# Patient Record
Sex: FEMALE | Race: WHITE | NOT HISPANIC OR LATINO | Employment: UNEMPLOYED | URBAN - METROPOLITAN AREA
[De-identification: names, ages, dates, MRNs, and addresses within clinical notes are randomized per-mention and may not be internally consistent; named-entity substitution may affect disease eponyms.]

---

## 2020-08-20 NOTE — NUR
TASK RN: DC EDUCATION PROVIDED, PT DEMONSTRATES UNDERSTANDING. PT AMBULATED 
STEADILY TO DC WITH RN AND SO

## 2020-08-20 NOTE — NUR
PATIENT C/O 8/10 RIGHT FACIAL PAIN, MEDICATED PER eMAR, VITAL SIGNS WITHIN 
NORMAL LIMITS, CALL LIGHT WITHIN REACH, SIDE RAILS UP X2, NO FURTHER NEEDS AT 
THIS TIME.

## 2020-08-20 NOTE — NUR
PATIENT WALKED BACK FROM TRIAGE WITH CHIEF COMPLAINT OF RIGHT EAR PAIN. PATIENT 
STARTED HAVING PAIN, AND REDNESS ON THE RIGHT UPPER CHEEK AND EAR SINCE 
YESTERDAY.  PER PATIENT SHE HAS HAD MASTOIDITIS IN THE PAST.  UPPER CHEEK BELOW 
EAR ON THE RIGHT SIDE IS RED AND TENDER TO TOUCH. PATIENT ACCOMPANIED BY 
SIGNIFICANT OTHER.

## 2020-08-28 ENCOUNTER — APPOINTMENT (OUTPATIENT)
Dept: RADIOLOGY | Facility: MEDICAL CENTER | Age: 45
End: 2020-08-28
Attending: EMERGENCY MEDICINE

## 2020-08-28 ENCOUNTER — HOSPITAL ENCOUNTER (OUTPATIENT)
Facility: MEDICAL CENTER | Age: 45
End: 2020-08-29
Attending: EMERGENCY MEDICINE | Admitting: HOSPITALIST

## 2020-08-28 ENCOUNTER — NURSE TRIAGE (OUTPATIENT)
Dept: HEALTH INFORMATION MANAGEMENT | Facility: OTHER | Age: 45
End: 2020-08-28

## 2020-08-28 DIAGNOSIS — L03.211 CELLULITIS, FACE: ICD-10-CM

## 2020-08-28 DIAGNOSIS — Z78.9 FAILURE OF OUTPATIENT TREATMENT: ICD-10-CM

## 2020-08-28 PROBLEM — H60.11 CELLULITIS OF AURICLE OF RIGHT EAR: Status: ACTIVE | Noted: 2020-08-28

## 2020-08-28 PROBLEM — M06.9 RHEUMATOID ARTHRITIS (HCC): Status: ACTIVE | Noted: 2020-08-28

## 2020-08-28 PROBLEM — K50.90 CROHN DISEASE (HCC): Status: ACTIVE | Noted: 2020-08-28

## 2020-08-28 LAB
ALBUMIN SERPL BCP-MCNC: 4.3 G/DL (ref 3.2–4.9)
ALBUMIN/GLOB SERPL: 1.3 G/DL
ALP SERPL-CCNC: 129 U/L (ref 30–99)
ALT SERPL-CCNC: 14 U/L (ref 2–50)
ANION GAP SERPL CALC-SCNC: 15 MMOL/L (ref 7–16)
AST SERPL-CCNC: 18 U/L (ref 12–45)
BASOPHILS # BLD AUTO: 0.4 % (ref 0–1.8)
BASOPHILS # BLD: 0.04 K/UL (ref 0–0.12)
BILIRUB SERPL-MCNC: 0.7 MG/DL (ref 0.1–1.5)
BUN SERPL-MCNC: 9 MG/DL (ref 8–22)
CALCIUM SERPL-MCNC: 9.5 MG/DL (ref 8.5–10.5)
CHLORIDE SERPL-SCNC: 102 MMOL/L (ref 96–112)
CO2 SERPL-SCNC: 22 MMOL/L (ref 20–33)
CREAT SERPL-MCNC: 0.66 MG/DL (ref 0.5–1.4)
EOSINOPHIL # BLD AUTO: 0.25 K/UL (ref 0–0.51)
EOSINOPHIL NFR BLD: 2.7 % (ref 0–6.9)
ERYTHROCYTE [DISTWIDTH] IN BLOOD BY AUTOMATED COUNT: 44.5 FL (ref 35.9–50)
GLOBULIN SER CALC-MCNC: 3.4 G/DL (ref 1.9–3.5)
GLUCOSE SERPL-MCNC: 95 MG/DL (ref 65–99)
HCG SERPL QL: NEGATIVE
HCT VFR BLD AUTO: 40 % (ref 37–47)
HGB BLD-MCNC: 13.1 G/DL (ref 12–16)
IMM GRANULOCYTES # BLD AUTO: 0.03 K/UL (ref 0–0.11)
IMM GRANULOCYTES NFR BLD AUTO: 0.3 % (ref 0–0.9)
LYMPHOCYTES # BLD AUTO: 2.92 K/UL (ref 1–4.8)
LYMPHOCYTES NFR BLD: 31.9 % (ref 22–41)
MCH RBC QN AUTO: 27.8 PG (ref 27–33)
MCHC RBC AUTO-ENTMCNC: 32.8 G/DL (ref 33.6–35)
MCV RBC AUTO: 84.7 FL (ref 81.4–97.8)
MONOCYTES # BLD AUTO: 0.41 K/UL (ref 0–0.85)
MONOCYTES NFR BLD AUTO: 4.5 % (ref 0–13.4)
NEUTROPHILS # BLD AUTO: 5.51 K/UL (ref 2–7.15)
NEUTROPHILS NFR BLD: 60.2 % (ref 44–72)
NRBC # BLD AUTO: 0 K/UL
NRBC BLD-RTO: 0 /100 WBC
PLATELET # BLD AUTO: 296 K/UL (ref 164–446)
PMV BLD AUTO: 8.9 FL (ref 9–12.9)
POTASSIUM SERPL-SCNC: 3.7 MMOL/L (ref 3.6–5.5)
PROT SERPL-MCNC: 7.7 G/DL (ref 6–8.2)
RBC # BLD AUTO: 4.72 M/UL (ref 4.2–5.4)
SODIUM SERPL-SCNC: 139 MMOL/L (ref 135–145)
WBC # BLD AUTO: 9.2 K/UL (ref 4.8–10.8)

## 2020-08-28 PROCEDURE — 700111 HCHG RX REV CODE 636 W/ 250 OVERRIDE (IP): Performed by: HOSPITALIST

## 2020-08-28 PROCEDURE — 80053 COMPREHEN METABOLIC PANEL: CPT

## 2020-08-28 PROCEDURE — C9803 HOPD COVID-19 SPEC COLLECT: HCPCS | Performed by: HOSPITALIST

## 2020-08-28 PROCEDURE — 96376 TX/PRO/DX INJ SAME DRUG ADON: CPT

## 2020-08-28 PROCEDURE — 87040 BLOOD CULTURE FOR BACTERIA: CPT

## 2020-08-28 PROCEDURE — 85025 COMPLETE CBC W/AUTO DIFF WBC: CPT

## 2020-08-28 PROCEDURE — 96365 THER/PROPH/DIAG IV INF INIT: CPT

## 2020-08-28 PROCEDURE — 70481 CT ORBIT/EAR/FOSSA W/DYE: CPT

## 2020-08-28 PROCEDURE — 700111 HCHG RX REV CODE 636 W/ 250 OVERRIDE (IP): Performed by: EMERGENCY MEDICINE

## 2020-08-28 PROCEDURE — 84703 CHORIONIC GONADOTROPIN ASSAY: CPT

## 2020-08-28 PROCEDURE — 700117 HCHG RX CONTRAST REV CODE 255: Performed by: EMERGENCY MEDICINE

## 2020-08-28 PROCEDURE — 99285 EMERGENCY DEPT VISIT HI MDM: CPT

## 2020-08-28 PROCEDURE — 700101 HCHG RX REV CODE 250: Performed by: HOSPITALIST

## 2020-08-28 PROCEDURE — G0378 HOSPITAL OBSERVATION PER HR: HCPCS

## 2020-08-28 PROCEDURE — 96375 TX/PRO/DX INJ NEW DRUG ADDON: CPT

## 2020-08-28 PROCEDURE — 700101 HCHG RX REV CODE 250: Performed by: EMERGENCY MEDICINE

## 2020-08-28 PROCEDURE — 99218 PR INITIAL OBSERVATION CARE,LEVL I: CPT | Performed by: HOSPITALIST

## 2020-08-28 RX ORDER — ONDANSETRON 2 MG/ML
4 INJECTION INTRAMUSCULAR; INTRAVENOUS ONCE
Status: COMPLETED | OUTPATIENT
Start: 2020-08-28 | End: 2020-08-28

## 2020-08-28 RX ORDER — MORPHINE SULFATE 4 MG/ML
4 INJECTION, SOLUTION INTRAMUSCULAR; INTRAVENOUS
Status: DISCONTINUED | OUTPATIENT
Start: 2020-08-28 | End: 2020-08-29 | Stop reason: HOSPADM

## 2020-08-28 RX ORDER — PROCHLORPERAZINE EDISYLATE 5 MG/ML
5-10 INJECTION INTRAMUSCULAR; INTRAVENOUS EVERY 4 HOURS PRN
Status: DISCONTINUED | OUTPATIENT
Start: 2020-08-28 | End: 2020-08-29 | Stop reason: HOSPADM

## 2020-08-28 RX ORDER — OXYCODONE HYDROCHLORIDE 10 MG/1
10 TABLET ORAL
Status: DISCONTINUED | OUTPATIENT
Start: 2020-08-28 | End: 2020-08-29 | Stop reason: HOSPADM

## 2020-08-28 RX ORDER — CLINDAMYCIN PHOSPHATE 300 MG/50ML
300 INJECTION, SOLUTION INTRAVENOUS ONCE
Status: COMPLETED | OUTPATIENT
Start: 2020-08-28 | End: 2020-08-28

## 2020-08-28 RX ORDER — OXYCODONE HYDROCHLORIDE 5 MG/1
5 TABLET ORAL
Status: DISCONTINUED | OUTPATIENT
Start: 2020-08-28 | End: 2020-08-29 | Stop reason: HOSPADM

## 2020-08-28 RX ORDER — ONDANSETRON 4 MG/1
4 TABLET, ORALLY DISINTEGRATING ORAL EVERY 4 HOURS PRN
Status: DISCONTINUED | OUTPATIENT
Start: 2020-08-28 | End: 2020-08-29 | Stop reason: HOSPADM

## 2020-08-28 RX ORDER — QUETIAPINE FUMARATE 50 MG/1
50 TABLET, FILM COATED ORAL
Status: ON HOLD | COMMUNITY
End: 2020-09-04 | Stop reason: SDUPTHER

## 2020-08-28 RX ORDER — CLINDAMYCIN HYDROCHLORIDE 150 MG/1
300 CAPSULE ORAL ONCE
Status: DISCONTINUED | OUTPATIENT
Start: 2020-08-28 | End: 2020-08-28

## 2020-08-28 RX ORDER — POLYETHYLENE GLYCOL 3350 17 G/17G
1 POWDER, FOR SOLUTION ORAL
Status: DISCONTINUED | OUTPATIENT
Start: 2020-08-28 | End: 2020-08-29 | Stop reason: HOSPADM

## 2020-08-28 RX ORDER — PROMETHAZINE HYDROCHLORIDE 25 MG/1
12.5-25 TABLET ORAL EVERY 4 HOURS PRN
Status: DISCONTINUED | OUTPATIENT
Start: 2020-08-28 | End: 2020-08-29 | Stop reason: HOSPADM

## 2020-08-28 RX ORDER — DOXYCYCLINE HYCLATE 100 MG
100 TABLET ORAL 2 TIMES DAILY
COMMUNITY
Start: 2020-08-23 | End: 2020-09-02

## 2020-08-28 RX ORDER — MORPHINE SULFATE 4 MG/ML
4 INJECTION, SOLUTION INTRAMUSCULAR; INTRAVENOUS ONCE
Status: COMPLETED | OUTPATIENT
Start: 2020-08-28 | End: 2020-08-28

## 2020-08-28 RX ORDER — ONDANSETRON 2 MG/ML
4 INJECTION INTRAMUSCULAR; INTRAVENOUS EVERY 4 HOURS PRN
Status: DISCONTINUED | OUTPATIENT
Start: 2020-08-28 | End: 2020-08-29 | Stop reason: HOSPADM

## 2020-08-28 RX ORDER — AMOXICILLIN 250 MG
2 CAPSULE ORAL 2 TIMES DAILY
Status: DISCONTINUED | OUTPATIENT
Start: 2020-08-28 | End: 2020-08-29 | Stop reason: HOSPADM

## 2020-08-28 RX ORDER — BISACODYL 10 MG
10 SUPPOSITORY, RECTAL RECTAL
Status: DISCONTINUED | OUTPATIENT
Start: 2020-08-28 | End: 2020-08-29 | Stop reason: HOSPADM

## 2020-08-28 RX ORDER — CLINDAMYCIN PHOSPHATE 600 MG/50ML
600 INJECTION, SOLUTION INTRAVENOUS EVERY 8 HOURS
Status: DISCONTINUED | OUTPATIENT
Start: 2020-08-28 | End: 2020-08-29 | Stop reason: HOSPADM

## 2020-08-28 RX ORDER — ACETAMINOPHEN 325 MG/1
650 TABLET ORAL EVERY 6 HOURS PRN
Status: DISCONTINUED | OUTPATIENT
Start: 2020-08-28 | End: 2020-08-29 | Stop reason: HOSPADM

## 2020-08-28 RX ORDER — PROMETHAZINE HYDROCHLORIDE 12.5 MG/1
12.5-25 SUPPOSITORY RECTAL EVERY 4 HOURS PRN
Status: DISCONTINUED | OUTPATIENT
Start: 2020-08-28 | End: 2020-08-29 | Stop reason: HOSPADM

## 2020-08-28 RX ORDER — HYDROMORPHONE HYDROCHLORIDE 1 MG/ML
1 INJECTION, SOLUTION INTRAMUSCULAR; INTRAVENOUS; SUBCUTANEOUS ONCE
Status: COMPLETED | OUTPATIENT
Start: 2020-08-28 | End: 2020-08-28

## 2020-08-28 RX ORDER — DIPHENHYDRAMINE HCL 25 MG
25 TABLET ORAL EVERY 6 HOURS PRN
COMMUNITY
End: 2020-09-02

## 2020-08-28 RX ADMIN — HYDROMORPHONE HYDROCHLORIDE 1 MG: 1 INJECTION, SOLUTION INTRAMUSCULAR; INTRAVENOUS; SUBCUTANEOUS at 19:38

## 2020-08-28 RX ADMIN — ONDANSETRON 4 MG: 2 INJECTION INTRAMUSCULAR; INTRAVENOUS at 19:02

## 2020-08-28 RX ADMIN — MORPHINE SULFATE 4 MG: 4 INJECTION INTRAVENOUS at 22:50

## 2020-08-28 RX ADMIN — MORPHINE SULFATE 4 MG: 4 INJECTION INTRAVENOUS at 19:02

## 2020-08-28 RX ADMIN — IOHEXOL 80 ML: 350 INJECTION, SOLUTION INTRAVENOUS at 19:00

## 2020-08-28 RX ADMIN — CLINDAMYCIN IN 5 PERCENT DEXTROSE 600 MG: 12 INJECTION, SOLUTION INTRAVENOUS at 22:50

## 2020-08-28 RX ADMIN — CLINDAMYCIN IN 5 PERCENT DEXTROSE 300 MG: 6 INJECTION, SOLUTION INTRAVENOUS at 20:45

## 2020-08-28 ASSESSMENT — ENCOUNTER SYMPTOMS
NAUSEA: 0
FEVER: 0
BLURRED VISION: 0
DIARRHEA: 0
DOUBLE VISION: 0
VOMITING: 0
SHORTNESS OF BREATH: 0
BACK PAIN: 0
DIZZINESS: 0
ABDOMINAL PAIN: 0
CHILLS: 0
COUGH: 0
HEADACHES: 0
LOSS OF CONSCIOUSNESS: 0
PALPITATIONS: 0
SORE THROAT: 0

## 2020-08-28 ASSESSMENT — LIFESTYLE VARIABLES: DO YOU DRINK ALCOHOL: NO

## 2020-08-28 NOTE — TELEPHONE ENCOUNTER
"Pt just moved here from [place] earlier this week. Has had pain behind right ear extending to along upper jaw. Today has redness and swelling.  called to see if he could accompany her into the ER. Checked with charge and answer was yes.    Reason for Disposition  • Fever and area of face is swollen    Additional Information  • Negative: Shock suspected (e.g., cold/pale/clammy skin, too weak to stand, low BP, rapid pulse)  • Negative: Similar pain previously and it was from 'heart attack'  • Negative: Similar pain previously and it was from 'angina' and not relieved by nitroglycerin  • Negative: Sounds like a life-threatening emergency to the triager  • Negative: Chest pain  • Negative: Sinus pain and congestion  • Negative: Headache or pain in upper forehead  • Negative: Toothache is the main symptom  • Negative: Followed a face injury  • Negative: Difficulty breathing or unusual sweating (e.g., sweating without exertion)  • Negative: Can't close the mouth fully (i.e., \"mouth is locked open\", patient will have difficulty talking)  • Negative: Fever and localized red rash    Answer Assessment - Initial Assessment Questions  1. ONSET: \"When did the pain start?\" (e.g., minutes, hours, days)      2 weeks ago, redness alongside right jaw and behind ear  2. ONSET: \"Does the pain come and go, or has it been constant since it started?\" (e.g., constant, intermittent, fleeting)      constant  3. SEVERITY: \"How bad is the pain?\"   (Scale 1-10; mild, moderate or severe)    - MILD (1-3): doesn't interfere with normal activities     - MODERATE (4-7): interferes with normal activities or awakens from sleep     - SEVERE (8-10): excruciating pain, unable to do any normal activities       Moderate to severe  4. LOCATION: \"Where does it hurt?\"       Right upper jaw to behind right ear  5. RASH: \"Is there any redness, rash, or swelling of the face?\"      Redness and swelling  6. FEVER: \"Do you have a fever?\" If so, ask: \"What is " "it, how was it measured, and when did it start?\"       unknown  7. OTHER SYMPTOMS: \"Do you have any other symptoms?\" (e.g., fever, toothache, nasal discharge, nasal congestion, clicking sensation in jaw joint)      none  8. PREGNANCY: \"Is there any chance you are pregnant?\" \"When was your last menstrual period?\"      No no periods in 3 years    Protocols used: FACE PAIN-A-OH      "

## 2020-08-28 NOTE — ED TRIAGE NOTES
Amb to triage w/ c/o R jaw pain, R facial redness & swelling x 3 days.  Pt reports a hx of MRSA to R face, requiring IV antibiotics. Pt just recently moved here.

## 2020-08-29 VITALS
BODY MASS INDEX: 22.49 KG/M2 | SYSTOLIC BLOOD PRESSURE: 95 MMHG | HEART RATE: 76 BPM | DIASTOLIC BLOOD PRESSURE: 59 MMHG | RESPIRATION RATE: 16 BRPM | OXYGEN SATURATION: 96 % | TEMPERATURE: 97.2 F | HEIGHT: 68 IN | WEIGHT: 148.37 LBS

## 2020-08-29 LAB
COVID ORDER STATUS COVID19: NORMAL
SARS-COV-2 RNA RESP QL NAA+PROBE: NOTDETECTED
SPECIMEN SOURCE: NORMAL

## 2020-08-29 PROCEDURE — G0378 HOSPITAL OBSERVATION PER HR: HCPCS

## 2020-08-29 PROCEDURE — U0003 INFECTIOUS AGENT DETECTION BY NUCLEIC ACID (DNA OR RNA); SEVERE ACUTE RESPIRATORY SYNDROME CORONAVIRUS 2 (SARS-COV-2) (CORONAVIRUS DISEASE [COVID-19]), AMPLIFIED PROBE TECHNIQUE, MAKING USE OF HIGH THROUGHPUT TECHNOLOGIES AS DESCRIBED BY CMS-2020-01-R: HCPCS

## 2020-08-29 NOTE — ED PROVIDER NOTES
"ED Provider Note    Scribed for Sarah Blake M.D. by Taylor Denson. 8/28/2020, 5:15 PM.    Primary care provider: No primary care provider on file.  Means of arrival: Walk-in  History obtained from: Patient  History limited by: None    CHIEF COMPLAINT  Chief Complaint   Patient presents with   • Jaw Pain   • Facial Swelling       HPI  Charley Dunn is a 45 y.o. female who presents to the Emergency Department for moderate right ear and jaw pain onset 10 days prior to arrival. The patient states that she her recurrent ear problems first began 12 years ago when she was diagnosed with an ear infection that never seemed to heal. 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 The patient reports that her swelling and pain began at first a week ago and she went was seen at [facility], she was prescribed doxycycline after her visit.  Despite completing her doxycycline yesterday she reports that her symptoms of erythema and ear pain have not resolved.  She reports that the erythema around her right ear has actually worsened.  The patient just recently moved to the area 2 weeks ago from [place] and does not yet have a PCP.  The patient denies any fever, cough, or known COVID exposure. The patient adds that she is allergic to all \"-cillins\" and will stop breathing if given any, she is also allergic to tordol, tramadol, and bactrim. She does not currently take any daily medications. The patient adds that she does have a history of Rheumatoid Arthritis.     REVIEW OF SYSTEMS  Review of Systems   Constitutional: Negative for fever.   HENT: Positive for ear pain (Right ear).         Positive for: right jaw pain   Respiratory: Negative for cough.    Gastrointestinal: Negative for abdominal pain, nausea and vomiting.   All other systems reviewed and are negative.        PAST MEDICAL HISTORY   has a past medical history of Bradycardia, Crohn's disease (HCC), MRSA (methicillin " "resistant Staphylococcus aureus), and RA (rheumatoid arthritis) (HCC).    SURGICAL HISTORY   has a past surgical history that includes appendectomy and cholecystectomy.    SOCIAL HISTORY  Social History     Tobacco Use   • Smoking status: Current Every Day Smoker     Packs/day: 0.50     Years: 32.00     Pack years: 16.00     Types: Cigarettes   Substance Use Topics   • Alcohol use: Yes     Comment: rare   • Drug use: Never      Social History     Substance and Sexual Activity   Drug Use Never       FAMILY HISTORY  History reviewed. No pertinent family history.    CURRENT MEDICATIONS  Home Medications     Reviewed by Buddy Torres R.N. (Registered Nurse) on 08/28/20 at 1659  Med List Status: Partial   Medication Last Dose Status   doxycycline (VIBRAMYCIN) 100 MG Tab 8/25 Active                ALLERGIES  Allergies   Allergen Reactions   • Bactrim [Sulfamethoxazole W-Trimethoprim]    • Pcn [Penicillins]    • Stadol [Butorphanol]    • Toradol    • Tramadol        PHYSICAL EXAM  VITAL SIGNS: /84   Pulse (!) 101   Temp 36.2 °C (97.2 °F) (Temporal)   Resp 16   Ht 1.727 m (5' 8\")   Wt 67.3 kg (148 lb 5.9 oz)   SpO2 97%   BMI 22.56 kg/m²   Vitals reviewed by myself.  Physical Exam  Nursing note and vitals reviewed.  Constitutional: Well-developed and well-nourished. Appears uncomfortable  HENT: Right ear notable for diffuse erythema anteriorly and posteriorly and to the right ear itself.  Difficult to assess ear canal and tympanic membrane given patient significant pain upon mild palpation of the right ear or surrounding tissue.  Left tympanic membrane is nonerythematous  Eyes: extra-ocular movements intact  Cardiovascular: Tachycardic rate and regular rhythm. No murmur heard.  Pulmonary/Chest: Breath sounds normal. No wheezes or rales.   Abdominal: Soft and non-tender. No distention.    Musculoskeletal: Extremities exhibit normal range of motion without edema or tenderness.   Neurological: Awake and " "alert  Skin: Skin is warm and dry. No rash.         DIAGNOSTIC STUDIES /  LABS  Labs Reviewed   CBC WITH DIFFERENTIAL - Abnormal; Notable for the following components:       Result Value    MCHC 32.8 (*)     MPV 8.9 (*)     All other components within normal limits   COMP METABOLIC PANEL - Abnormal; Notable for the following components:    Alkaline Phosphatase 129 (*)     All other components within normal limits   BLOOD CULTURE    Narrative:     Per Hospital Policy: Only change Specimen Src: to \"Line\" if  specified by physician order.   BLOOD CULTURE    Narrative:     Per Hospital Policy: Only change Specimen Src: to \"Line\" if  specified by physician order.   HCG QUAL SERUM   ESTIMATED GFR       All labs reviewed by me.        RADIOLOGY  CT-POST-FOSSA-EAR WITH   Final Result      1.  Soft tissue thickening or more likely cerumen debris in the right and left external auditory canals which appears fairly symmetric.   2.  Unremarkable right mastoid and middle ear cavity with no evidence of active or chronic inflammatory middle ear mastoid disease.   3.  Scattered moderate-sized left mastoid air cells with intervening sclerotic mastoid bone consistent with chronic/active inflammatory mastoid disease. The left middle ear cavity and mastoid antrum are clear.        The radiologist's interpretation of all radiological studies have been reviewed by me.      REASSESSMENT  5:15 PM - Patient seen and examined at bedside. Discussed plan of care, including labs and radiology. Patient agrees to the plan of care.       COURSE & MEDICAL DECISION MAKING  Nursing notes, VS, PMSFHx reviewed in chart.    Patient is a 45-year-old female who comes in for evaluation of right ear pain.  Differential diagnosis includes cellulitis, mastoiditis, otitis media.  Diagnostic work-up includes labs and CT of the posterior fossa to assess for mastoiditis.    Patient's initial vitals notable for tachycardia, she appears uncomfortable on exam.  " Patient is treated with morphine and Zofran with minimal improvement in her pain and is further treated with Dilaudid.  She has some improvement in her symptoms after Dilaudid.  Labs returned and are unremarkable, white count is within normal limits.  CT returns and demonstrates no evidence of mastoiditis on the right side, she does have sclerotic changes of the left ear, as she is not having acute pain there I feel this is a chronic process from her prior infections.  Given her ongoing pain and worsening erythema surrounding the ear despite doxycycline treatment I will hospitalize her for IV antibiotics.  I have started her on clindamycin.  Patient is amenable to this plan.  Discussed the case with Dr. Lesley mckinney has accepted patient for hospitalization.  Patient is in guarded condition.      FINAL IMPRESSION  1. Cellulitis, face    2. Failure of outpatient treatment          Taylor JENNINGS (Deborah), am scribing for, and in the presence of, Sarah Blake M.D..    Electronically signed by: Taylor Denson (Deborah), 8/28/2020    ISarah M.D. personally performed the services described in this documentation, as scribed by Taylor Denson in my presence, and it is both accurate and complete. C.     The note accurately reflects work and decisions made by me.  Sarah Blake M.D.  8/28/2020  7:43 PM

## 2020-08-29 NOTE — H&P
"Hospital Medicine History & Physical Note    Date of Service  8/28/2020    Primary Care Physician  No primary care provider on file.    Consultants      Code Status  Full Code    Chief Complaint  Chief Complaint   Patient presents with   • Jaw Pain   • Facial Swelling       History of Presenting Illness  45 y.o. female who presented 8/28/2020 with right ear pain and redness.  Patient is recently moved [place] the [facility] [place]  She reports that she woke with redness and pain about the right ear and mastoid area about 1 week ago.  She was seen at [facility] where she was started on doxycycline.  She is taking this for 7 days however has continued to get worse during this time..  It feels hot to her, and the pain extends down into her jaw into the submandibular region as well.  She is not having difficulty eating or drinking, she does not report any shortness of breath.  There is been no fever or chills.  She does have a previous history of infections on the right side, as a result of this she is deaf and is here.  She had a CT done in the ED, this was negative for evidence of mastoiditis.  Clinically she has a cellulitis and she is being admitted for treatment of this.  Patient carries a previous history of Crohn's disease and rheumatoid arthritis.  She states her Crohn's disease is \"in remission\", and she is not on any active medications for her rheumatoid arthritis.    Review of Systems  Review of Systems   Constitutional: Negative for chills and fever.   HENT: Positive for ear pain and hearing loss. Negative for nosebleeds and sore throat.    Eyes: Negative for blurred vision and double vision.   Respiratory: Negative for cough and shortness of breath.    Cardiovascular: Negative for chest pain, palpitations and leg swelling.   Gastrointestinal: Negative for abdominal pain, diarrhea, nausea and vomiting.   Genitourinary: Negative for dysuria and urgency.   Musculoskeletal: Negative for back " pain.   Skin: Positive for rash.   Neurological: Negative for dizziness, loss of consciousness and headaches.       Past Medical History   has a past medical history of Bradycardia, Crohn's disease ([facility]), MRSA (methicillin resistant Staphylococcus aureus), and RA (rheumatoid arthritis) ([facility]).    Surgical History   has a past surgical history that includes appendectomy and cholecystectomy.     Family History  family history is not on file.     Social History   reports that she has been smoking cigarettes. She has a 16.00 pack-year smoking history. She does not have any smokeless tobacco history on file. She reports current alcohol use. She reports that she does not use drugs.    Allergies  Allergies   Allergen Reactions   • Bactrim [Sulfamethoxazole W-Trimethoprim]    • Pcn [Penicillins]    • Stadol [Butorphanol]    • Toradol    • Tramadol        Medications  Prior to Admission Medications   Prescriptions Last Dose Informant Patient Reported? Taking?   doxycycline (VIBRAMYCIN) 100 MG Tab 8/25 at completed  Yes Yes   Sig: Take 100 mg by mouth 2 times a day.      Facility-Administered Medications: None       Physical Exam  Temp:  [36.2 °C (97.2 °F)] 36.2 °C (97.2 °F)  Pulse:  [101] 101  Resp:  [16] 16  BP: (124)/(84) 124/84  SpO2:  [97 %] 97 %    Physical Exam  Vitals signs reviewed.   Constitutional:       General: She is not in acute distress.     Appearance: She is well-developed. She is not diaphoretic.   HENT:      Head: Normocephalic and atraumatic.      Comments: There is erythema surrounding the right ear, extending down into the angle of the jaw and forward over the TMJ.  There is tenderness behind the ear, as well as in the pinna.  Ear canal is edematous however clear.  The tympanic membrane is cloudy however not bulging.     Left Ear: Tympanic membrane, ear canal and external ear normal.   Neck:      Vascular: No JVD.   Cardiovascular:      Rate and Rhythm: Normal rate and regular rhythm.   Pulmonary:       Effort: Pulmonary effort is normal. No respiratory distress.      Breath sounds: No stridor. No wheezing or rales.   Abdominal:      Palpations: Abdomen is soft.      Tenderness: There is no abdominal tenderness. There is no guarding or rebound.   Skin:     General: Skin is warm and dry.      Findings: No rash.   Neurological:      Mental Status: She is oriented to person, place, and time.   Psychiatric:         Thought Content: Thought content normal.         Laboratory:  Recent Labs     08/28/20  1728   WBC 9.2   RBC 4.72   HEMOGLOBIN 13.1   HEMATOCRIT 40.0   MCV 84.7   MCH 27.8   MCHC 32.8*   RDW 44.5   PLATELETCT 296   MPV 8.9*     Recent Labs     08/28/20  1728   SODIUM 139   POTASSIUM 3.7   CHLORIDE 102   CO2 22   GLUCOSE 95   BUN 9   CREATININE 0.66   CALCIUM 9.5     Recent Labs     08/28/20  1728   ALTSGPT 14   ASTSGOT 18   ALKPHOSPHAT 129*   TBILIRUBIN 0.7   GLUCOSE 95         No results for input(s): NTPROBNP in the last 72 hours.      No results for input(s): TROPONINT in the last 72 hours.    Imaging:  CT-POST-FOSSA-EAR WITH   Final Result      1.  Soft tissue thickening or more likely cerumen debris in the right and left external auditory canals which appears fairly symmetric.   2.  Unremarkable right mastoid and middle ear cavity with no evidence of active or chronic inflammatory middle ear mastoid disease.   3.  Scattered moderate-sized left mastoid air cells with intervening sclerotic mastoid bone consistent with chronic/active inflammatory mastoid disease. The left middle ear cavity and mastoid antrum are clear.            Assessment/Plan:  I anticipate this patient is appropriate for observation status at this time.    Crohn disease ([facility])  Assessment & Plan  Patient states that her Crohn's disease is in remission and she has not been on any medications for some time    Rheumatoid arthritis ([facility])  Assessment & Plan  The patient is not currently on any active therapy    Cellulitis of auricle of right  ear  Assessment & Plan  Patient has failed outpatient doxycycline  Has a penicillin allergy  We will admit and start on IV clindamycin  CT does not show any deep tissue involvement  PRN support

## 2020-08-29 NOTE — PROGRESS NOTES
Charley Dunn patient has chosen to leave the hospital against medical advice. The attending physician has not discharged the patient. Patient is not a risk to himself or others. I have discussed with the patient the following:  Physician has not determined patient is ready for discharge, Risks and consequences of leaving the hospital too soon and Benefit of continued hospitalization.       Attending physician Jeremiah PALACIOS, has been notified. IV removed.

## 2020-08-29 NOTE — ASSESSMENT & PLAN NOTE
Patient states that her Crohn's disease is in remission and she has not been on any medications for some time

## 2020-08-29 NOTE — ED NOTES
Pharmacy Medication Reconciliation    Med rec updated and complete per pt at bedside  Allergies have been verified and updated  Pt home pharmacy:[facility]      Pt reports that she finished a 3 day course of Vibramycin that was started on 08/23/2020

## 2020-08-29 NOTE — ASSESSMENT & PLAN NOTE
Patient has failed outpatient doxycycline  Has a penicillin allergy  We will admit and start on IV clindamycin  CT does not show any deep tissue involvement  PRN support

## 2020-08-29 NOTE — ED TRIAGE NOTES
"Ambulatory to room. Reports recurrent history of right \"ear problems\" and facial swelling. States she was scheduled for surgery but insurance was denied. This episode she reports three days of swelling and pain. Denies intraoral or airway involvement. Chart up for ERP.   "

## 2020-09-02 ENCOUNTER — HOSPITAL ENCOUNTER (OUTPATIENT)
Facility: MEDICAL CENTER | Age: 45
End: 2020-09-04
Attending: EMERGENCY MEDICINE | Admitting: INTERNAL MEDICINE
Payer: OTHER GOVERNMENT

## 2020-09-02 DIAGNOSIS — L03.211 FACIAL CELLULITIS: ICD-10-CM

## 2020-09-02 DIAGNOSIS — H60.11 CELLULITIS OF AURICLE OF RIGHT EAR: ICD-10-CM

## 2020-09-02 PROBLEM — E87.6 HYPOKALEMIA: Status: ACTIVE | Noted: 2020-09-02

## 2020-09-02 LAB
ANION GAP SERPL CALC-SCNC: 12 MMOL/L (ref 7–16)
BACTERIA BLD CULT: NORMAL
BACTERIA BLD CULT: NORMAL
BASOPHILS # BLD AUTO: 0.4 % (ref 0–1.8)
BASOPHILS # BLD: 0.03 K/UL (ref 0–0.12)
BUN SERPL-MCNC: 8 MG/DL (ref 8–22)
CALCIUM SERPL-MCNC: 9.4 MG/DL (ref 8.5–10.5)
CHLORIDE SERPL-SCNC: 105 MMOL/L (ref 96–112)
CO2 SERPL-SCNC: 24 MMOL/L (ref 20–33)
CREAT SERPL-MCNC: 0.69 MG/DL (ref 0.5–1.4)
EOSINOPHIL # BLD AUTO: 0.21 K/UL (ref 0–0.51)
EOSINOPHIL NFR BLD: 2.9 % (ref 0–6.9)
ERYTHROCYTE [DISTWIDTH] IN BLOOD BY AUTOMATED COUNT: 43.7 FL (ref 35.9–50)
GLUCOSE SERPL-MCNC: 89 MG/DL (ref 65–99)
HCT VFR BLD AUTO: 38.8 % (ref 37–47)
HGB BLD-MCNC: 13 G/DL (ref 12–16)
IMM GRANULOCYTES # BLD AUTO: 0.01 K/UL (ref 0–0.11)
IMM GRANULOCYTES NFR BLD AUTO: 0.1 % (ref 0–0.9)
LYMPHOCYTES # BLD AUTO: 2.55 K/UL (ref 1–4.8)
LYMPHOCYTES NFR BLD: 34.7 % (ref 22–41)
MAGNESIUM SERPL-MCNC: 2.3 MG/DL (ref 1.5–2.5)
MCH RBC QN AUTO: 28 PG (ref 27–33)
MCHC RBC AUTO-ENTMCNC: 33.5 G/DL (ref 33.6–35)
MCV RBC AUTO: 83.4 FL (ref 81.4–97.8)
MONOCYTES # BLD AUTO: 0.37 K/UL (ref 0–0.85)
MONOCYTES NFR BLD AUTO: 5 % (ref 0–13.4)
NEUTROPHILS # BLD AUTO: 4.17 K/UL (ref 2–7.15)
NEUTROPHILS NFR BLD: 56.9 % (ref 44–72)
NRBC # BLD AUTO: 0 K/UL
NRBC BLD-RTO: 0 /100 WBC
PLATELET # BLD AUTO: 288 K/UL (ref 164–446)
PMV BLD AUTO: 9 FL (ref 9–12.9)
POTASSIUM SERPL-SCNC: 3.5 MMOL/L (ref 3.6–5.5)
RBC # BLD AUTO: 4.65 M/UL (ref 4.2–5.4)
SIGNIFICANT IND 70042: NORMAL
SIGNIFICANT IND 70042: NORMAL
SITE SITE: NORMAL
SITE SITE: NORMAL
SODIUM SERPL-SCNC: 141 MMOL/L (ref 135–145)
SOURCE SOURCE: NORMAL
SOURCE SOURCE: NORMAL
WBC # BLD AUTO: 7.3 K/UL (ref 4.8–10.8)

## 2020-09-02 PROCEDURE — 700111 HCHG RX REV CODE 636 W/ 250 OVERRIDE (IP): Performed by: EMERGENCY MEDICINE

## 2020-09-02 PROCEDURE — 700101 HCHG RX REV CODE 250: Performed by: EMERGENCY MEDICINE

## 2020-09-02 PROCEDURE — 700105 HCHG RX REV CODE 258: Performed by: INTERNAL MEDICINE

## 2020-09-02 PROCEDURE — 80048 BASIC METABOLIC PNL TOTAL CA: CPT

## 2020-09-02 PROCEDURE — 700102 HCHG RX REV CODE 250 W/ 637 OVERRIDE(OP): Performed by: NURSE PRACTITIONER

## 2020-09-02 PROCEDURE — 700111 HCHG RX REV CODE 636 W/ 250 OVERRIDE (IP): Performed by: INTERNAL MEDICINE

## 2020-09-02 PROCEDURE — G0378 HOSPITAL OBSERVATION PER HR: HCPCS

## 2020-09-02 PROCEDURE — 83735 ASSAY OF MAGNESIUM: CPT

## 2020-09-02 PROCEDURE — 99285 EMERGENCY DEPT VISIT HI MDM: CPT

## 2020-09-02 PROCEDURE — 85025 COMPLETE CBC W/AUTO DIFF WBC: CPT

## 2020-09-02 PROCEDURE — 96375 TX/PRO/DX INJ NEW DRUG ADDON: CPT

## 2020-09-02 PROCEDURE — 96365 THER/PROPH/DIAG IV INF INIT: CPT

## 2020-09-02 PROCEDURE — 700111 HCHG RX REV CODE 636 W/ 250 OVERRIDE (IP): Performed by: NURSE PRACTITIONER

## 2020-09-02 PROCEDURE — A9270 NON-COVERED ITEM OR SERVICE: HCPCS | Performed by: NURSE PRACTITIONER

## 2020-09-02 PROCEDURE — 700102 HCHG RX REV CODE 250 W/ 637 OVERRIDE(OP): Performed by: INTERNAL MEDICINE

## 2020-09-02 PROCEDURE — A9270 NON-COVERED ITEM OR SERVICE: HCPCS | Performed by: INTERNAL MEDICINE

## 2020-09-02 PROCEDURE — 99220 PR INITIAL OBSERVATION CARE,LEVL III: CPT | Performed by: INTERNAL MEDICINE

## 2020-09-02 PROCEDURE — 96376 TX/PRO/DX INJ SAME DRUG ADON: CPT

## 2020-09-02 RX ORDER — NICOTINE 21 MG/24HR
21 PATCH, TRANSDERMAL 24 HOURS TRANSDERMAL
Status: DISCONTINUED | OUTPATIENT
Start: 2020-09-03 | End: 2020-09-04 | Stop reason: HOSPADM

## 2020-09-02 RX ORDER — POTASSIUM CHLORIDE 20 MEQ/1
40 TABLET, EXTENDED RELEASE ORAL ONCE
Status: COMPLETED | OUTPATIENT
Start: 2020-09-02 | End: 2020-09-02

## 2020-09-02 RX ORDER — BISACODYL 10 MG
10 SUPPOSITORY, RECTAL RECTAL
Status: DISCONTINUED | OUTPATIENT
Start: 2020-09-02 | End: 2020-09-04 | Stop reason: HOSPADM

## 2020-09-02 RX ORDER — ONDANSETRON 2 MG/ML
4 INJECTION INTRAMUSCULAR; INTRAVENOUS EVERY 6 HOURS PRN
Status: DISCONTINUED | OUTPATIENT
Start: 2020-09-02 | End: 2020-09-04 | Stop reason: HOSPADM

## 2020-09-02 RX ORDER — HYDROCODONE BITARTRATE AND ACETAMINOPHEN 5; 325 MG/1; MG/1
1-2 TABLET ORAL EVERY 6 HOURS PRN
Status: DISCONTINUED | OUTPATIENT
Start: 2020-09-02 | End: 2020-09-04 | Stop reason: HOSPADM

## 2020-09-02 RX ORDER — CLINDAMYCIN PHOSPHATE 600 MG/50ML
600 INJECTION, SOLUTION INTRAVENOUS ONCE
Status: COMPLETED | OUTPATIENT
Start: 2020-09-02 | End: 2020-09-02

## 2020-09-02 RX ORDER — SODIUM CHLORIDE 9 MG/ML
INJECTION, SOLUTION INTRAVENOUS CONTINUOUS
Status: DISCONTINUED | OUTPATIENT
Start: 2020-09-02 | End: 2020-09-03

## 2020-09-02 RX ORDER — POLYETHYLENE GLYCOL 3350 17 G/17G
1 POWDER, FOR SOLUTION ORAL
Status: DISCONTINUED | OUTPATIENT
Start: 2020-09-02 | End: 2020-09-04 | Stop reason: HOSPADM

## 2020-09-02 RX ORDER — HYDROMORPHONE HYDROCHLORIDE 1 MG/ML
0.5 INJECTION, SOLUTION INTRAMUSCULAR; INTRAVENOUS; SUBCUTANEOUS ONCE
Status: COMPLETED | OUTPATIENT
Start: 2020-09-02 | End: 2020-09-02

## 2020-09-02 RX ORDER — QUETIAPINE FUMARATE 25 MG/1
50 TABLET, FILM COATED ORAL
Status: DISCONTINUED | OUTPATIENT
Start: 2020-09-02 | End: 2020-09-04 | Stop reason: HOSPADM

## 2020-09-02 RX ORDER — ACETAMINOPHEN 325 MG/1
650 TABLET ORAL EVERY 6 HOURS PRN
Status: DISCONTINUED | OUTPATIENT
Start: 2020-09-02 | End: 2020-09-04 | Stop reason: HOSPADM

## 2020-09-02 RX ORDER — AMOXICILLIN 250 MG
2 CAPSULE ORAL 2 TIMES DAILY
Status: DISCONTINUED | OUTPATIENT
Start: 2020-09-02 | End: 2020-09-04 | Stop reason: HOSPADM

## 2020-09-02 RX ORDER — CLINDAMYCIN PHOSPHATE 600 MG/50ML
600 INJECTION, SOLUTION INTRAVENOUS EVERY 8 HOURS
Status: DISCONTINUED | OUTPATIENT
Start: 2020-09-03 | End: 2020-09-03

## 2020-09-02 RX ORDER — MORPHINE SULFATE 4 MG/ML
2 INJECTION, SOLUTION INTRAMUSCULAR; INTRAVENOUS EVERY 4 HOURS PRN
Status: DISCONTINUED | OUTPATIENT
Start: 2020-09-02 | End: 2020-09-04

## 2020-09-02 RX ADMIN — SODIUM CHLORIDE: 9 INJECTION, SOLUTION INTRAVENOUS at 22:48

## 2020-09-02 RX ADMIN — NICOTINE POLACRILEX 2 MG: 2 GUM, CHEWING BUCCAL at 23:29

## 2020-09-02 RX ADMIN — QUETIAPINE FUMARATE 50 MG: 25 TABLET ORAL at 22:46

## 2020-09-02 RX ADMIN — POTASSIUM CHLORIDE 40 MEQ: 1500 TABLET, EXTENDED RELEASE ORAL at 23:29

## 2020-09-02 RX ADMIN — ONDANSETRON 4 MG: 2 INJECTION INTRAMUSCULAR; INTRAVENOUS at 23:29

## 2020-09-02 RX ADMIN — MORPHINE SULFATE 2 MG: 4 INJECTION INTRAVENOUS at 22:56

## 2020-09-02 RX ADMIN — HYDROMORPHONE HYDROCHLORIDE 0.5 MG: 1 INJECTION, SOLUTION INTRAMUSCULAR; INTRAVENOUS; SUBCUTANEOUS at 17:30

## 2020-09-02 RX ADMIN — CLINDAMYCIN IN 5 PERCENT DEXTROSE 600 MG: 12 INJECTION, SOLUTION INTRAVENOUS at 17:31

## 2020-09-02 RX ADMIN — HYDROMORPHONE HYDROCHLORIDE 0.5 MG: 1 INJECTION, SOLUTION INTRAMUSCULAR; INTRAVENOUS; SUBCUTANEOUS at 20:13

## 2020-09-02 SDOH — ECONOMIC STABILITY: TRANSPORTATION INSECURITY
IN THE PAST 12 MONTHS, HAS LACK OF TRANSPORTATION KEPT YOU FROM MEETINGS, WORK, OR FROM GETTING THINGS NEEDED FOR DAILY LIVING?: NO

## 2020-09-02 SDOH — ECONOMIC STABILITY: FOOD INSECURITY: WITHIN THE PAST 12 MONTHS, YOU WORRIED THAT YOUR FOOD WOULD RUN OUT BEFORE YOU GOT MONEY TO BUY MORE.: PATIENT DECLINED

## 2020-09-02 SDOH — ECONOMIC STABILITY: TRANSPORTATION INSECURITY
IN THE PAST 12 MONTHS, HAS THE LACK OF TRANSPORTATION KEPT YOU FROM MEDICAL APPOINTMENTS OR FROM GETTING MEDICATIONS?: NO

## 2020-09-02 SDOH — ECONOMIC STABILITY: FOOD INSECURITY: WITHIN THE PAST 12 MONTHS, THE FOOD YOU BOUGHT JUST DIDN'T LAST AND YOU DIDN'T HAVE MONEY TO GET MORE.: PATIENT DECLINED

## 2020-09-02 ASSESSMENT — ENCOUNTER SYMPTOMS
EYE PAIN: 0
BLURRED VISION: 0
HALLUCINATIONS: 0
VOMITING: 0
PALPITATIONS: 0
FOCAL WEAKNESS: 0
NECK PAIN: 0
DOUBLE VISION: 0
SHORTNESS OF BREATH: 0
PHOTOPHOBIA: 0
ORTHOPNEA: 0
DIZZINESS: 0
CHILLS: 0
NAUSEA: 0
HEADACHES: 0
COUGH: 0
ABDOMINAL PAIN: 0
CONSTIPATION: 0
WEIGHT LOSS: 0
TREMORS: 0
TINGLING: 0
FEVER: 0
SENSORY CHANGE: 0
MYALGIAS: 1
DIARRHEA: 0
BACK PAIN: 0
SPEECH CHANGE: 0
SPUTUM PRODUCTION: 0

## 2020-09-02 ASSESSMENT — PAIN DESCRIPTION - PAIN TYPE: TYPE: ACUTE PAIN

## 2020-09-02 ASSESSMENT — COGNITIVE AND FUNCTIONAL STATUS - GENERAL
SUGGESTED CMS G CODE MODIFIER MOBILITY: CH
DAILY ACTIVITIY SCORE: 24
SUGGESTED CMS G CODE MODIFIER DAILY ACTIVITY: CH
MOBILITY SCORE: 24

## 2020-09-02 ASSESSMENT — LIFESTYLE VARIABLES
EVER FELT BAD OR GUILTY ABOUT YOUR DRINKING: NO
CONSUMPTION TOTAL: INCOMPLETE
TOTAL SCORE: 0
HAVE YOU EVER FELT YOU SHOULD CUT DOWN ON YOUR DRINKING: NO
DO YOU DRINK ALCOHOL: NO
EVER HAD A DRINK FIRST THING IN THE MORNING TO STEADY YOUR NERVES TO GET RID OF A HANGOVER: NO
HAVE PEOPLE ANNOYED YOU BY CRITICIZING YOUR DRINKING: NO
SUBSTANCE_ABUSE: 0
TOTAL SCORE: 0
TOTAL SCORE: 0
DOES PATIENT WANT TO STOP DRINKING: NO
ALCOHOL_USE: NO

## 2020-09-02 ASSESSMENT — FIBROSIS 4 INDEX
FIB4 SCORE: 0.76
FIB4 SCORE: 0.79

## 2020-09-02 ASSESSMENT — PATIENT HEALTH QUESTIONNAIRE - PHQ9
SUM OF ALL RESPONSES TO PHQ9 QUESTIONS 1 AND 2: 0
2. FEELING DOWN, DEPRESSED, IRRITABLE, OR HOPELESS: NOT AT ALL
1. LITTLE INTEREST OR PLEASURE IN DOING THINGS: NOT AT ALL

## 2020-09-02 NOTE — ED TRIAGE NOTES
Patient to ED with complaints of right ear pain, facial pain, eye lid swelling. She reports she developed this pain 2 weeks ago. She took a course of doxycycline, then presented to this ED 8/28. She was admitted to hospital, but because she was suffering severe anxiety she felt she needed to leave. She did not complete her treatment and is still feeling pain and HA. Denies fevers.     Pt educated on ED process and asked to wait in lobby. Patient educated on importance of alerting staff to new or worsening symptoms or concerns.

## 2020-09-02 NOTE — ED PROVIDER NOTES
ED Provider Note    Scribed for Ami Thrasher M.D. by Kortney Ruano. 9/2/2020  4:41 PM    Primary care provider: No primary care provider on file.  Means of arrival: Walk-In  History obtained from: Patient  History limited by: None    CHIEF COMPLAINT  Chief Complaint   Patient presents with   • Ear Pain   • Facial Pain   • Eye Pain       HPI  Charley Dunn is a 45 y.o. female with a history of arthritis who presents to the Emergency Department for right sided facial pain onset 2 weeks ago. The patient states she was at [facility] on the 28th after receiving a full course of doxycycline prescribed by [place], but reports that the treatment didn't alleviate her symptoms. She adds that she had severe anxiety at the time and felt the need to leave the hospital AMA without prescriptions.  She describes pain to the right side of her head and now notices a lump on her right upper eyelid.  No alleviating factors were noted, and pain is exacerbated with palpation. Patient has associated headache, right ear pain, right eye pain, and right eyelid edema, but denies fever or diarrhea. She does not take any daily medications.     The patient's previous records were reviewed which showed that the patient had radiology done which showed chronic mastoiditis on the left side of her face which is the opposite side of her symptoms.. The patient also had erythema to the right middle ear cavity and a cloudy tympanic membrane on the right ear.     REVIEW OF SYSTEMS  HEENT:  Positive for right ear pain and right sided facial pain.  No neck stiffness voice changes drooling or trismus no vision changes  EYES: Positive for right eye pain and right eyelid edema.   GI: no diarrhea  Endocrine: no fevers    See history of present illness. All other systems are negative. C.    PAST MEDICAL HISTORY   has a past medical history of Anxiety, Bradycardia, Crohn's disease (HCC), MRSA (methicillin resistant Staphylococcus aureus), and RA  "(rheumatoid arthritis) ([facility]).    SURGICAL HISTORY   has a past surgical history that includes other abdominal surgery and other.    SOCIAL HISTORY  Social History     Tobacco Use   • Smoking status: Current Every Day Smoker     Packs/day: 0.50     Years: 32.00     Pack years: 16.00     Types: Cigarettes   Substance Use Topics   • Alcohol use: Not Currently     Comment: rare   • Drug use: Never      Social History     Substance and Sexual Activity   Drug Use Never       FAMILY HISTORY  No family history on file.    CURRENT MEDICATIONS  Home Medications     Reviewed by Nancy Hidalgo on 09/02/20 at 1908  Med List Status: Complete   Medication Last Dose Status   quetiapine (SEROQUEL) 50 MG tablet 9/1/2020 Active                ALLERGIES  Allergies   Allergen Reactions   • Pcn [Penicillins] Anaphylaxis   • Toradol Hives     Migraine     • Tramadol Hives     migraine     • Bactrim [Sulfamethoxazole W-Trimethoprim] Itching     Face itching     • Stadol [Butorphanol] Unspecified     Pt reports it makes her feel like she can't wake up       PHYSICAL EXAM  VITAL SIGNS: /92   Pulse (!) 109   Temp 37 °C (98.6 °F) (Temporal)   Resp 20   Ht 1.727 m (5' 8\")   Wt 67.1 kg (147 lb 14.9 oz)   SpO2 98%   BMI 22.49 kg/m²     Constitutional: Well developed, Well nourished, No acute distress, Non-toxic appearance.   HEENT: No elevation or pressure pushing ear forward where mastoid is, Right TM is cloudy with no bulgding or erythema, Normocephalic, Atraumatic,  external ears normal, pharynx pink,  Mucous  Membranes moist, No rhinorrhea or mucosal edema  Eyes: Stye on right upper eyelid, PERRL, EOMI, Conjunctiva normal, No discharge.   Neck: Normal range of motion, No tenderness, Supple, No stridor.   Lymphatic: No lymphadenopathy    Cardiovascular: Regular Rate and Rhythm, No murmurs,  rubs, or gallops.   Thorax & Lungs: Lungs clear to auscultation bilaterally, No respiratory distress, No wheezes, rhales or rhonchi, No " chest wall tenderness.   Abdomen: Bowel sounds normal, Soft, non tender, non distended,  No pulsatile masses., no rebound guarding or peritoneal signs.   Skin: Right side of face swollen, tender, and mildly erythematous, Warm, Dry, No erythema, No rash,   Back:  No CVA tenderness,  No spinal tenderness, bony crepitance, step offs, or instability.   Neurologic: Normal speech, Alert & oriented x 3, Normal motor function, Normal sensory function, No focal deficits noted. Normal reflexes. Normal Cranial Nerves.  Extremities: Equal, intact distal pulses, No cyanosis, clubbing or edema,  No tenderness.   Musculoskeletal: Good range of motion in all major joints. No tenderness to palpation or major deformities noted.     DIAGNOSTIC STUDIES / PROCEDURES    LABS  Results for orders placed or performed during the hospital encounter of 09/02/20   CBC WITH DIFFERENTIAL   Result Value Ref Range    WBC 7.3 4.8 - 10.8 K/uL    RBC 4.65 4.20 - 5.40 M/uL    Hemoglobin 13.0 12.0 - 16.0 g/dL    Hematocrit 38.8 37.0 - 47.0 %    MCV 83.4 81.4 - 97.8 fL    MCH 28.0 27.0 - 33.0 pg    MCHC 33.5 (L) 33.6 - 35.0 g/dL    RDW 43.7 35.9 - 50.0 fL    Platelet Count 288 164 - 446 K/uL    MPV 9.0 9.0 - 12.9 fL    Neutrophils-Polys 56.90 44.00 - 72.00 %    Lymphocytes 34.70 22.00 - 41.00 %    Monocytes 5.00 0.00 - 13.40 %    Eosinophils 2.90 0.00 - 6.90 %    Basophils 0.40 0.00 - 1.80 %    Immature Granulocytes 0.10 0.00 - 0.90 %    Nucleated RBC 0.00 /100 WBC    Neutrophils (Absolute) 4.17 2.00 - 7.15 K/uL    Lymphs (Absolute) 2.55 1.00 - 4.80 K/uL    Monos (Absolute) 0.37 0.00 - 0.85 K/uL    Eos (Absolute) 0.21 0.00 - 0.51 K/uL    Baso (Absolute) 0.03 0.00 - 0.12 K/uL    Immature Granulocytes (abs) 0.01 0.00 - 0.11 K/uL    NRBC (Absolute) 0.00 K/uL   Basic Metabolic Panel   Result Value Ref Range    Sodium 141 135 - 145 mmol/L    Potassium 3.5 (L) 3.6 - 5.5 mmol/L    Chloride 105 96 - 112 mmol/L    Co2 24 20 - 33 mmol/L    Glucose 89 65 - 99 mg/dL     Bun 8 8 - 22 mg/dL    Creatinine 0.69 0.50 - 1.40 mg/dL    Calcium 9.4 8.5 - 10.5 mg/dL    Anion Gap 12.0 7.0 - 16.0   ESTIMATED GFR   Result Value Ref Range    GFR If African American >60 >60 mL/min/1.73 m 2    GFR If Non African American >60 >60 mL/min/1.73 m 2   Magnesium   Result Value Ref Range    Magnesium 2.3 1.5 - 2.5 mg/dL       All labs reviewed by me.    COURSE & MEDICAL DECISION MAKING  Nursing notes, VS, PMSFHx reviewed in chart.    4:41 PM Patient seen and examined at bedside. I verified that the patient was wearing a mask and I was wearing appropriate PPE every time I entered the room. The patient's mask was on the patient at all times during my encounter except for a brief view of the oropharynx. Discussed plan of care with patient. I informed them that labs will be ordered to evaluate symptoms. The patient is understanding and agreeable with plan of care. Patient will be treated with 600 mg Cleocin and 0.5 mg Dilaudid. Ordered CBC w/ Differential and BMP to evaluate her symptoms. The differential diagnoses include but are not limited to: Facial cellulitis unresolved, stye     6:05 PM Ordered Estimated GFR to evaluate the patient's symptoms    6:45 PM Patient was reevaluated at bedside. Discussed lab and radiology results with the patient and informed them that there were abnormal findings. The plan of care was discussed with the patient. She is understanding and agreeable to the plan of care. The plan for hospitalization was discussed with the patient due to her current condition. She is understanding and agreeable to the plan of care.     7:19 PM I discussed the patient's case and the above findings with Dr. Bustamante (Hospitalist) who agrees with the plan of care and agrees to hospitalize the patient due to her current condition.     8:11 PM Patient will be treated with 0.5 mg Dilaudid to alleviate her symptoms       DISPOSITION:  Patient will be hospitalized by Dr. Bustamante in [facility]  condition.      FINAL IMPRESSION  1. Facial cellulitis          Kortney JENNINGS (Scribe), am scribing for, and in the presence of, Ami Thrasher M.D..    Electronically signed by: Kortney Ruano (Scribe), 9/2/2020    Ami JENNINGS M.D. personally performed the services described in this documentation, as scribed by Kortney Ruano in my presence, and it is both accurate and complete. C.    The note accurately reflects work and decisions made by me.  Ami Thrasher M.D.  9/2/2020  10:51 PM

## 2020-09-03 LAB
ALBUMIN SERPL BCP-MCNC: 3.8 G/DL (ref 3.2–4.9)
ALBUMIN/GLOB SERPL: 1.3 G/DL
ALP SERPL-CCNC: 104 U/L (ref 30–99)
ALT SERPL-CCNC: 13 U/L (ref 2–50)
ANION GAP SERPL CALC-SCNC: 12 MMOL/L (ref 7–16)
AST SERPL-CCNC: 11 U/L (ref 12–45)
BASOPHILS # BLD AUTO: 0.5 % (ref 0–1.8)
BASOPHILS # BLD: 0.04 K/UL (ref 0–0.12)
BILIRUB SERPL-MCNC: 0.5 MG/DL (ref 0.1–1.5)
BUN SERPL-MCNC: 8 MG/DL (ref 8–22)
CALCIUM SERPL-MCNC: 9.2 MG/DL (ref 8.5–10.5)
CHLORIDE SERPL-SCNC: 106 MMOL/L (ref 96–112)
CO2 SERPL-SCNC: 22 MMOL/L (ref 20–33)
COVID ORDER STATUS COVID19: NORMAL
CREAT SERPL-MCNC: 0.63 MG/DL (ref 0.5–1.4)
EOSINOPHIL # BLD AUTO: 0.23 K/UL (ref 0–0.51)
EOSINOPHIL NFR BLD: 3 % (ref 0–6.9)
ERYTHROCYTE [DISTWIDTH] IN BLOOD BY AUTOMATED COUNT: 43.5 FL (ref 35.9–50)
GLOBULIN SER CALC-MCNC: 3 G/DL (ref 1.9–3.5)
GLUCOSE SERPL-MCNC: 96 MG/DL (ref 65–99)
HCT VFR BLD AUTO: 36.9 % (ref 37–47)
HGB BLD-MCNC: 12 G/DL (ref 12–16)
IMM GRANULOCYTES # BLD AUTO: 0.01 K/UL (ref 0–0.11)
IMM GRANULOCYTES NFR BLD AUTO: 0.1 % (ref 0–0.9)
LYMPHOCYTES # BLD AUTO: 2.24 K/UL (ref 1–4.8)
LYMPHOCYTES NFR BLD: 29.2 % (ref 22–41)
MCH RBC QN AUTO: 27.5 PG (ref 27–33)
MCHC RBC AUTO-ENTMCNC: 32.5 G/DL (ref 33.6–35)
MCV RBC AUTO: 84.4 FL (ref 81.4–97.8)
MONOCYTES # BLD AUTO: 0.41 K/UL (ref 0–0.85)
MONOCYTES NFR BLD AUTO: 5.3 % (ref 0–13.4)
NEUTROPHILS # BLD AUTO: 4.75 K/UL (ref 2–7.15)
NEUTROPHILS NFR BLD: 61.9 % (ref 44–72)
NRBC # BLD AUTO: 0 K/UL
NRBC BLD-RTO: 0 /100 WBC
PLATELET # BLD AUTO: 275 K/UL (ref 164–446)
PMV BLD AUTO: 9.4 FL (ref 9–12.9)
POTASSIUM SERPL-SCNC: 4.1 MMOL/L (ref 3.6–5.5)
PROT SERPL-MCNC: 6.8 G/DL (ref 6–8.2)
RBC # BLD AUTO: 4.37 M/UL (ref 4.2–5.4)
SARS-COV-2 RNA RESP QL NAA+PROBE: NOTDETECTED
SODIUM SERPL-SCNC: 140 MMOL/L (ref 135–145)
SPECIMEN SOURCE: NORMAL
WBC # BLD AUTO: 7.7 K/UL (ref 4.8–10.8)

## 2020-09-03 PROCEDURE — G0378 HOSPITAL OBSERVATION PER HR: HCPCS

## 2020-09-03 PROCEDURE — 700105 HCHG RX REV CODE 258: Performed by: INTERNAL MEDICINE

## 2020-09-03 PROCEDURE — 99225 PR SUBSEQUENT OBSERVATION CARE,LEVEL II: CPT | Performed by: HOSPITALIST

## 2020-09-03 PROCEDURE — 85025 COMPLETE CBC W/AUTO DIFF WBC: CPT

## 2020-09-03 PROCEDURE — 700102 HCHG RX REV CODE 250 W/ 637 OVERRIDE(OP): Performed by: HOSPITALIST

## 2020-09-03 PROCEDURE — C9803 HOPD COVID-19 SPEC COLLECT: HCPCS | Performed by: HOSPITALIST

## 2020-09-03 PROCEDURE — 700111 HCHG RX REV CODE 636 W/ 250 OVERRIDE (IP): Performed by: NURSE PRACTITIONER

## 2020-09-03 PROCEDURE — U0003 INFECTIOUS AGENT DETECTION BY NUCLEIC ACID (DNA OR RNA); SEVERE ACUTE RESPIRATORY SYNDROME CORONAVIRUS 2 (SARS-COV-2) (CORONAVIRUS DISEASE [COVID-19]), AMPLIFIED PROBE TECHNIQUE, MAKING USE OF HIGH THROUGHPUT TECHNOLOGIES AS DESCRIBED BY CMS-2020-01-R: HCPCS

## 2020-09-03 PROCEDURE — 700102 HCHG RX REV CODE 250 W/ 637 OVERRIDE(OP): Performed by: NURSE PRACTITIONER

## 2020-09-03 PROCEDURE — 80053 COMPREHEN METABOLIC PANEL: CPT

## 2020-09-03 PROCEDURE — 700111 HCHG RX REV CODE 636 W/ 250 OVERRIDE (IP): Performed by: INTERNAL MEDICINE

## 2020-09-03 PROCEDURE — A9270 NON-COVERED ITEM OR SERVICE: HCPCS | Performed by: HOSPITALIST

## 2020-09-03 PROCEDURE — A9270 NON-COVERED ITEM OR SERVICE: HCPCS | Performed by: INTERNAL MEDICINE

## 2020-09-03 PROCEDURE — 700102 HCHG RX REV CODE 250 W/ 637 OVERRIDE(OP): Performed by: INTERNAL MEDICINE

## 2020-09-03 PROCEDURE — A9270 NON-COVERED ITEM OR SERVICE: HCPCS | Performed by: NURSE PRACTITIONER

## 2020-09-03 PROCEDURE — 36415 COLL VENOUS BLD VENIPUNCTURE: CPT

## 2020-09-03 PROCEDURE — 700101 HCHG RX REV CODE 250: Performed by: INTERNAL MEDICINE

## 2020-09-03 RX ORDER — CLINDAMYCIN HYDROCHLORIDE 150 MG/1
450 CAPSULE ORAL EVERY 8 HOURS
Status: DISCONTINUED | OUTPATIENT
Start: 2020-09-03 | End: 2020-09-04 | Stop reason: HOSPADM

## 2020-09-03 RX ADMIN — CLINDAMYCIN HYDROCHLORIDE 450 MG: 150 CAPSULE ORAL at 20:14

## 2020-09-03 RX ADMIN — SODIUM CHLORIDE: 9 INJECTION, SOLUTION INTRAVENOUS at 11:59

## 2020-09-03 RX ADMIN — MORPHINE SULFATE 2 MG: 4 INJECTION INTRAVENOUS at 18:44

## 2020-09-03 RX ADMIN — MORPHINE SULFATE 2 MG: 4 INJECTION INTRAVENOUS at 09:28

## 2020-09-03 RX ADMIN — MORPHINE SULFATE 2 MG: 4 INJECTION INTRAVENOUS at 13:46

## 2020-09-03 RX ADMIN — QUETIAPINE FUMARATE 50 MG: 25 TABLET ORAL at 20:14

## 2020-09-03 RX ADMIN — NICOTINE TRANSDERMAL SYSTEM 21 MG: 21 PATCH, EXTENDED RELEASE TRANSDERMAL at 04:49

## 2020-09-03 RX ADMIN — MORPHINE SULFATE 2 MG: 4 INJECTION INTRAVENOUS at 22:57

## 2020-09-03 RX ADMIN — MORPHINE SULFATE 2 MG: 4 INJECTION INTRAVENOUS at 04:59

## 2020-09-03 RX ADMIN — CLINDAMYCIN IN 5 PERCENT DEXTROSE 600 MG: 12 INJECTION, SOLUTION INTRAVENOUS at 09:28

## 2020-09-03 RX ADMIN — CLINDAMYCIN HYDROCHLORIDE 450 MG: 150 CAPSULE ORAL at 13:46

## 2020-09-03 RX ADMIN — ONDANSETRON 4 MG: 2 INJECTION INTRAMUSCULAR; INTRAVENOUS at 20:13

## 2020-09-03 RX ADMIN — ENOXAPARIN SODIUM 40 MG: 40 INJECTION SUBCUTANEOUS at 04:49

## 2020-09-03 RX ADMIN — CLINDAMYCIN IN 5 PERCENT DEXTROSE 600 MG: 12 INJECTION, SOLUTION INTRAVENOUS at 02:59

## 2020-09-03 ASSESSMENT — PAIN DESCRIPTION - PAIN TYPE
TYPE: ACUTE PAIN

## 2020-09-03 ASSESSMENT — ENCOUNTER SYMPTOMS
VOMITING: 0
PALPITATIONS: 0
DIZZINESS: 0
HEMOPTYSIS: 0
ORTHOPNEA: 0
NAUSEA: 0
COUGH: 0
SPUTUM PRODUCTION: 0
CHILLS: 0

## 2020-09-03 ASSESSMENT — PATIENT HEALTH QUESTIONNAIRE - PHQ9
2. FEELING DOWN, DEPRESSED, IRRITABLE, OR HOPELESS: NOT AT ALL
1. LITTLE INTEREST OR PLEASURE IN DOING THINGS: NOT AT ALL
SUM OF ALL RESPONSES TO PHQ9 QUESTIONS 1 AND 2: 0

## 2020-09-03 NOTE — ED NOTES
POC discussed with pt.  Verbalized understanding.  No needs at this time   TRANSFER - OUT REPORT: 
 
Verbal report given to CAMMIE Casper(name) on Kourtney Altamirano  being transferred to [facility](unit) for routine progression of care Report consisted of patients Situation, Background, Assessment and  
Recommendations(SBAR). Information from the following report(s) SBAR, Kardex, Intake/Output, MAR and Recent Results was reviewed with the receiving nurse. Lines:  
Peripheral IV 06/02/20 Left Antecubital (Active) Site Assessment Clean, dry, & intact 6/2/2020  3:03 PM  
Phlebitis Assessment 0 6/2/2020  3:03 PM  
Infiltration Assessment 0 6/2/2020  3:03 PM  
Dressing Status Clean, dry, & intact; Occlusive 6/2/2020  3:03 PM  
Dressing Type 4 X 4;Tape;Transparent 6/2/2020  3:03 PM  
Hub Color/Line Status Pink;Flushed;Patent 6/2/2020  3:03 PM  
   
Peripheral IV 06/02/20 Right Antecubital (Active) Site Assessment Clean, dry, & intact 6/2/2020  5:34 PM  
Phlebitis Assessment 0 6/2/2020  5:34 PM  
Infiltration Assessment 0 6/2/2020  5:34 PM  
Dressing Status Clean, dry, & intact 6/2/2020  5:34 PM  
Hub Color/Line Status Pink 6/2/2020  5:34 PM  
Action Taken Blood drawn 6/2/2020  5:34 PM  
  
 
Opportunity for questions and clarification was provided.

## 2020-09-03 NOTE — ASSESSMENT & PLAN NOTE
Patient has a thickening and swelling in her right ear, it is painful  Interestingly the CT scan from 8/20/2020 does not show any significant abnormalities in the right ear, there is possible chronic disease on the left  Continue clindamycin, I will change to PO

## 2020-09-03 NOTE — PROGRESS NOTES
[facility] Medicine Daily Progress Note    Date of Service  9/3/2020    Chief Complaint  45 y.o. female admitted 9/2/2020 with right ear pain and redness.    Hospital Course    Ms Dunn has a history of history of Crohn's disease.  She presented the emergency room with cellulitis of her right ear.  Patient started on antibiotics and admitted hospital.      Interval Problem Update  Patient complains of pain in her right ear, it is little bit better than yesterday, reports relief with morphine and Norco  Tolerating diet, no nausea vomiting  Complains of fatigue    Consultants/Specialty  None    Code Status  Full Code    Disposition  If patient continues to improve with p.o. clindamycin, can likely be discharged home tomorrow    Review of Systems  Review of Systems   Constitutional: Negative for chills and malaise/fatigue.   Respiratory: Negative for cough, hemoptysis and sputum production.    Cardiovascular: Negative for chest pain, palpitations and orthopnea.   Gastrointestinal: Negative for nausea and vomiting.   Skin: Negative for itching and rash.   Neurological: Negative for dizziness.   All other systems reviewed and are negative.       Physical Exam  Temp:  [36 °C (96.8 °F)-37 °C (98.6 °F)] 36.6 °C (97.8 °F)  Pulse:  [] 70  Resp:  [16-20] 17  BP: ()/(65-93) 118/81  SpO2:  [92 %-100 %] 97 %    Physical Exam  Constitutional:       General: She is not in acute distress.     Appearance: Normal appearance. She is normal weight.   HENT:      Head: Normocephalic and atraumatic.      Right Ear: External ear normal.      Left Ear: External ear normal.      Ears:      Comments: Right external ear is erythematous, skin is indurated, tender to palpation     Nose: Nose normal.      Mouth/Throat:      Mouth: Mucous membranes are moist.      Pharynx: Oropharynx is clear.      Comments: Poor dentition  Eyes:      Extraocular Movements: Extraocular movements intact.      Conjunctiva/sclera: Conjunctivae normal.       Pupils: Pupils are equal, round, and reactive to light.   Neck:      Musculoskeletal: Normal range of motion and neck supple.   Cardiovascular:      Rate and Rhythm: Normal rate and regular rhythm.      Pulses: Normal pulses.   Pulmonary:      Effort: Pulmonary effort is normal.      Breath sounds: Normal breath sounds.   Abdominal:      General: Abdomen is flat. Bowel sounds are normal.      Palpations: Abdomen is soft.   Musculoskeletal: Normal range of motion.   Skin:     General: Skin is warm and dry.      Capillary Refill: Capillary refill takes less than 2 seconds.      Coloration: Skin is not jaundiced.   Neurological:      General: No focal deficit present.      Mental Status: She is alert and oriented to person, place, and time.      Cranial Nerves: No cranial nerve deficit.      Gait: Gait normal.   Psychiatric:         Mood and Affect: Mood normal.         Behavior: Behavior normal.         Fluids    Intake/Output Summary (Last 24 hours) at 9/3/2020 0836  Last data filed at 9/3/2020 0800  Gross per 24 hour   Intake 240 ml   Output --   Net 240 ml       Laboratory  Recent Labs     09/02/20  1805 09/03/20  0035   WBC 7.3 7.7   RBC 4.65 4.37   HEMOGLOBIN 13.0 12.0   HEMATOCRIT 38.8 36.9*   MCV 83.4 84.4   MCH 28.0 27.5   MCHC 33.5* 32.5*   RDW 43.7 43.5   PLATELETCT 288 275   MPV 9.0 9.4     Recent Labs     09/02/20  1805 09/03/20  0035   SODIUM 141 140   POTASSIUM 3.5* 4.1   CHLORIDE 105 106   CO2 24 22   GLUCOSE 89 96   BUN 8 8   CREATININE 0.69 0.63   CALCIUM 9.4 9.2                   Imaging  No orders to display        Assessment/Plan  Cellulitis of auricle of right ear- (present on admission)  Assessment & Plan  Patient has a thickening and swelling in her right ear, it is painful  Interestingly the CT scan from 8/20/2020 does not show any significant abnormalities in the right ear, there is possible chronic disease on the left  Continue clindamycin, I will change to PO    Hypokalemia  Assessment &  Plan  Replaced    Crohn disease (HCC)- (present on admission)  Assessment & Plan  She has history of Crohn's disease per  Outpatient follow-up       VTE prophylaxis: Lovenox

## 2020-09-03 NOTE — ED NOTES
Med rec updated and complete. Allergies reviewed. Met with pt at bedside. Dicussed current medications and last doses taken.      Pt finished a 3 day course of Doxycycline on  08/26/20. One dose was taken on 08/26/20.      Home pharmacy [address]

## 2020-09-03 NOTE — PROGRESS NOTES
Admitted patient. She c/o nausea and requests a nicotine patch. Called md to request meds. Will f/u

## 2020-09-03 NOTE — H&P
Hospital Medicine History & Physical Note    Date of Service  9/2/2020    Primary Care Physician  No primary care provider on file.    Consultants  None    Code Status  Prior    Chief Complaint  Chief Complaint   Patient presents with   • Ear Pain   • Facial Pain   • Eye Pain       History of Presenting Illness    45 y.o. female with past medical history of Crohn disease who presented to the hospital on 9/2/2020 with complaint of right ear pain and redness for 2 weeks.  She reported she has been having these complaints for 10 years and off-and-on she developed swelling and redness on her right ear.  She recently moved from [place] and per patient over there the physician was planning to place drain in her ear.  She presented to the hospital on August 28, 2020 and she left AGAINST MEDICAL ADVICE.  She denies any complaint of nausea, vomiting, diarrhea and constipation.  She does not have any specific radiation of her pain.  She takes edible.  She is a current smoker and she denies using any drugs and drinking alcohol in excessive amount.    ER course: I started on clindamycin as she is allergic to penicillins.  Discussed plan of care with patient and her  at the bedside.  She is allergic to tramadol and she is okay with Norco and IV morphine.  I discussed with her pain management.    I discussed plan of care with ER physician Dr. Thrasher.    Review of Systems  Review of Systems   Constitutional: Negative for chills, fever and weight loss.   HENT: Negative for hearing loss and tinnitus.    Eyes: Negative for blurred vision, double vision, photophobia and pain.   Respiratory: Negative for cough, sputum production and shortness of breath.    Cardiovascular: Negative for chest pain, palpitations, orthopnea and leg swelling.   Gastrointestinal: Negative for abdominal pain, constipation, diarrhea, nausea and vomiting.   Genitourinary: Negative for dysuria, frequency and urgency.   Musculoskeletal: Positive for  myalgias. Negative for back pain, joint pain and neck pain.   Skin: Positive for rash.   Neurological: Negative for dizziness, tingling, tremors, sensory change, speech change, focal weakness and headaches.   Psychiatric/Behavioral: Negative for hallucinations and substance abuse.   All other systems reviewed and are negative.      Past Medical History   has a past medical history of Anxiety, Bradycardia, Crohn's disease ([facility]), MRSA (methicillin resistant Staphylococcus aureus), and RA (rheumatoid arthritis) ([facility]).    Surgical History   has a past surgical history that includes appendectomy and cholecystectomy.     Family History  Not pertinent.    Social History   reports that she has been smoking cigarettes. She has a 16.00 pack-year smoking history. She does not have any smokeless tobacco history on file. She reports current alcohol use. She reports that she does not use drugs.    Allergies  Allergies   Allergen Reactions   • Pcn [Penicillins] Anaphylaxis   • Toradol Hives     Migraine     • Tramadol Hives     migraine     • Bactrim [Sulfamethoxazole W-Trimethoprim] Itching     Face itching     • Stadol [Butorphanol] Unspecified     Pt reports it makes her feel like she can't wake up       Medications  Prior to Admission Medications   Prescriptions Last Dose Informant Patient Reported? Taking?   quetiapine (SEROQUEL) 50 MG tablet 9/1/2020 at 2200 Patient Yes No   Sig: Take 50 mg by mouth every bedtime.      Facility-Administered Medications: None       Physical Exam  Temp:  [37 °C (98.6 °F)] 37 °C (98.6 °F)  Pulse:  [] 94  Resp:  [20] 20  BP: (108-140)/(67-92) 140/85  SpO2:  [92 %-100 %] 92 %    Physical Exam  Vitals signs reviewed.   Constitutional:       General: She is not in acute distress.     Appearance: Normal appearance. She is not ill-appearing.   HENT:      Head: Normocephalic and atraumatic.      Nose: No congestion.   Eyes:      General:         Right eye: No discharge.         Left eye: No  discharge.      Pupils: Pupils are equal, round, and reactive to light.      Comments: Stye present on right upper eyelid   Neck:      Musculoskeletal: Normal range of motion. No neck rigidity.   Cardiovascular:      Rate and Rhythm: Normal rate and regular rhythm.      Pulses: Normal pulses.      Heart sounds: Normal heart sounds. No murmur.   Pulmonary:      Effort: Pulmonary effort is normal. No respiratory distress.      Breath sounds: Normal breath sounds. No stridor.   Abdominal:      General: Bowel sounds are normal. There is no distension.      Palpations: Abdomen is soft.      Tenderness: There is no abdominal tenderness.   Musculoskeletal: Normal range of motion.         General: No swelling or tenderness.   Skin:     General: Skin is warm.      Capillary Refill: Capillary refill takes less than 2 seconds.      Coloration: Skin is not jaundiced or pale.      Findings: Erythema (Around the right face and ear) present. No bruising.   Neurological:      General: No focal deficit present.      Mental Status: She is alert and oriented to person, place, and time.      Cranial Nerves: No cranial nerve deficit.   Psychiatric:         Mood and Affect: Mood normal.         Behavior: Behavior normal.         Laboratory:  Recent Labs     09/02/20  1805   WBC 7.3   RBC 4.65   HEMOGLOBIN 13.0   HEMATOCRIT 38.8   MCV 83.4   MCH 28.0   MCHC 33.5*   RDW 43.7   PLATELETCT 288   MPV 9.0     Recent Labs     09/02/20  1805   SODIUM 141   POTASSIUM 3.5*   CHLORIDE 105   CO2 24   GLUCOSE 89   BUN 8   CREATININE 0.69   CALCIUM 9.4     Recent Labs     09/02/20  1805   GLUCOSE 89         No results for input(s): NTPROBNP in the last 72 hours.      No results for input(s): TROPONINT in the last 72 hours.    Imaging:  No orders to display         Assessment/Plan:  I anticipate this patient is appropriate for observation status at this time.    Cellulitis of auricle of right ear- (present on admission)  Assessment & Plan  Started on  IV clindamycin as she is allergic to penicillin and Bactrim.  Discussed about pain management with her.  He is allergic to tramadol and she is okay with Norco and morphine.  Discussed with her that use narcotic pain medication with caution.  Monitor for adverse effect of pain medications.  Blood cultures obtained on admission.  I reviewed CT scan which she had on August 28, 2020      Hypokalemia  Assessment & Plan  She found to hypokalemia and replaced potassium  Continue to monitor and replace as needed.    Crohn disease (HCC)- (present on admission)  Assessment & Plan  She has history of Crohn's disease per  She will need outpatient follow-up.
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
## 2020-09-04 VITALS
HEART RATE: 82 BPM | TEMPERATURE: 98.5 F | HEIGHT: 68 IN | DIASTOLIC BLOOD PRESSURE: 76 MMHG | BODY MASS INDEX: 22.49 KG/M2 | WEIGHT: 148.37 LBS | SYSTOLIC BLOOD PRESSURE: 112 MMHG | OXYGEN SATURATION: 95 % | RESPIRATION RATE: 17 BRPM

## 2020-09-04 PROBLEM — E87.6 HYPOKALEMIA: Status: RESOLVED | Noted: 2020-09-02 | Resolved: 2020-09-04

## 2020-09-04 PROCEDURE — 700111 HCHG RX REV CODE 636 W/ 250 OVERRIDE (IP): Performed by: INTERNAL MEDICINE

## 2020-09-04 PROCEDURE — 700111 HCHG RX REV CODE 636 W/ 250 OVERRIDE (IP): Performed by: NURSE PRACTITIONER

## 2020-09-04 PROCEDURE — 99217 PR OBSERVATION CARE DISCHARGE: CPT | Performed by: HOSPITALIST

## 2020-09-04 PROCEDURE — 700102 HCHG RX REV CODE 250 W/ 637 OVERRIDE(OP): Performed by: HOSPITALIST

## 2020-09-04 PROCEDURE — 700102 HCHG RX REV CODE 250 W/ 637 OVERRIDE(OP): Performed by: NURSE PRACTITIONER

## 2020-09-04 PROCEDURE — 700102 HCHG RX REV CODE 250 W/ 637 OVERRIDE(OP): Performed by: INTERNAL MEDICINE

## 2020-09-04 PROCEDURE — A9270 NON-COVERED ITEM OR SERVICE: HCPCS | Performed by: HOSPITALIST

## 2020-09-04 PROCEDURE — G0378 HOSPITAL OBSERVATION PER HR: HCPCS

## 2020-09-04 PROCEDURE — A9270 NON-COVERED ITEM OR SERVICE: HCPCS | Performed by: INTERNAL MEDICINE

## 2020-09-04 PROCEDURE — A9270 NON-COVERED ITEM OR SERVICE: HCPCS | Performed by: NURSE PRACTITIONER

## 2020-09-04 RX ORDER — HYDROCODONE BITARTRATE AND ACETAMINOPHEN 5; 325 MG/1; MG/1
1-2 TABLET ORAL EVERY 8 HOURS PRN
Qty: 8 TAB | Refills: 0 | Status: SHIPPED | OUTPATIENT
Start: 2020-09-04 | End: 2020-09-07

## 2020-09-04 RX ORDER — QUETIAPINE FUMARATE 50 MG/1
50 TABLET, FILM COATED ORAL
Qty: 30 TAB | Refills: 1 | Status: SHIPPED | OUTPATIENT
Start: 2020-09-04 | End: 2020-10-09

## 2020-09-04 RX ORDER — CLINDAMYCIN HYDROCHLORIDE 150 MG/1
450 CAPSULE ORAL EVERY 8 HOURS
Qty: 21 CAP | Refills: 0 | Status: SHIPPED | OUTPATIENT
Start: 2020-09-04 | End: 2020-09-29

## 2020-09-04 RX ADMIN — MORPHINE SULFATE 2 MG: 4 INJECTION INTRAVENOUS at 04:22

## 2020-09-04 RX ADMIN — ENOXAPARIN SODIUM 40 MG: 40 INJECTION SUBCUTANEOUS at 04:22

## 2020-09-04 RX ADMIN — ACETAMINOPHEN 650 MG: 325 TABLET, FILM COATED ORAL at 08:58

## 2020-09-04 RX ADMIN — NICOTINE TRANSDERMAL SYSTEM 21 MG: 21 PATCH, EXTENDED RELEASE TRANSDERMAL at 04:21

## 2020-09-04 RX ADMIN — CLINDAMYCIN HYDROCHLORIDE 450 MG: 150 CAPSULE ORAL at 04:21

## 2020-09-04 ASSESSMENT — PAIN DESCRIPTION - PAIN TYPE
TYPE: ACUTE PAIN
TYPE: ACUTE PAIN

## 2020-09-04 NOTE — DISCHARGE SUMMARY
Discharge Summary    CHIEF COMPLAINT ON ADMISSION  Chief Complaint   Patient presents with   • Ear Pain   • Facial Pain   • Eye Pain       Reason for Admission  Ear Pain     Admission Date  9/2/2020    CODE STATUS  Full Code    HPI & HOSPITAL COURSE  This is a 45 y.o. female here with ear pain.    Ms Dunn has a history of history of Crohn's disease.  She also has history of ear pain and mastoiditis.  She presented the emergency room with cellulitis of her right ear.  CT scan done on a prior encounter actually showed abnormalities at the left ear but no mastoiditis on the right.  Due to multiple medication allergies, she was started on clindamycin.  The area of erythema and induration at her hear has improved significantly, she remains afebrile and is tolerating the clindamycin.  The patient can be discharged home today, I have prescribed her 7 more days of antibiotics.  The patient is having ongoing pain at the ear and I have prescribed 8 5/325 norco tablets for pain control.  Her [place] prescription history was reviewed and there are no records.    The patient has been provided with information on low cost clinics in the area and encouraged to establish care and follow up.     Therefore, she is discharged in fair and stable condition to home with close outpatient follow-up.    Discharge Date  9/4/2020    FOLLOW UP ITEMS POST DISCHARGE      DISCHARGE DIAGNOSES  Active Problems:    Cellulitis of auricle of right ear POA: Yes    Crohn disease (HCC) POA: Yes  Resolved Problems:    Hypokalemia POA: Unknown      FOLLOW UP  No future appointments.  [address] [address]  [phone]  In 1 week      [address] [id number] [phone]  In 1 week        MEDICATIONS ON DISCHARGE     Medication List      START taking these medications      Instructions   clindamycin 150 MG Caps  Commonly known as: CLEOCIN   Take 3 Caps by mouth  every 8 hours.  Dose: 450 mg     HYDROcodone-acetaminophen 5-325 MG Tabs per tablet  Commonly known as: NORCO   Take 1-2 Tabs by mouth every 8 hours as needed for up to 3 days.  Dose: 1-2 Tab        CONTINUE taking these medications      Instructions   SEROquel 50 MG tablet  Generic drug: quetiapine   Take 50 mg by mouth every bedtime.  Dose: 50 mg            Allergies  Allergies   Allergen Reactions   • Pcn [Penicillins] Anaphylaxis   • Toradol Hives     Migraine     • Tramadol Hives     migraine     • Bactrim [Sulfamethoxazole W-Trimethoprim] Itching     Face itching     • Stadol [Butorphanol] Unspecified     Pt reports it makes her feel like she can't wake up   • Fish Swelling   • Tree Nuts Food Allergy        DIET  Orders Placed This Encounter   Procedures   • Diet Order Regular     Standing Status:   Standing     Number of Occurrences:   1     Order Specific Question:   Diet:     Answer:   Regular [1]       ACTIVITY  As tolerated.  Weight bearing as tolerated    CONSULTATIONS  None    PROCEDURES  CT Scan 8/28/2020:  1.  Soft tissue thickening or more likely cerumen debris in the right and left external auditory canals which appears fairly symmetric.  2.  Unremarkable right mastoid and middle ear cavity with no evidence of active or chronic inflammatory middle ear mastoid disease.  3.  Scattered moderate-sized left mastoid air cells with intervening sclerotic mastoid bone consistent with chronic/active inflammatory mastoid disease. The left middle ear cavity and mastoid antrum are clear.    LABORATORY  Lab Results   Component Value Date    SODIUM 140 09/03/2020    POTASSIUM 4.1 09/03/2020    CHLORIDE 106 09/03/2020    CO2 22 09/03/2020    GLUCOSE 96 09/03/2020    BUN 8 09/03/2020    CREATININE 0.63 09/03/2020        Lab Results   Component Value Date    WBC 7.7 09/03/2020    HEMOGLOBIN 12.0 09/03/2020    HEMATOCRIT 36.9 (L) 09/03/2020    PLATELETCT 275 09/03/2020        Total time of the discharge process  exceeds 35 minutes.

## 2020-09-04 NOTE — DISCHARGE INSTRUCTIONS
Discharge Instructions    Discharged to home by car with relative. Discharged via walking, hospital escort: Yes.  Special equipment needed: Not Applicable    Be sure to schedule a follow-up appointment with your primary care doctor or any specialists as instructed.     Discharge Plan:   Diet Plan: Discussed  Activity Level: Discussed  Confirmed Follow up Appointment: Patient to Call and Schedule Appointment  Confirmed Symptoms Management: Discussed  Medication Reconciliation Updated: Yes    I understand that a diet low in cholesterol, fat, and sodium is recommended for good health. Unless I have been given specific instructions below for another diet, I accept this instruction as my diet prescription.   Other diet: Regular diet    Special Instructions: None    · Is patient discharged on Warfarin / Coumadin?   No     Depression / Suicide Risk    As you are discharged from this [facility] facility, it is important to learn how to keep safe from harming yourself.    Recognize the warning signs:  · Abrupt changes in personality, positive or negative- including increase in energy   · Giving away possessions  · Change in eating patterns- significant weight changes-  positive or negative  · Change in sleeping patterns- unable to sleep or sleeping all the time   · Unwillingness or inability to communicate  · Depression  · Unusual sadness, discouragement and loneliness  · Talk of wanting to die  · Neglect of personal appearance   · Rebelliousness- reckless behavior  · Withdrawal from people/activities they love  · Confusion- inability to concentrate     If you or a loved one observes any of these behaviors or has concerns about self-harm, here's what you can do:  · Talk about it- your feelings and reasons for harming yourself  · Remove any means that you might use to hurt yourself (examples: pills, rope, extension cords, firearm)  · Get professional help from the community (Mental Health, Substance Abuse, psychological  counseling)  · Do not be alone:Call your Safe Contact- someone whom you trust who will be there for you.  · Call your local CRISIS HOTLINE 629-1442 or 718-748-9932  · Call your local Children's Mobile Crisis Response Team [place] (369) 698-8487 or www.Lecere  · Call the toll free National Suicide Prevention Hotlines   · National Suicide Prevention Lifeline 877-340-ASKP (9107)  · National Hope Line Network 800-SUICIDE (254-5027)

## 2020-09-04 NOTE — PROGRESS NOTES
Bedside report received at change of shift, assumed care of pt.     Pt is axox4, complaining of pain 10/10 in her right side of face and head, especially in right ear. Educated pt about discontinuation of her IV pain medication in anticipation of today's discharge. Although very upset and teary, pt agreed to try tylenol. Discharge plan discussed and set for noon. Pt requesting seroquel prescription to help her get through until her appointment to establish care as a new [place] resident which is in about 1 month.

## 2020-09-04 NOTE — CARE PLAN
Problem: Communication  Goal: The ability to communicate needs accurately and effectively will improve  9/4/2020 1224 by Bertha Schrader R.N.  Outcome: PROGRESSING AS EXPECTED  9/4/2020 1224 by Bertha Schrader R.N.  Outcome: PROGRESSING AS EXPECTED     Problem: Safety  Goal: Will remain free from injury  9/4/2020 1224 by Bertha Schrader R.N.  Outcome: PROGRESSING AS EXPECTED  9/4/2020 1224 by Bertha Schrader R.N.  Outcome: PROGRESSING AS EXPECTED

## 2020-09-04 NOTE — PROGRESS NOTES
Pt arrived to discharge [place] and notified this RN that her ride is here.    Discharge instructions reviewed and questions answered. Pt instructed to make an appt with JAKE watts and FRANCI for 1 week f/u. Pt given script for Norco and patient [facility]  [facility] and [facility]. Pt instructed to complete antibiotic course. Pt ambulated to car and driven home.

## 2020-09-13 ENCOUNTER — HOSPITAL ENCOUNTER (EMERGENCY)
Facility: MEDICAL CENTER | Age: 45
End: 2020-09-13
Attending: EMERGENCY MEDICINE
Payer: OTHER GOVERNMENT

## 2020-09-13 ENCOUNTER — HOSPITAL ENCOUNTER (EMERGENCY)
Dept: HOSPITAL 8 - ED | Age: 45
Discharge: HOME | End: 2020-09-13
Payer: SELF-PAY

## 2020-09-13 VITALS — SYSTOLIC BLOOD PRESSURE: 125 MMHG | DIASTOLIC BLOOD PRESSURE: 85 MMHG

## 2020-09-13 VITALS — HEIGHT: 68 IN | BODY MASS INDEX: 23.19 KG/M2 | WEIGHT: 153 LBS

## 2020-09-13 VITALS
HEART RATE: 98 BPM | OXYGEN SATURATION: 99 % | RESPIRATION RATE: 16 BRPM | DIASTOLIC BLOOD PRESSURE: 95 MMHG | SYSTOLIC BLOOD PRESSURE: 131 MMHG | TEMPERATURE: 97.2 F | BODY MASS INDEX: 22.56 KG/M2 | HEIGHT: 68 IN

## 2020-09-13 DIAGNOSIS — M06.9: ICD-10-CM

## 2020-09-13 DIAGNOSIS — H60.11: Primary | ICD-10-CM

## 2020-09-13 DIAGNOSIS — Z90.49: ICD-10-CM

## 2020-09-13 DIAGNOSIS — R21 RASH: ICD-10-CM

## 2020-09-13 DIAGNOSIS — H92.01 EAR PAIN, RIGHT: ICD-10-CM

## 2020-09-13 DIAGNOSIS — M54.2: ICD-10-CM

## 2020-09-13 DIAGNOSIS — F17.200: ICD-10-CM

## 2020-09-13 LAB
ALBUMIN SERPL-MCNC: 3.6 G/DL (ref 3.4–5)
ANION GAP SERPL CALC-SCNC: 6 MMOL/L (ref 5–15)
BASOPHILS # BLD AUTO: 0.03 X10^3/UL (ref 0–0.1)
BASOPHILS NFR BLD AUTO: 0 % (ref 0–1)
CALCIUM SERPL-MCNC: 9.2 MG/DL (ref 8.5–10.1)
CHLORIDE SERPL-SCNC: 110 MMOL/L (ref 98–107)
CREAT SERPL-MCNC: 0.81 MG/DL (ref 0.55–1.02)
EOSINOPHIL # BLD AUTO: 0.26 X10^3/UL (ref 0–0.4)
EOSINOPHIL NFR BLD AUTO: 3 % (ref 1–7)
ERYTHROCYTE [DISTWIDTH] IN BLOOD BY AUTOMATED COUNT: 14.8 % (ref 9.6–15.2)
LYMPHOCYTES # BLD AUTO: 2.31 X10^3/UL (ref 1–3.4)
LYMPHOCYTES NFR BLD AUTO: 28 % (ref 22–44)
MCH RBC QN AUTO: 27.4 PG (ref 27–34.8)
MCHC RBC AUTO-ENTMCNC: 32.8 G/DL (ref 32.4–35.8)
MCV RBC AUTO: 83.4 FL (ref 80–100)
MD: NO
MONOCYTES # BLD AUTO: 0.19 X10^3/UL (ref 0.2–0.8)
MONOCYTES NFR BLD AUTO: 2 % (ref 2–9)
NEUTROPHILS # BLD AUTO: 5.6 X10^3/UL (ref 1.8–6.8)
NEUTROPHILS NFR BLD AUTO: 67 % (ref 42–75)
PLATELET # BLD AUTO: 333 X10^3/UL (ref 130–400)
PMV BLD AUTO: 6.8 FL (ref 7.4–10.4)
RBC # BLD AUTO: 4.9 X10^6/UL (ref 3.82–5.3)

## 2020-09-13 PROCEDURE — 80048 BASIC METABOLIC PNL TOTAL CA: CPT

## 2020-09-13 PROCEDURE — 83605 ASSAY OF LACTIC ACID: CPT

## 2020-09-13 PROCEDURE — 87040 BLOOD CULTURE FOR BACTERIA: CPT

## 2020-09-13 PROCEDURE — 36415 COLL VENOUS BLD VENIPUNCTURE: CPT

## 2020-09-13 PROCEDURE — 85025 COMPLETE CBC W/AUTO DIFF WBC: CPT

## 2020-09-13 PROCEDURE — 82040 ASSAY OF SERUM ALBUMIN: CPT

## 2020-09-13 PROCEDURE — 96375 TX/PRO/DX INJ NEW DRUG ADDON: CPT

## 2020-09-13 PROCEDURE — 96366 THER/PROPH/DIAG IV INF ADDON: CPT

## 2020-09-13 PROCEDURE — 70480 CT ORBIT/EAR/FOSSA W/O DYE: CPT

## 2020-09-13 PROCEDURE — 96365 THER/PROPH/DIAG IV INF INIT: CPT

## 2020-09-13 PROCEDURE — 99284 EMERGENCY DEPT VISIT MOD MDM: CPT

## 2020-09-13 PROCEDURE — 99281 EMR DPT VST MAYX REQ PHY/QHP: CPT

## 2020-09-13 RX ORDER — METHYLPREDNISOLONE 4 MG/1
4 TABLET ORAL DAILY
Qty: 7 TAB | Refills: 0 | Status: SHIPPED | OUTPATIENT
Start: 2020-09-13 | End: 2020-09-20

## 2020-09-13 RX ORDER — CLINDAMYCIN HYDROCHLORIDE 150 MG/1
300 CAPSULE ORAL 4 TIMES DAILY
Qty: 56 CAP | Refills: 0 | Status: SHIPPED | OUTPATIENT
Start: 2020-09-13 | End: 2020-09-20

## 2020-09-13 ASSESSMENT — LIFESTYLE VARIABLES: DO YOU DRINK ALCOHOL: NO

## 2020-09-13 NOTE — ED PROVIDER NOTES
ED Provider Note    CHIEF COMPLAINT  Chief Complaint   Patient presents with   • Ear Pain     Right > Left       HPI  Charley Dunn is a 45 y.o. female who presents to the emergency department with a chief complaint of right ear pain.  The patient initially had doxycycline prescribed for a right outer ear infection on 28 August she had a CT which showed no evidence of fluid within the external canal or mastoiditis, and then she was seen here on September 2 where she was admitted for outer ear infection and facial cellulitis.  She was treated overnight with IV clindamycin and then discharged the next day with clindamycin orally for 7 days.  She states she took those medications but now over the last several days she has noticed that her ear pain is returning.  Of note she has had a history of Crohn's disease as well as rheumatoid arthritis and has had recurrent problems with her right ear.  She does not have a provider in the area at this time    REVIEW OF SYSTEMS  Positive for right ear pain negative for fever drainage headache, she denies specifically any swelling of her joints that is new or bloody stool and all other systems are negative.  PAST MEDICAL HISTORY   has a past medical history of Anxiety, Bradycardia, Crohn's disease ([facility]), MRSA (methicillin resistant Staphylococcus aureus), and RA (rheumatoid arthritis) ([facility]).    SOCIAL HISTORY  Social History     Tobacco Use   • Smoking status: Current Every Day Smoker     Packs/day: 0.50     Years: 32.00     Pack years: 16.00     Types: Cigarettes   • Smokeless tobacco: Never Used   Substance and Sexual Activity   • Alcohol use: Not Currently     Comment: rare   • Drug use: Never   • Sexual activity: Not on file       SURGICAL HISTORY   has a past surgical history that includes other abdominal surgery and other.    CURRENT MEDICATIONS  Reviewed.  See Encounter Summary.    ALLERGIES  Allergies   Allergen Reactions   • Pcn [Penicillins] Anaphylaxis   • Toradol  "Hives     Migraine     • Tramadol Hives     migraine     • Bactrim [Sulfamethoxazole W-Trimethoprim] Itching     Face itching     • Stadol [Butorphanol] Unspecified     Pt reports it makes her feel like she can't wake up   • Fish Swelling   • Tree Nuts Food Allergy        PHYSICAL EXAM  VITAL SIGNS: /95   Pulse 98   Temp 36.2 °C (97.2 °F) (Temporal)   Resp 16   Ht 1.727 m (5' 8\")   SpO2 99%   BMI 22.56 kg/m²   Constitutional: Pleasant, Alert in no apparent distress.  HENT: Normocephalic, Bilateral external ears normal. Nose normal.  Right outer ear is not markedly swollen at this time, there is a scaly flaking rash posterior to the helix and down below the lobe of the ear, there are some grouped vesicles at this area carefully looks through her hair in the scalp and see no vesicles within the scalp themselves.  There is one isolated lesion on her chin right lateral.  The patient has no confluent erythema fever or drainage from the wound  Eyes: Pupils are equal and reactive. Conjunctiva normal, non-icteric.   Thorax & Lungs: Easy unlabored respirations  Abdomen:  No gross signs of peritonitis, no pain with movement   Skin: Visualized skin is  Dry, No erythema, No rash.   Extremities:   No edema, No asymmetry  Neurologic: Alert, Grossly non-focal.   Psychiatric: Affect and Mood normal      COURSE & MEDICAL DECISION MAKING  Nursing notes and vital signs were reviewed. (See chart for details)    The patient presents to the Emergency Department with what appears to be a flaky scaling rash on the right side of her ear with now increasing pain and discomfort.  She has a history of several autoimmune disorders and I do not know if this is a dermal to logical manifestation of her autoimmune disease.  I think that this may be an inflammatory process.  I am going to start her on a Medrol Dosepak and see if it clears her symptoms.  If it does not help her within a day I have also given her a prescription for " clindamycin to start taking again in case the infection was not completely healed.  She does not have a primary she will need outpatient follow-up with GI and rheumatology as well as dermatology so I have given her the number of the [facility] clinic to call tomorrow morning.  She knows if she develops fever increasing redness swelling or new and concerning symptoms she should return to the ER for repeat evaluation and care.  At this time she is hemodynamically stable.  She does not appear to have a cellulitis to me        DISPOSITION:  Patient will be discharged home in stable condition.    FOLLOW UP:  [address] [address]  [phone]  Schedule an appointment as soon as possible for a visit   call Monday at 8 am for new patient appointment      OUTPATIENT MEDICATIONS:  Discharge Medication List as of 9/13/2020  2:30 PM      START taking these medications    Details   methylPREDNISolone (MEDROL) 4 MG Tablet Therapy Pack Take 1 Tab by mouth every day for 7 days., Disp-7 Tab,R-0, Print Rx Paper      !! clindamycin (CLEOCIN) 150 MG Cap Take 2 Caps by mouth 4 times a day for 7 days., Disp-56 Cap,R-0, Print Rx Paper       !! - Potential duplicate medications found. Please discuss with provider.               The patient was discharged home with an information sheet on rash and told to return immediately for any signs or symptoms listed, but specifically if fever, vomiting, or any worsening at all.  The patient verbally agreed to the discharge precautions and follow-up plan which is documented in EPIC.    FINAL IMPRESSION  1. Ear Pain  2. Rash      Electronically signed by: Alayna Mckenna M.D., 9/13/2020 2:32 PM

## 2020-09-13 NOTE — ED TRIAGE NOTES
Ambulatory to triage with   Chief Complaint   Patient presents with   • Ear Pain     Right > Left   Hx of severe ear infections with MRSA. States recent hospital admission with DC on oral abx for same complaint. Denies fever or chills. Redness behind right ear. States current pain is 8/10.

## 2020-09-15 ENCOUNTER — HOSPITAL ENCOUNTER (INPATIENT)
Dept: HOSPITAL 8 - ED | Age: 45
LOS: 1 days | Discharge: LEFT BEFORE DISCHARGE | DRG: 603 | End: 2020-09-16
Attending: INTERNAL MEDICINE | Admitting: HOSPITALIST
Payer: COMMERCIAL

## 2020-09-15 VITALS — DIASTOLIC BLOOD PRESSURE: 75 MMHG | SYSTOLIC BLOOD PRESSURE: 109 MMHG

## 2020-09-15 VITALS — DIASTOLIC BLOOD PRESSURE: 85 MMHG | SYSTOLIC BLOOD PRESSURE: 142 MMHG

## 2020-09-15 VITALS — HEIGHT: 68 IN | WEIGHT: 163.8 LBS | BODY MASS INDEX: 24.83 KG/M2

## 2020-09-15 VITALS — DIASTOLIC BLOOD PRESSURE: 78 MMHG | SYSTOLIC BLOOD PRESSURE: 119 MMHG

## 2020-09-15 DIAGNOSIS — Z92.21: ICD-10-CM

## 2020-09-15 DIAGNOSIS — G89.29: ICD-10-CM

## 2020-09-15 DIAGNOSIS — K50.90: ICD-10-CM

## 2020-09-15 DIAGNOSIS — Z82.49: ICD-10-CM

## 2020-09-15 DIAGNOSIS — L03.211: Primary | ICD-10-CM

## 2020-09-15 DIAGNOSIS — Z90.49: ICD-10-CM

## 2020-09-15 DIAGNOSIS — K02.9: ICD-10-CM

## 2020-09-15 DIAGNOSIS — F41.9: ICD-10-CM

## 2020-09-15 DIAGNOSIS — H71.91: ICD-10-CM

## 2020-09-15 DIAGNOSIS — H70.90: ICD-10-CM

## 2020-09-15 DIAGNOSIS — H60.10: ICD-10-CM

## 2020-09-15 DIAGNOSIS — Z85.3: ICD-10-CM

## 2020-09-15 DIAGNOSIS — M06.9: ICD-10-CM

## 2020-09-15 DIAGNOSIS — F17.210: ICD-10-CM

## 2020-09-15 DIAGNOSIS — H70.10: ICD-10-CM

## 2020-09-15 DIAGNOSIS — Z80.49: ICD-10-CM

## 2020-09-15 DIAGNOSIS — Z82.5: ICD-10-CM

## 2020-09-15 DIAGNOSIS — Z86.14: ICD-10-CM

## 2020-09-15 DIAGNOSIS — Z86.19: ICD-10-CM

## 2020-09-15 LAB
ALBUMIN SERPL-MCNC: 3 G/DL (ref 3.4–5)
ANION GAP SERPL CALC-SCNC: 5 MMOL/L (ref 5–15)
BASOPHILS # BLD AUTO: 0.04 X10^3/UL (ref 0–0.1)
BASOPHILS NFR BLD AUTO: 1 % (ref 0–1)
CALCIUM SERPL-MCNC: 8.9 MG/DL (ref 8.5–10.1)
CHLORIDE SERPL-SCNC: 112 MMOL/L (ref 98–107)
CREAT SERPL-MCNC: 0.71 MG/DL (ref 0.55–1.02)
EOSINOPHIL # BLD AUTO: 0.34 X10^3/UL (ref 0–0.4)
EOSINOPHIL NFR BLD AUTO: 4 % (ref 1–7)
ERYTHROCYTE [DISTWIDTH] IN BLOOD BY AUTOMATED COUNT: 15.3 % (ref 9.6–15.2)
ERYTHROCYTE [SEDIMENTATION RATE] IN BLOOD BY WESTERGREN METHOD: 40 MM/HR (ref 0–20)
ERYTHROCYTE [SEDIMENTATION RATE] IN BLOOD BY WESTERGREN METHOD: 46 MM/HR (ref 0–20)
HCT (SEDRATE): 32.5 % (ref 34.6–47.8)
HCT (SEDRATE): 37.1 % (ref 34.6–47.8)
LYMPHOCYTES # BLD AUTO: 2.67 X10^3/UL (ref 1–3.4)
LYMPHOCYTES NFR BLD AUTO: 34 % (ref 22–44)
MCH RBC QN AUTO: 27.2 PG (ref 27–34.8)
MCHC RBC AUTO-ENTMCNC: 32.7 G/DL (ref 32.4–35.8)
MCV RBC AUTO: 83.4 FL (ref 80–100)
MD: NO
MONOCYTES # BLD AUTO: 0.43 X10^3/UL (ref 0.2–0.8)
MONOCYTES NFR BLD AUTO: 6 % (ref 2–9)
NEUTROPHILS # BLD AUTO: 4.42 X10^3/UL (ref 1.8–6.8)
NEUTROPHILS NFR BLD AUTO: 56 % (ref 42–75)
PLATELET # BLD AUTO: 326 X10^3/UL (ref 130–400)
PMV BLD AUTO: 6.9 FL (ref 7.4–10.4)
RBC # BLD AUTO: 4.44 X10^6/UL (ref 3.82–5.3)

## 2020-09-15 PROCEDURE — 70490 CT SOFT TISSUE NECK W/O DYE: CPT

## 2020-09-15 PROCEDURE — 84100 ASSAY OF PHOSPHORUS: CPT

## 2020-09-15 PROCEDURE — 80053 COMPREHEN METABOLIC PANEL: CPT

## 2020-09-15 PROCEDURE — 81003 URINALYSIS AUTO W/O SCOPE: CPT

## 2020-09-15 PROCEDURE — 85025 COMPLETE CBC W/AUTO DIFF WBC: CPT

## 2020-09-15 PROCEDURE — 87040 BLOOD CULTURE FOR BACTERIA: CPT

## 2020-09-15 PROCEDURE — 83735 ASSAY OF MAGNESIUM: CPT

## 2020-09-15 PROCEDURE — 86140 C-REACTIVE PROTEIN: CPT

## 2020-09-15 PROCEDURE — 85651 RBC SED RATE NONAUTOMATED: CPT

## 2020-09-15 PROCEDURE — 80048 BASIC METABOLIC PNL TOTAL CA: CPT

## 2020-09-15 PROCEDURE — 82040 ASSAY OF SERUM ALBUMIN: CPT

## 2020-09-15 PROCEDURE — 71046 X-RAY EXAM CHEST 2 VIEWS: CPT

## 2020-09-15 PROCEDURE — 36415 COLL VENOUS BLD VENIPUNCTURE: CPT

## 2020-09-15 PROCEDURE — 83605 ASSAY OF LACTIC ACID: CPT

## 2020-09-15 RX ADMIN — SODIUM CHLORIDE, SODIUM LACTATE, POTASSIUM CHLORIDE, AND CALCIUM CHLORIDE SCH MLS/HR: .6; .31; .03; .02 INJECTION, SOLUTION INTRAVENOUS at 14:00

## 2020-09-15 RX ADMIN — MORPHINE SULFATE PRN MG: 10 INJECTION INTRAVENOUS at 23:34

## 2020-09-15 RX ADMIN — MORPHINE SULFATE PRN MG: 10 INJECTION INTRAVENOUS at 20:31

## 2020-09-15 RX ADMIN — FAMOTIDINE SCH MG: 20 TABLET, FILM COATED ORAL at 21:00

## 2020-09-15 RX ADMIN — LACTOBACILLUS ACIDOPHILUS / LACTOBACILLUS BULGARICUS SCH GM: 100 MILLION CFU STRENGTH GRANULES at 16:56

## 2020-09-15 RX ADMIN — LACTOBACILLUS ACIDOPHILUS / LACTOBACILLUS BULGARICUS SCH GM: 100 MILLION CFU STRENGTH GRANULES at 20:30

## 2020-09-15 RX ADMIN — MORPHINE SULFATE PRN MG: 10 INJECTION INTRAVENOUS at 16:57

## 2020-09-15 RX ADMIN — VANCOMYCIN HYDROCHLORIDE SCH MLS/HR: 1 INJECTION, POWDER, LYOPHILIZED, FOR SOLUTION INTRAVENOUS at 18:05

## 2020-09-15 RX ADMIN — CLINDAMYCIN HYDROCHLORIDE SCH MG: 300 CAPSULE ORAL at 23:16

## 2020-09-15 RX ADMIN — CLINDAMYCIN HYDROCHLORIDE SCH MG: 300 CAPSULE ORAL at 16:55

## 2020-09-15 RX ADMIN — VANCOMYCIN HYDROCHLORIDE SCH MLS/HR: 1 INJECTION, POWDER, LYOPHILIZED, FOR SOLUTION INTRAVENOUS at 20:10

## 2020-09-15 NOTE — NUR
PT CAME IN CO OF BILAT EAR PAIN. AREA AROUND EARS IS RED, SWOLLEN, AND PAINFUL. 
PT REPORTS DRAINAGE  COMING FROM BOTH EARS "MORE ON THE RIGHT". PT WAS SEEN ON 
SEPT 13 HERE FOR SAME AND SENT HOME WITH ABX. PT REPORTS "IT ISNT GETTING 
BETTER". PT ACCOMPANIED BY [facility]. CONNECTED TO MONITORING EQUIPMENT.

## 2020-09-15 NOTE — NUR
piv placed from which 1 set of blood culture/basic labs drawn and sent to lab

Lab phlebotomist at bedside for lactate/2nd set of cultures

## 2020-09-16 VITALS — SYSTOLIC BLOOD PRESSURE: 117 MMHG | DIASTOLIC BLOOD PRESSURE: 77 MMHG

## 2020-09-16 VITALS — SYSTOLIC BLOOD PRESSURE: 104 MMHG | DIASTOLIC BLOOD PRESSURE: 68 MMHG

## 2020-09-16 LAB
ALBUMIN SERPL-MCNC: 2.6 G/DL (ref 3.4–5)
ALP SERPL-CCNC: 97 U/L (ref 45–117)
ALT SERPL-CCNC: 20 U/L (ref 12–78)
ANION GAP SERPL CALC-SCNC: 6 MMOL/L (ref 5–15)
BASOPHILS # BLD AUTO: 0.03 X10^3/UL (ref 0–0.1)
BASOPHILS NFR BLD AUTO: 1 % (ref 0–1)
BILIRUB SERPL-MCNC: 0.4 MG/DL (ref 0.2–1)
CALCIUM SERPL-MCNC: 8.6 MG/DL (ref 8.5–10.1)
CHLORIDE SERPL-SCNC: 108 MMOL/L (ref 98–107)
CREAT SERPL-MCNC: 0.76 MG/DL (ref 0.55–1.02)
EOSINOPHIL # BLD AUTO: 0.28 X10^3/UL (ref 0–0.4)
EOSINOPHIL NFR BLD AUTO: 5 % (ref 1–7)
ERYTHROCYTE [DISTWIDTH] IN BLOOD BY AUTOMATED COUNT: 14.8 % (ref 9.6–15.2)
LYMPHOCYTES # BLD AUTO: 2.25 X10^3/UL (ref 1–3.4)
LYMPHOCYTES NFR BLD AUTO: 36 % (ref 22–44)
MCH RBC QN AUTO: 27.4 PG (ref 27–34.8)
MCHC RBC AUTO-ENTMCNC: 33 G/DL (ref 32.4–35.8)
MCV RBC AUTO: 83 FL (ref 80–100)
MD: NO
MICROSCOPIC: (no result)
MONOCYTES # BLD AUTO: 0.3 X10^3/UL (ref 0.2–0.8)
MONOCYTES NFR BLD AUTO: 5 % (ref 2–9)
NEUTROPHILS # BLD AUTO: 3.45 X10^3/UL (ref 1.8–6.8)
NEUTROPHILS NFR BLD AUTO: 55 % (ref 42–75)
PLATELET # BLD AUTO: 273 X10^3/UL (ref 130–400)
PMV BLD AUTO: 7 FL (ref 7.4–10.4)
PROT SERPL-MCNC: 6.3 G/DL (ref 6.4–8.2)
RBC # BLD AUTO: 3.84 X10^6/UL (ref 3.82–5.3)

## 2020-09-16 RX ADMIN — CLINDAMYCIN HYDROCHLORIDE SCH MG: 300 CAPSULE ORAL at 05:15

## 2020-09-16 RX ADMIN — MORPHINE SULFATE PRN MG: 10 INJECTION INTRAVENOUS at 03:23

## 2020-09-16 RX ADMIN — ONDANSETRON PRN MG: 2 INJECTION, SOLUTION INTRAMUSCULAR; INTRAVENOUS at 09:23

## 2020-09-16 RX ADMIN — MORPHINE SULFATE PRN MG: 10 INJECTION INTRAVENOUS at 08:47

## 2020-09-16 RX ADMIN — LACTOBACILLUS ACIDOPHILUS / LACTOBACILLUS BULGARICUS SCH GM: 100 MILLION CFU STRENGTH GRANULES at 07:17

## 2020-09-16 RX ADMIN — VANCOMYCIN HYDROCHLORIDE SCH MLS/HR: 1 INJECTION, POWDER, LYOPHILIZED, FOR SOLUTION INTRAVENOUS at 03:15

## 2020-09-16 RX ADMIN — ONDANSETRON PRN MG: 2 INJECTION, SOLUTION INTRAMUSCULAR; INTRAVENOUS at 03:23

## 2020-09-16 RX ADMIN — CLINDAMYCIN HYDROCHLORIDE SCH MG: 300 CAPSULE ORAL at 10:24

## 2020-09-16 RX ADMIN — SODIUM CHLORIDE, SODIUM LACTATE, POTASSIUM CHLORIDE, AND CALCIUM CHLORIDE SCH MLS/HR: .6; .31; .03; .02 INJECTION, SOLUTION INTRAVENOUS at 03:14

## 2020-09-16 RX ADMIN — FAMOTIDINE SCH MG: 20 TABLET, FILM COATED ORAL at 09:00

## 2020-09-27 ENCOUNTER — HOSPITAL ENCOUNTER (EMERGENCY)
Dept: HOSPITAL 8 - ED | Age: 45
Discharge: HOME | End: 2020-09-27
Payer: COMMERCIAL

## 2020-09-27 VITALS — BODY MASS INDEX: 22.89 KG/M2 | WEIGHT: 151.02 LBS | HEIGHT: 68 IN

## 2020-09-27 VITALS — DIASTOLIC BLOOD PRESSURE: 84 MMHG | SYSTOLIC BLOOD PRESSURE: 114 MMHG

## 2020-09-27 DIAGNOSIS — R51: ICD-10-CM

## 2020-09-27 DIAGNOSIS — Z90.49: ICD-10-CM

## 2020-09-27 DIAGNOSIS — K50.90: ICD-10-CM

## 2020-09-27 DIAGNOSIS — R68.84: Primary | ICD-10-CM

## 2020-09-27 DIAGNOSIS — F17.200: ICD-10-CM

## 2020-09-27 PROCEDURE — 99283 EMERGENCY DEPT VISIT LOW MDM: CPT

## 2020-09-29 ENCOUNTER — HOSPITAL ENCOUNTER (EMERGENCY)
Facility: MEDICAL CENTER | Age: 45
End: 2020-09-29
Attending: EMERGENCY MEDICINE

## 2020-09-29 VITALS
RESPIRATION RATE: 17 BRPM | WEIGHT: 156.97 LBS | BODY MASS INDEX: 23.79 KG/M2 | DIASTOLIC BLOOD PRESSURE: 83 MMHG | OXYGEN SATURATION: 98 % | TEMPERATURE: 97.1 F | SYSTOLIC BLOOD PRESSURE: 110 MMHG | HEIGHT: 68 IN | HEART RATE: 76 BPM

## 2020-09-29 DIAGNOSIS — H66.004 RECURRENT ACUTE SUPPURATIVE OTITIS MEDIA OF RIGHT EAR WITHOUT SPONTANEOUS RUPTURE OF TYMPANIC MEMBRANE: ICD-10-CM

## 2020-09-29 DIAGNOSIS — L03.211 FACIAL CELLULITIS: ICD-10-CM

## 2020-09-29 LAB
ANION GAP SERPL CALC-SCNC: 15 MMOL/L (ref 7–16)
BASOPHILS # BLD AUTO: 0.6 % (ref 0–1.8)
BASOPHILS # BLD: 0.05 K/UL (ref 0–0.12)
BUN SERPL-MCNC: 10 MG/DL (ref 8–22)
CALCIUM SERPL-MCNC: 9.5 MG/DL (ref 8.5–10.5)
CHLORIDE SERPL-SCNC: 105 MMOL/L (ref 96–112)
CO2 SERPL-SCNC: 19 MMOL/L (ref 20–33)
CREAT SERPL-MCNC: 0.66 MG/DL (ref 0.5–1.4)
EOSINOPHIL # BLD AUTO: 0.33 K/UL (ref 0–0.51)
EOSINOPHIL NFR BLD: 3.6 % (ref 0–6.9)
ERYTHROCYTE [DISTWIDTH] IN BLOOD BY AUTOMATED COUNT: 46.3 FL (ref 35.9–50)
GLUCOSE SERPL-MCNC: 95 MG/DL (ref 65–99)
HCT VFR BLD AUTO: 37.8 % (ref 37–47)
HGB BLD-MCNC: 12.2 G/DL (ref 12–16)
IMM GRANULOCYTES # BLD AUTO: 0.03 K/UL (ref 0–0.11)
IMM GRANULOCYTES NFR BLD AUTO: 0.3 % (ref 0–0.9)
LYMPHOCYTES # BLD AUTO: 2.95 K/UL (ref 1–4.8)
LYMPHOCYTES NFR BLD: 32.6 % (ref 22–41)
MCH RBC QN AUTO: 27.6 PG (ref 27–33)
MCHC RBC AUTO-ENTMCNC: 32.3 G/DL (ref 33.6–35)
MCV RBC AUTO: 85.5 FL (ref 81.4–97.8)
MONOCYTES # BLD AUTO: 0.51 K/UL (ref 0–0.85)
MONOCYTES NFR BLD AUTO: 5.6 % (ref 0–13.4)
NEUTROPHILS # BLD AUTO: 5.19 K/UL (ref 2–7.15)
NEUTROPHILS NFR BLD: 57.3 % (ref 44–72)
NRBC # BLD AUTO: 0 K/UL
NRBC BLD-RTO: 0 /100 WBC
PLATELET # BLD AUTO: 273 K/UL (ref 164–446)
PMV BLD AUTO: 9.2 FL (ref 9–12.9)
POTASSIUM SERPL-SCNC: 4.3 MMOL/L (ref 3.6–5.5)
RBC # BLD AUTO: 4.42 M/UL (ref 4.2–5.4)
SODIUM SERPL-SCNC: 139 MMOL/L (ref 135–145)
WBC # BLD AUTO: 9.1 K/UL (ref 4.8–10.8)

## 2020-09-29 PROCEDURE — 96374 THER/PROPH/DIAG INJ IV PUSH: CPT

## 2020-09-29 PROCEDURE — A9270 NON-COVERED ITEM OR SERVICE: HCPCS | Performed by: EMERGENCY MEDICINE

## 2020-09-29 PROCEDURE — 96375 TX/PRO/DX INJ NEW DRUG ADDON: CPT

## 2020-09-29 PROCEDURE — 99284 EMERGENCY DEPT VISIT MOD MDM: CPT

## 2020-09-29 PROCEDURE — 700111 HCHG RX REV CODE 636 W/ 250 OVERRIDE (IP): Performed by: EMERGENCY MEDICINE

## 2020-09-29 PROCEDURE — 700102 HCHG RX REV CODE 250 W/ 637 OVERRIDE(OP): Performed by: EMERGENCY MEDICINE

## 2020-09-29 PROCEDURE — 36415 COLL VENOUS BLD VENIPUNCTURE: CPT

## 2020-09-29 PROCEDURE — 80048 BASIC METABOLIC PNL TOTAL CA: CPT

## 2020-09-29 PROCEDURE — 85025 COMPLETE CBC W/AUTO DIFF WBC: CPT

## 2020-09-29 RX ORDER — LEVOFLOXACIN 500 MG/1
500 TABLET, FILM COATED ORAL DAILY
Qty: 10 TAB | Refills: 0 | Status: ON HOLD | OUTPATIENT
Start: 2020-09-29 | End: 2020-10-04

## 2020-09-29 RX ORDER — LEVOFLOXACIN 500 MG/1
500 TABLET, FILM COATED ORAL ONCE
Status: COMPLETED | OUTPATIENT
Start: 2020-09-29 | End: 2020-09-29

## 2020-09-29 RX ORDER — ONDANSETRON 2 MG/ML
4 INJECTION INTRAMUSCULAR; INTRAVENOUS
Status: DISCONTINUED | OUTPATIENT
Start: 2020-09-29 | End: 2020-09-29 | Stop reason: HOSPADM

## 2020-09-29 RX ORDER — MORPHINE SULFATE 4 MG/ML
4 INJECTION, SOLUTION INTRAMUSCULAR; INTRAVENOUS ONCE
Status: COMPLETED | OUTPATIENT
Start: 2020-09-29 | End: 2020-09-29

## 2020-09-29 RX ADMIN — LEVOFLOXACIN 500 MG: 500 TABLET, FILM COATED ORAL at 20:54

## 2020-09-29 RX ADMIN — ONDANSETRON 4 MG: 2 INJECTION INTRAMUSCULAR; INTRAVENOUS at 17:53

## 2020-09-29 RX ADMIN — MORPHINE SULFATE 4 MG: 4 INJECTION INTRAVENOUS at 17:53

## 2020-09-29 ASSESSMENT — PAIN DESCRIPTION - PAIN TYPE: TYPE: ACUTE PAIN

## 2020-09-29 ASSESSMENT — FIBROSIS 4 INDEX: FIB4 SCORE: 0.52

## 2020-09-29 NOTE — ED TRIAGE NOTES
.  Chief Complaint   Patient presents with   • Skin Lesion     right ear/jaw swelling. x4 days area red and swollen    pt was on abx 4 weeks ago for the same. Right mastoiditis with MRSA. Treated with iv and oral ax. reports recurrent infection to area.     Mask applied to patient prior to triage. This RN in ppe prior to encounter. Pt denies recent travel or contact with anyone tested positive for covid 19.

## 2020-09-30 NOTE — ED PROVIDER NOTES
ED Provider Note    CHIEF COMPLAINT  Chief Complaint   Patient presents with   • Skin Lesion     right ear/jaw swelling. x4 days area red and swollen        HPI  Charley Dunn is a 45 y.o. female who presents with 4 days of worsening right cristino-auricular erythema with edema and pain.  Patient reports a history of mastoiditis and was admitted on IV clindamycin and finished oral clindamycin 4 days ago when her symptoms return.  She has had headache.  Denies fever.  She has Crohn's disease and RA but is on no immunosuppressive medications.  She has had frequent otitis media in the past and recurrent mastoiditis.  She has no ENT doctor.  COVID negative when admitted earlier this month.    REVIEW OF SYSTEMS  Pertinent positives include: Right cristino-auricular erythema, edema, pain.  Chronic smoker's cough.  Nausea  Pertinent negatives include: Fever.  Sore throat, chest pain, vomiting, diarrhea  10+ systems reviewed and negative.      PAST MEDICAL HISTORY  Past Medical History:   Diagnosis Date   • Anxiety    • Bradycardia    • Crohn's disease (HCC)    • MRSA (methicillin resistant Staphylococcus aureus)     to R face   • RA (rheumatoid arthritis) ([facility])        SOCIAL HISTORY  Social History     Tobacco Use   • Smoking status: Current Every Day Smoker     Packs/day: 0.50     Years: 32.00     Pack years: 16.00     Types: Cigarettes   • Smokeless tobacco: Never Used   Substance Use Topics   • Alcohol use: Not Currently     Comment: rare   • Drug use: Never     Social History     Substance and Sexual Activity   Drug Use Never       SURGICAL HISTORY  Past Surgical History:   Procedure Laterality Date   • OTHER     • OTHER ABDOMINAL SURGERY         CURRENT MEDICATIONS  No current facility-administered medications for this encounter.      Current Outpatient Medications   Medication Sig Dispense Refill   • quetiapine (SEROQUEL) 50 MG tablet Take 1 Tab by mouth every bedtime. 30 Tab 1       ALLERGIES  Allergies   Allergen  "Reactions   • Pcn [Penicillins] Anaphylaxis   • Toradol Hives     Migraine     • Tramadol Hives     migraine     • Bactrim [Sulfamethoxazole W-Trimethoprim] Itching     Face itching     • Stadol [Butorphanol] Unspecified     Pt reports it makes her feel like she can't wake up   • Blackberry [Rubus Fruticosus]      Oral swelling   • Fish Swelling   • Tree Nuts Food Allergy        PHYSICAL EXAM  VITAL SIGNS: /85   Pulse (!) 114   Temp 36.5 °C (97.7 °F) (Temporal)   Resp 16   Ht 1.727 m (5' 8\")   Wt 71.2 kg (156 lb 15.5 oz)   SpO2 100%   BMI 23.87 kg/m²   Reviewed and tachycardic, afebrile  Constitutional: Well developed, Well nourished, well-appearing.  HENT: Normocephalic, atraumatic, redness and tenderness anterior and inferior to the right auricle.  Mastoid tenderness without erythema.  No discharge from the ear.  Tympanic membranes bulging with apparent pus behind the TM.  Tender right lateral neck without significant lymph nodes, no intraoral erythema or edema.  No erythema or edema of the pinna.  External canal is normal.  Eyes: PERRLA, conjunctiva pink, no scleral icterus.   Cardiovascular: Regular S1-S2 without murmur, rub, gallop.  No dependent edema or calf tenderness.  Respiratory: No rales, rhonchi, wheeze or cough.  Gastrointestinal: Soft, nontender, nondistended, no organomegaly.  Skin: No erythema, no rash.   Genitourinary:  No costovertebral angle tenderness.   Neurologic: Alert & oriented x 3, cranial nerves 2-12 intact by passive exam.  No focal deficit noted.  Psychiatric: Affect normal, Judgment normal, Mood normal.     DIFFERENTIAL DIAGNOSIS:  Otitis media, mastoiditis, facial cellulitis.      RADIOLOGY/PROCEDURES  CT head from earlier this month reviewed and not consistent with otitis media or mastoiditis on the right.    LABORATORY:  Results for orders placed or performed during the hospital encounter of 09/29/20   CBC WITH DIFFERENTIAL   Result Value Ref Range    WBC 9.1 4.8 - 10.8 " K/uL    RBC 4.42 4.20 - 5.40 M/uL    Hemoglobin 12.2 12.0 - 16.0 g/dL    Hematocrit 37.8 37.0 - 47.0 %    MCV 85.5 81.4 - 97.8 fL    MCH 27.6 27.0 - 33.0 pg    MCHC 32.3 (L) 33.6 - 35.0 g/dL    RDW 46.3 35.9 - 50.0 fL    Platelet Count 273 164 - 446 K/uL    MPV 9.2 9.0 - 12.9 fL    Neutrophils-Polys 57.30 44.00 - 72.00 %    Lymphocytes 32.60 22.00 - 41.00 %    Monocytes 5.60 0.00 - 13.40 %    Eosinophils 3.60 0.00 - 6.90 %    Basophils 0.60 0.00 - 1.80 %    Immature Granulocytes 0.30 0.00 - 0.90 %    Nucleated RBC 0.00 /100 WBC    Neutrophils (Absolute) 5.19 2.00 - 7.15 K/uL    Lymphs (Absolute) 2.95 1.00 - 4.80 K/uL    Monos (Absolute) 0.51 0.00 - 0.85 K/uL    Eos (Absolute) 0.33 0.00 - 0.51 K/uL    Baso (Absolute) 0.05 0.00 - 0.12 K/uL    Immature Granulocytes (abs) 0.03 0.00 - 0.11 K/uL    NRBC (Absolute) 0.00 K/uL   Basic Metabolic Panel   Result Value Ref Range    Sodium 139 135 - 145 mmol/L    Potassium 4.3 3.6 - 5.5 mmol/L    Chloride 105 96 - 112 mmol/L    Co2 19 (L) 20 - 33 mmol/L    Glucose 95 65 - 99 mg/dL    Bun 10 8 - 22 mg/dL    Creatinine 0.66 0.50 - 1.40 mg/dL    Calcium 9.5 8.5 - 10.5 mg/dL    Anion Gap 15.0 7.0 - 16.0       INTERVENTIONS:  Medications   ondansetron (ZOFRAN) syringe/vial injection 4 mg (4 mg Intravenous Given 9/29/20 1753)   levoFLOXacin (LEVAQUIN) tablet 500 mg (has no administration in time range)   morphine (pf) 4 mg/mL injection 4 mg (4 mg Intravenous Given 9/29/20 1753)     Response: Improved pain.    COURSE & MEDICAL DECISION MAKING  This discussed with Dr. Sanabria ENT who will see the patient in follow-up.  He recommended Levaquin since patient is penicillin allergic.    Patient presents with otitis media and a facial cellulitis without evidence of involvement of the pinna or the external canal.  Mastoid may be involved.  Otitis media appears superlative.  There is no evidence of bacteremia or sepsis.  There is no immune compromise.    This patient has borderline or elevated  blood pressure as recorded above and was instructed to followup with primary physician for comprehensive blood pressure evaluation and yearly fasting cholesterol assessment according to to CMS protocol.    PLAN:  New Prescriptions    LEVOFLOXACIN (LEVAQUIN) 500 MG TABLET    Take 1 Tab by mouth every day.       Ibuprofen and Tylenol for pain  Facial infection handout given  Return for persistent high fever, uncontrolled vomiting, dizziness, spreading redness, ill appearance    Schuyler Sanabria M.D.  [address] [facility] [address]  [phone]    Schedule an appointment as soon as possible for a visit   for followup in 2-3 days      CONDITION: Stable.    FINAL IMPRESSION  1. Recurrent acute suppurative otitis media of right ear without spontaneous rupture of tympanic membrane    2. Facial cellulitis          Electronically signed by: Sami Kasper M.D., 9/29/2020 5:38 PM

## 2020-09-30 NOTE — ED NOTES
Discharge teaching and paperwork provided. All questions/concerns answered. VSS, assessment stable and PIV removed. Given information regarding prescription. Patient discharged to the care of spouse and ambulated out of the ED with steady gait.

## 2020-09-30 NOTE — ED NOTES
Received report from day shift KIRK Simmons. Assessed patient and updated care board. Plan of care discussed.

## 2020-09-30 NOTE — DISCHARGE INSTRUCTIONS
Start the new antibiotic as prescribed.  Call Dr. Sanabria for follow-up.  Take ibuprofen and Tylenol for pain.  Return for spreading redness of the face, dizziness, vomiting, ill appearance.    You had a borderline or high normal blood pressure reading today.  This does not necessarily mean you have hypertension.  Please followup with your/a primary physician for comprehensive blood pressure evaluation and yearly fasting cholesterol assessment.  BP Readings from Last 3 Encounters:   09/29/20 140/85   09/13/20 131/95   09/04/20 112/76

## 2020-10-01 ENCOUNTER — HOSPITAL ENCOUNTER (OUTPATIENT)
Facility: MEDICAL CENTER | Age: 45
LOS: 1 days | End: 2020-10-04
Attending: EMERGENCY MEDICINE | Admitting: HOSPITALIST

## 2020-10-01 DIAGNOSIS — L03.211 FACIAL CELLULITIS: ICD-10-CM

## 2020-10-01 PROBLEM — R00.0 TACHYCARDIA: Status: ACTIVE | Noted: 2020-10-01

## 2020-10-01 PROBLEM — F17.200 TOBACCO DEPENDENCE: Status: ACTIVE | Noted: 2020-10-01

## 2020-10-01 LAB
ALBUMIN SERPL BCP-MCNC: 3.9 G/DL (ref 3.2–4.9)
ALBUMIN/GLOB SERPL: 1.1 G/DL
ALP SERPL-CCNC: 127 U/L (ref 30–99)
ALT SERPL-CCNC: 19 U/L (ref 2–50)
ANION GAP SERPL CALC-SCNC: 14 MMOL/L (ref 7–16)
AST SERPL-CCNC: 18 U/L (ref 12–45)
BASOPHILS # BLD AUTO: 0.5 % (ref 0–1.8)
BASOPHILS # BLD: 0.05 K/UL (ref 0–0.12)
BILIRUB SERPL-MCNC: 0.4 MG/DL (ref 0.1–1.5)
BUN SERPL-MCNC: 8 MG/DL (ref 8–22)
CALCIUM SERPL-MCNC: 9.7 MG/DL (ref 8.4–10.2)
CHLORIDE SERPL-SCNC: 104 MMOL/L (ref 96–112)
CO2 SERPL-SCNC: 24 MMOL/L (ref 20–33)
COVID ORDER STATUS COVID19: NORMAL
CREAT SERPL-MCNC: 0.74 MG/DL (ref 0.5–1.4)
CRP SERPL HS-MCNC: 0.82 MG/DL (ref 0–0.75)
EOSINOPHIL # BLD AUTO: 0.29 K/UL (ref 0–0.51)
EOSINOPHIL NFR BLD: 3.1 % (ref 0–6.9)
ERYTHROCYTE [DISTWIDTH] IN BLOOD BY AUTOMATED COUNT: 44.6 FL (ref 35.9–50)
ERYTHROCYTE [SEDIMENTATION RATE] IN BLOOD BY WESTERGREN METHOD: 30 MM/HOUR (ref 0–20)
GLOBULIN SER CALC-MCNC: 3.6 G/DL (ref 1.9–3.5)
GLUCOSE SERPL-MCNC: 86 MG/DL (ref 65–99)
HCG SERPL QL: NEGATIVE
HCT VFR BLD AUTO: 34.9 % (ref 37–47)
HGB BLD-MCNC: 11.3 G/DL (ref 12–16)
IMM GRANULOCYTES # BLD AUTO: 0.04 K/UL (ref 0–0.11)
IMM GRANULOCYTES NFR BLD AUTO: 0.4 % (ref 0–0.9)
LACTATE BLD-SCNC: 1.5 MMOL/L (ref 0.5–2)
LACTATE BLD-SCNC: 1.8 MMOL/L (ref 0.5–2)
LYMPHOCYTES # BLD AUTO: 3.09 K/UL (ref 1–4.8)
LYMPHOCYTES NFR BLD: 33.3 % (ref 22–41)
MCH RBC QN AUTO: 27.1 PG (ref 27–33)
MCHC RBC AUTO-ENTMCNC: 32.4 G/DL (ref 33.6–35)
MCV RBC AUTO: 83.7 FL (ref 81.4–97.8)
MONOCYTES # BLD AUTO: 0.49 K/UL (ref 0–0.85)
MONOCYTES NFR BLD AUTO: 5.3 % (ref 0–13.4)
NEUTROPHILS # BLD AUTO: 5.32 K/UL (ref 2–7.15)
NEUTROPHILS NFR BLD: 57.4 % (ref 44–72)
NRBC # BLD AUTO: 0 K/UL
NRBC BLD-RTO: 0 /100 WBC
PLATELET # BLD AUTO: 284 K/UL (ref 164–446)
PMV BLD AUTO: 9.2 FL (ref 9–12.9)
POTASSIUM SERPL-SCNC: 3.5 MMOL/L (ref 3.6–5.5)
PROCALCITONIN SERPL-MCNC: 0.03 NG/ML
PROT SERPL-MCNC: 7.5 G/DL (ref 6–8.2)
RBC # BLD AUTO: 4.17 M/UL (ref 4.2–5.4)
SODIUM SERPL-SCNC: 142 MMOL/L (ref 135–145)
WBC # BLD AUTO: 9.3 K/UL (ref 4.8–10.8)

## 2020-10-01 PROCEDURE — 700101 HCHG RX REV CODE 250: Performed by: HOSPITALIST

## 2020-10-01 PROCEDURE — 85652 RBC SED RATE AUTOMATED: CPT

## 2020-10-01 PROCEDURE — 80053 COMPREHEN METABOLIC PANEL: CPT

## 2020-10-01 PROCEDURE — C9803 HOPD COVID-19 SPEC COLLECT: HCPCS | Performed by: EMERGENCY MEDICINE

## 2020-10-01 PROCEDURE — 86140 C-REACTIVE PROTEIN: CPT

## 2020-10-01 PROCEDURE — 85025 COMPLETE CBC W/AUTO DIFF WBC: CPT

## 2020-10-01 PROCEDURE — A9270 NON-COVERED ITEM OR SERVICE: HCPCS | Performed by: HOSPITALIST

## 2020-10-01 PROCEDURE — 96375 TX/PRO/DX INJ NEW DRUG ADDON: CPT

## 2020-10-01 PROCEDURE — 84145 PROCALCITONIN (PCT): CPT

## 2020-10-01 PROCEDURE — 99218 PR INITIAL OBSERVATION CARE,LEVL I: CPT | Mod: 25 | Performed by: HOSPITALIST

## 2020-10-01 PROCEDURE — G0378 HOSPITAL OBSERVATION PER HR: HCPCS

## 2020-10-01 PROCEDURE — 99406 BEHAV CHNG SMOKING 3-10 MIN: CPT | Performed by: HOSPITALIST

## 2020-10-01 PROCEDURE — 36415 COLL VENOUS BLD VENIPUNCTURE: CPT

## 2020-10-01 PROCEDURE — 700101 HCHG RX REV CODE 250: Performed by: EMERGENCY MEDICINE

## 2020-10-01 PROCEDURE — 700111 HCHG RX REV CODE 636 W/ 250 OVERRIDE (IP): Performed by: HOSPITALIST

## 2020-10-01 PROCEDURE — 83605 ASSAY OF LACTIC ACID: CPT | Mod: 91

## 2020-10-01 PROCEDURE — 96376 TX/PRO/DX INJ SAME DRUG ADON: CPT

## 2020-10-01 PROCEDURE — 96365 THER/PROPH/DIAG IV INF INIT: CPT

## 2020-10-01 PROCEDURE — 700111 HCHG RX REV CODE 636 W/ 250 OVERRIDE (IP): Performed by: EMERGENCY MEDICINE

## 2020-10-01 PROCEDURE — 700102 HCHG RX REV CODE 250 W/ 637 OVERRIDE(OP): Performed by: HOSPITALIST

## 2020-10-01 PROCEDURE — 87040 BLOOD CULTURE FOR BACTERIA: CPT

## 2020-10-01 PROCEDURE — U0003 INFECTIOUS AGENT DETECTION BY NUCLEIC ACID (DNA OR RNA); SEVERE ACUTE RESPIRATORY SYNDROME CORONAVIRUS 2 (SARS-COV-2) (CORONAVIRUS DISEASE [COVID-19]), AMPLIFIED PROBE TECHNIQUE, MAKING USE OF HIGH THROUGHPUT TECHNOLOGIES AS DESCRIBED BY CMS-2020-01-R: HCPCS

## 2020-10-01 PROCEDURE — 99285 EMERGENCY DEPT VISIT HI MDM: CPT

## 2020-10-01 PROCEDURE — 84703 CHORIONIC GONADOTROPIN ASSAY: CPT

## 2020-10-01 RX ORDER — ACETAMINOPHEN 325 MG/1
650 TABLET ORAL EVERY 6 HOURS PRN
Status: DISCONTINUED | OUTPATIENT
Start: 2020-10-01 | End: 2020-10-04 | Stop reason: HOSPADM

## 2020-10-01 RX ORDER — SODIUM CHLORIDE, SODIUM LACTATE, POTASSIUM CHLORIDE, AND CALCIUM CHLORIDE .6; .31; .03; .02 G/100ML; G/100ML; G/100ML; G/100ML
30 INJECTION, SOLUTION INTRAVENOUS
Status: DISCONTINUED | OUTPATIENT
Start: 2020-10-01 | End: 2020-10-04 | Stop reason: HOSPADM

## 2020-10-01 RX ORDER — AMOXICILLIN 250 MG
2 CAPSULE ORAL 2 TIMES DAILY
Status: DISCONTINUED | OUTPATIENT
Start: 2020-10-01 | End: 2020-10-04 | Stop reason: HOSPADM

## 2020-10-01 RX ORDER — HYDROMORPHONE HYDROCHLORIDE 1 MG/ML
1 INJECTION, SOLUTION INTRAMUSCULAR; INTRAVENOUS; SUBCUTANEOUS ONCE
Status: COMPLETED | OUTPATIENT
Start: 2020-10-01 | End: 2020-10-01

## 2020-10-01 RX ORDER — SODIUM CHLORIDE AND POTASSIUM CHLORIDE 150; 900 MG/100ML; MG/100ML
INJECTION, SOLUTION INTRAVENOUS CONTINUOUS
Status: DISCONTINUED | OUTPATIENT
Start: 2020-10-01 | End: 2020-10-04 | Stop reason: HOSPADM

## 2020-10-01 RX ORDER — NICOTINE 21 MG/24HR
21 PATCH, TRANSDERMAL 24 HOURS TRANSDERMAL
Status: DISCONTINUED | OUTPATIENT
Start: 2020-10-02 | End: 2020-10-04 | Stop reason: HOSPADM

## 2020-10-01 RX ORDER — POLYETHYLENE GLYCOL 3350 17 G/17G
1 POWDER, FOR SOLUTION ORAL
Status: DISCONTINUED | OUTPATIENT
Start: 2020-10-01 | End: 2020-10-04 | Stop reason: HOSPADM

## 2020-10-01 RX ORDER — SODIUM CHLORIDE 9 MG/ML
INJECTION, SOLUTION INTRAVENOUS CONTINUOUS
Status: DISCONTINUED | OUTPATIENT
Start: 2020-10-01 | End: 2020-10-01

## 2020-10-01 RX ORDER — QUETIAPINE FUMARATE 25 MG/1
50 TABLET, FILM COATED ORAL
Status: DISCONTINUED | OUTPATIENT
Start: 2020-10-01 | End: 2020-10-04 | Stop reason: HOSPADM

## 2020-10-01 RX ORDER — CLINDAMYCIN PHOSPHATE 600 MG/50ML
600 INJECTION, SOLUTION INTRAVENOUS ONCE
Status: COMPLETED | OUTPATIENT
Start: 2020-10-01 | End: 2020-10-01

## 2020-10-01 RX ORDER — BISACODYL 10 MG
10 SUPPOSITORY, RECTAL RECTAL
Status: DISCONTINUED | OUTPATIENT
Start: 2020-10-01 | End: 2020-10-04 | Stop reason: HOSPADM

## 2020-10-01 RX ORDER — CLINDAMYCIN PHOSPHATE 600 MG/50ML
600 INJECTION, SOLUTION INTRAVENOUS EVERY 8 HOURS
Status: DISCONTINUED | OUTPATIENT
Start: 2020-10-01 | End: 2020-10-04 | Stop reason: HOSPADM

## 2020-10-01 RX ORDER — HYDROMORPHONE HYDROCHLORIDE 1 MG/ML
0.25 INJECTION, SOLUTION INTRAMUSCULAR; INTRAVENOUS; SUBCUTANEOUS
Status: DISCONTINUED | OUTPATIENT
Start: 2020-10-01 | End: 2020-10-03

## 2020-10-01 RX ORDER — HYDROMORPHONE HYDROCHLORIDE 2 MG/1
2 TABLET ORAL
Status: DISCONTINUED | OUTPATIENT
Start: 2020-10-01 | End: 2020-10-03

## 2020-10-01 RX ORDER — ONDANSETRON 2 MG/ML
4 INJECTION INTRAMUSCULAR; INTRAVENOUS ONCE
Status: COMPLETED | OUTPATIENT
Start: 2020-10-01 | End: 2020-10-01

## 2020-10-01 RX ADMIN — ONDANSETRON HYDROCHLORIDE 4 MG: 2 SOLUTION INTRAMUSCULAR; INTRAVENOUS at 19:25

## 2020-10-01 RX ADMIN — HYDROMORPHONE HYDROCHLORIDE 0.25 MG: 1 INJECTION, SOLUTION INTRAMUSCULAR; INTRAVENOUS; SUBCUTANEOUS at 22:37

## 2020-10-01 RX ADMIN — HYDROMORPHONE HYDROCHLORIDE 1 MG: 1 INJECTION, SOLUTION INTRAMUSCULAR; INTRAVENOUS; SUBCUTANEOUS at 19:25

## 2020-10-01 RX ADMIN — QUETIAPINE FUMARATE 50 MG: 25 TABLET ORAL at 21:51

## 2020-10-01 RX ADMIN — CLINDAMYCIN IN 5 PERCENT DEXTROSE 600 MG: 12 INJECTION, SOLUTION INTRAVENOUS at 19:25

## 2020-10-01 RX ADMIN — POTASSIUM CHLORIDE AND SODIUM CHLORIDE: 900; 150 INJECTION, SOLUTION INTRAVENOUS at 22:18

## 2020-10-01 ASSESSMENT — ENCOUNTER SYMPTOMS
EYE REDNESS: 0
MYALGIAS: 0
ABDOMINAL PAIN: 0
FLANK PAIN: 0
CHILLS: 1
SORE THROAT: 0
PALPITATIONS: 0
EYE PAIN: 0
FEVER: 0
SEIZURES: 0
BLOOD IN STOOL: 0
EYE DISCHARGE: 0
SHORTNESS OF BREATH: 0
HALLUCINATIONS: 0
HEMOPTYSIS: 0
LOSS OF CONSCIOUSNESS: 0
COUGH: 0
SPEECH CHANGE: 0
DIARRHEA: 0
FOCAL WEAKNESS: 0
BRUISES/BLEEDS EASILY: 0
STRIDOR: 0
SPUTUM PRODUCTION: 0
NERVOUS/ANXIOUS: 0
VOMITING: 0

## 2020-10-01 ASSESSMENT — COGNITIVE AND FUNCTIONAL STATUS - GENERAL
SUGGESTED CMS G CODE MODIFIER MOBILITY: CH
DAILY ACTIVITIY SCORE: 24
MOBILITY SCORE: 24
SUGGESTED CMS G CODE MODIFIER DAILY ACTIVITY: CH

## 2020-10-01 ASSESSMENT — LIFESTYLE VARIABLES
ALCOHOL_USE: NO
ON A TYPICAL DAY WHEN YOU DRINK ALCOHOL HOW MANY DRINKS DO YOU HAVE: 0
TOTAL SCORE: 0
CONSUMPTION TOTAL: NEGATIVE
HOW MANY TIMES IN THE PAST YEAR HAVE YOU HAD 5 OR MORE DRINKS IN A DAY: 0
AVERAGE NUMBER OF DAYS PER WEEK YOU HAVE A DRINK CONTAINING ALCOHOL: 0
HAVE PEOPLE ANNOYED YOU BY CRITICIZING YOUR DRINKING: NO
TOTAL SCORE: 0
TOTAL SCORE: 0
EVER HAD A DRINK FIRST THING IN THE MORNING TO STEADY YOUR NERVES TO GET RID OF A HANGOVER: NO
HAVE YOU EVER FELT YOU SHOULD CUT DOWN ON YOUR DRINKING: NO
EVER FELT BAD OR GUILTY ABOUT YOUR DRINKING: NO

## 2020-10-01 ASSESSMENT — PAIN DESCRIPTION - DESCRIPTORS: DESCRIPTORS: ACHING

## 2020-10-01 ASSESSMENT — PATIENT HEALTH QUESTIONNAIRE - PHQ9
1. LITTLE INTEREST OR PLEASURE IN DOING THINGS: NOT AT ALL
SUM OF ALL RESPONSES TO PHQ9 QUESTIONS 1 AND 2: 0
2. FEELING DOWN, DEPRESSED, IRRITABLE, OR HOPELESS: NOT AT ALL

## 2020-10-01 ASSESSMENT — PAIN DESCRIPTION - PAIN TYPE
TYPE: ACUTE PAIN
TYPE: ACUTE PAIN

## 2020-10-01 ASSESSMENT — FIBROSIS 4 INDEX: FIB4 SCORE: 0.53

## 2020-10-02 ENCOUNTER — APPOINTMENT (OUTPATIENT)
Dept: RADIOLOGY | Facility: MEDICAL CENTER | Age: 45
End: 2020-10-02
Attending: HOSPITALIST

## 2020-10-02 LAB
ALBUMIN SERPL BCP-MCNC: 3.3 G/DL (ref 3.2–4.9)
ALBUMIN/GLOB SERPL: 1.1 G/DL
ALP SERPL-CCNC: 105 U/L (ref 30–99)
ALT SERPL-CCNC: 14 U/L (ref 2–50)
ANION GAP SERPL CALC-SCNC: 11 MMOL/L (ref 7–16)
AST SERPL-CCNC: 14 U/L (ref 12–45)
BASOPHILS # BLD AUTO: 0.6 % (ref 0–1.8)
BASOPHILS # BLD: 0.04 K/UL (ref 0–0.12)
BILIRUB SERPL-MCNC: 0.3 MG/DL (ref 0.1–1.5)
BUN SERPL-MCNC: 8 MG/DL (ref 8–22)
CALCIUM SERPL-MCNC: 8.6 MG/DL (ref 8.4–10.2)
CHLORIDE SERPL-SCNC: 108 MMOL/L (ref 96–112)
CO2 SERPL-SCNC: 23 MMOL/L (ref 20–33)
CREAT SERPL-MCNC: 0.79 MG/DL (ref 0.5–1.4)
EOSINOPHIL # BLD AUTO: 0.29 K/UL (ref 0–0.51)
EOSINOPHIL NFR BLD: 4.1 % (ref 0–6.9)
ERYTHROCYTE [DISTWIDTH] IN BLOOD BY AUTOMATED COUNT: 45.7 FL (ref 35.9–50)
GLOBULIN SER CALC-MCNC: 2.9 G/DL (ref 1.9–3.5)
GLUCOSE SERPL-MCNC: 95 MG/DL (ref 65–99)
HCT VFR BLD AUTO: 32.7 % (ref 37–47)
HGB BLD-MCNC: 10.5 G/DL (ref 12–16)
IMM GRANULOCYTES # BLD AUTO: 0.02 K/UL (ref 0–0.11)
IMM GRANULOCYTES NFR BLD AUTO: 0.3 % (ref 0–0.9)
LACTATE BLD-SCNC: 1.1 MMOL/L (ref 0.5–2)
LYMPHOCYTES # BLD AUTO: 3.11 K/UL (ref 1–4.8)
LYMPHOCYTES NFR BLD: 43.6 % (ref 22–41)
MAGNESIUM SERPL-MCNC: 2.2 MG/DL (ref 1.5–2.5)
MCH RBC QN AUTO: 27.1 PG (ref 27–33)
MCHC RBC AUTO-ENTMCNC: 32.1 G/DL (ref 33.6–35)
MCV RBC AUTO: 84.3 FL (ref 81.4–97.8)
MONOCYTES # BLD AUTO: 0.46 K/UL (ref 0–0.85)
MONOCYTES NFR BLD AUTO: 6.5 % (ref 0–13.4)
NEUTROPHILS # BLD AUTO: 3.21 K/UL (ref 2–7.15)
NEUTROPHILS NFR BLD: 44.9 % (ref 44–72)
NRBC # BLD AUTO: 0 K/UL
NRBC BLD-RTO: 0 /100 WBC
PLATELET # BLD AUTO: 259 K/UL (ref 164–446)
PMV BLD AUTO: 9.2 FL (ref 9–12.9)
POTASSIUM SERPL-SCNC: 4.2 MMOL/L (ref 3.6–5.5)
PROT SERPL-MCNC: 6.2 G/DL (ref 6–8.2)
RBC # BLD AUTO: 3.88 M/UL (ref 4.2–5.4)
SARS-COV-2 RNA RESP QL NAA+PROBE: NOTDETECTED
SODIUM SERPL-SCNC: 142 MMOL/L (ref 135–145)
SPECIMEN SOURCE: NORMAL
WBC # BLD AUTO: 7.1 K/UL (ref 4.8–10.8)

## 2020-10-02 PROCEDURE — 700102 HCHG RX REV CODE 250 W/ 637 OVERRIDE(OP): Performed by: INTERNAL MEDICINE

## 2020-10-02 PROCEDURE — 83605 ASSAY OF LACTIC ACID: CPT

## 2020-10-02 PROCEDURE — 83735 ASSAY OF MAGNESIUM: CPT

## 2020-10-02 PROCEDURE — 99225 PR SUBSEQUENT OBSERVATION CARE,LEVEL II: CPT | Performed by: FAMILY MEDICINE

## 2020-10-02 PROCEDURE — A9270 NON-COVERED ITEM OR SERVICE: HCPCS | Performed by: HOSPITALIST

## 2020-10-02 PROCEDURE — 80053 COMPREHEN METABOLIC PANEL: CPT

## 2020-10-02 PROCEDURE — 700101 HCHG RX REV CODE 250: Performed by: HOSPITALIST

## 2020-10-02 PROCEDURE — 700101 HCHG RX REV CODE 250: Performed by: FAMILY MEDICINE

## 2020-10-02 PROCEDURE — 700111 HCHG RX REV CODE 636 W/ 250 OVERRIDE (IP): Performed by: HOSPITALIST

## 2020-10-02 PROCEDURE — 85025 COMPLETE CBC W/AUTO DIFF WBC: CPT

## 2020-10-02 PROCEDURE — 70487 CT MAXILLOFACIAL W/DYE: CPT

## 2020-10-02 PROCEDURE — 700117 HCHG RX CONTRAST REV CODE 255: Performed by: HOSPITALIST

## 2020-10-02 PROCEDURE — 96366 THER/PROPH/DIAG IV INF ADDON: CPT

## 2020-10-02 PROCEDURE — 96376 TX/PRO/DX INJ SAME DRUG ADON: CPT

## 2020-10-02 PROCEDURE — G0378 HOSPITAL OBSERVATION PER HR: HCPCS

## 2020-10-02 PROCEDURE — A9270 NON-COVERED ITEM OR SERVICE: HCPCS | Performed by: INTERNAL MEDICINE

## 2020-10-02 PROCEDURE — 700102 HCHG RX REV CODE 250 W/ 637 OVERRIDE(OP): Performed by: HOSPITALIST

## 2020-10-02 PROCEDURE — 700101 HCHG RX REV CODE 250

## 2020-10-02 RX ORDER — NEOMYCIN SULFATE, POLYMYXIN B SULFATE AND HYDROCORTISONE 10; 3.5; 1 MG/ML; MG/ML; [USP'U]/ML
4 SUSPENSION/ DROPS AURICULAR (OTIC) 3 TIMES DAILY
Status: DISCONTINUED | OUTPATIENT
Start: 2020-10-02 | End: 2020-10-04 | Stop reason: HOSPADM

## 2020-10-02 RX ORDER — PROMETHAZINE HYDROCHLORIDE 25 MG/1
25 TABLET ORAL EVERY 6 HOURS PRN
Status: DISCONTINUED | OUTPATIENT
Start: 2020-10-02 | End: 2020-10-04 | Stop reason: HOSPADM

## 2020-10-02 RX ORDER — CLINDAMYCIN PHOSPHATE 600 MG/50ML
INJECTION, SOLUTION INTRAVENOUS
Status: COMPLETED
Start: 2020-10-02 | End: 2020-10-02

## 2020-10-02 RX ADMIN — CLINDAMYCIN IN 5 PERCENT DEXTROSE 600 MG: 12 INJECTION, SOLUTION INTRAVENOUS at 12:54

## 2020-10-02 RX ADMIN — POTASSIUM CHLORIDE AND SODIUM CHLORIDE: 900; 150 INJECTION, SOLUTION INTRAVENOUS at 18:41

## 2020-10-02 RX ADMIN — NEOMYCIN SULFATE, POLYMYXIN B SULFATE AND HYDROCORTISONE 4 DROP: 10; 3.5; 1 SUSPENSION/ DROPS AURICULAR (OTIC) at 18:36

## 2020-10-02 RX ADMIN — HYDROMORPHONE HYDROCHLORIDE 0.25 MG: 1 INJECTION, SOLUTION INTRAMUSCULAR; INTRAVENOUS; SUBCUTANEOUS at 19:48

## 2020-10-02 RX ADMIN — CLINDAMYCIN IN 5 PERCENT DEXTROSE 600 MG: 12 INJECTION, SOLUTION INTRAVENOUS at 03:00

## 2020-10-02 RX ADMIN — PROMETHAZINE HYDROCHLORIDE 25 MG: 25 TABLET ORAL at 23:09

## 2020-10-02 RX ADMIN — CLINDAMYCIN IN 5 PERCENT DEXTROSE 600 MG: 12 INJECTION, SOLUTION INTRAVENOUS at 19:48

## 2020-10-02 RX ADMIN — HYDROMORPHONE HYDROCHLORIDE 0.25 MG: 1 INJECTION, SOLUTION INTRAMUSCULAR; INTRAVENOUS; SUBCUTANEOUS at 04:57

## 2020-10-02 RX ADMIN — HYDROMORPHONE HYDROCHLORIDE 0.25 MG: 1 INJECTION, SOLUTION INTRAMUSCULAR; INTRAVENOUS; SUBCUTANEOUS at 12:51

## 2020-10-02 RX ADMIN — HYDROMORPHONE HYDROCHLORIDE 0.25 MG: 1 INJECTION, SOLUTION INTRAMUSCULAR; INTRAVENOUS; SUBCUTANEOUS at 16:16

## 2020-10-02 RX ADMIN — IOHEXOL 75 ML: 350 INJECTION, SOLUTION INTRAVENOUS at 10:03

## 2020-10-02 RX ADMIN — NEOMYCIN SULFATE, POLYMYXIN B SULFATE AND HYDROCORTISONE 4 DROP: 10; 3.5; 1 SUSPENSION/ DROPS AURICULAR (OTIC) at 12:06

## 2020-10-02 RX ADMIN — HYDROMORPHONE HYDROCHLORIDE 2 MG: 2 TABLET ORAL at 23:09

## 2020-10-02 RX ADMIN — HYDROMORPHONE HYDROCHLORIDE 0.25 MG: 1 INJECTION, SOLUTION INTRAMUSCULAR; INTRAVENOUS; SUBCUTANEOUS at 08:18

## 2020-10-02 RX ADMIN — HYDROMORPHONE HYDROCHLORIDE 0.25 MG: 1 INJECTION, SOLUTION INTRAMUSCULAR; INTRAVENOUS; SUBCUTANEOUS at 01:59

## 2020-10-02 RX ADMIN — NICOTINE TRANSDERMAL SYSTEM 21 MG: 21 PATCH, EXTENDED RELEASE TRANSDERMAL at 05:50

## 2020-10-02 RX ADMIN — QUETIAPINE FUMARATE 50 MG: 25 TABLET ORAL at 22:27

## 2020-10-02 ASSESSMENT — ENCOUNTER SYMPTOMS
SHORTNESS OF BREATH: 0
FEVER: 0
CHILLS: 0

## 2020-10-02 ASSESSMENT — PAIN DESCRIPTION - PAIN TYPE
TYPE: ACUTE PAIN

## 2020-10-02 NOTE — H&P
Hospital Medicine History & Physical Note    Date of Service  10/1/2020    Primary Care Physician  No primary care provider on file.    Consultants  None    Code Status  Full Code    Chief Complaint  Chief Complaint   Patient presents with   • Facial Pain     redness   • N/V       History of Presenting Illness  45 y.o. female with a past medical history of anxiety, rheumatoid arthritis, Crohn's disease, tobacco use who presented 10/1/2020 with worsening right-sided facial rash.  Patient has been having recurrent right-sided facial cellulitis she was recently prescribed Levaquin however developed itching and vomiting after few doses.  She reports associated right-sided ear pain that is severe 10 out of 10 no radiation to other places no relieving factors.  She also reports having associated chills but no shortness of breath or coughing.  She is not currently on immunosuppressive medications for her rheumatoid arthritis and Crohn's disease.     Review of Systems  Review of Systems   Constitutional: Positive for chills and malaise/fatigue. Negative for fever.   HENT: Positive for ear pain (Right-sided). Negative for congestion and sore throat.    Eyes: Negative for pain, discharge and redness.   Respiratory: Negative for cough, hemoptysis, sputum production, shortness of breath and stridor.    Cardiovascular: Negative for chest pain, palpitations and leg swelling.   Gastrointestinal: Negative for abdominal pain, blood in stool, diarrhea and vomiting.   Genitourinary: Negative for flank pain, hematuria and urgency.   Musculoskeletal: Negative for myalgias.   Skin: Positive for rash (Worsening right-sided facial rash).   Neurological: Negative for speech change, focal weakness, seizures and loss of consciousness.   Endo/Heme/Allergies: Does not bruise/bleed easily.   Psychiatric/Behavioral: Negative for hallucinations and suicidal ideas. The patient is not nervous/anxious.      Past Medical History   has a past medical  history of Anxiety, Bradycardia, Crohn's disease ([facility]), MRSA (methicillin resistant Staphylococcus aureus), and RA (rheumatoid arthritis) ([facility]).    Surgical History   has a past surgical history that includes other abdominal surgery and other.     Family History  family history is not on file.     Social History   reports that she has been smoking cigarettes. She has a 16.00 pack-year smoking history. She has never used smokeless tobacco. She reports previous alcohol use. She reports that she does not use drugs.    Allergies  Allergies   Allergen Reactions   • Pcn [Penicillins] Anaphylaxis   • Toradol Hives     Migraine     • Tramadol Hives     migraine     • Bactrim [Sulfamethoxazole W-Trimethoprim] Itching     Face itching     • Stadol [Butorphanol] Unspecified     Pt reports it makes her feel like she can't wake up   • Blackberry [Rubus Fruticosus]      Oral swelling   • Fish Swelling   • Levaquin      Vomiting    • Tree Nuts Food Allergy      Medications  Prior to Admission Medications   Prescriptions Last Dose Informant Patient Reported? Taking?   levoFLOXacin (LEVAQUIN) 500 MG tablet   No No   Sig: Take 1 Tab by mouth every day.   levoFLOXacin (LEVAQUIN) 500 MG tablet   No No   Sig: Take 1 Tab by mouth every day.   quetiapine (SEROQUEL) 50 MG tablet   No No   Sig: Take 1 Tab by mouth every bedtime.      Facility-Administered Medications: None     Physical Exam  Temp:  [36.3 °C (97.3 °F)] 36.3 °C (97.3 °F)  Pulse:  [110] 110  Resp:  [16] 16  BP: (129)/(90) 129/90  SpO2:  [97 %] 97 %    Physical Exam  Constitutional:       General: She is not in acute distress.     Appearance: She is not toxic-appearing.   HENT:      Head: Normocephalic and atraumatic.      Comments: Right-sided facial rash, tender right ear     Left Ear: External ear normal.      Nose: No congestion or rhinorrhea.      Mouth/Throat:      Mouth: Mucous membranes are dry.      Pharynx: No oropharyngeal exudate or posterior oropharyngeal erythema.    Eyes:      General: No scleral icterus.        Right eye: No discharge.         Left eye: No discharge.      Conjunctiva/sclera: Conjunctivae normal.      Pupils: Pupils are equal, round, and reactive to light.   Neck:      Musculoskeletal: Neck supple. No neck rigidity or muscular tenderness.   Cardiovascular:      Rate and Rhythm: Tachycardia present.      Heart sounds: No friction rub. No gallop.    Pulmonary:      Breath sounds: No stridor. No wheezing or rhonchi.   Abdominal:      General: There is no distension.      Palpations: Abdomen is soft.      Tenderness: There is no abdominal tenderness. There is no guarding or rebound.   Musculoskeletal:         General: No swelling.      Right lower leg: No edema.      Left lower leg: No edema.   Skin:     General: Skin is dry.      Capillary Refill: Capillary refill takes 2 to 3 seconds.      Coloration: Skin is not jaundiced or pale.      Findings: Erythema and rash present. No bruising.      Comments: Right sided facial rash   Neurological:      Mental Status: She is alert and oriented to person, place, and time.      Cranial Nerves: No cranial nerve deficit.      Sensory: No sensory deficit.      Motor: No weakness.   Psychiatric:         Mood and Affect: Mood normal.         Judgment: Judgment normal.       Laboratory:  Recent Labs     09/29/20  1730 10/01/20  1906   WBC 9.1 9.3   RBC 4.42 4.17*   HEMOGLOBIN 12.2 11.3*   HEMATOCRIT 37.8 34.9*   MCV 85.5 83.7   MCH 27.6 27.1   MCHC 32.3* 32.4*   RDW 46.3 44.6   PLATELETCT 273 284   MPV 9.2 9.2     Recent Labs     09/29/20 1730 10/01/20  1906   SODIUM 139 142   POTASSIUM 4.3 3.5*   CHLORIDE 105 104   CO2 19* 24   GLUCOSE 95 86   BUN 10 8   CREATININE 0.66 0.74   CALCIUM 9.5 9.7     Recent Labs     09/29/20  1730 10/01/20  1906   ALTSGPT  --  19   ASTSGOT  --  18   ALKPHOSPHAT  --  127*   TBILIRUBIN  --  0.4   GLUCOSE 95 86         No results for input(s): NTPROBNP in the last 72 hours.      No results for  input(s): TROPONINT in the last 72 hours.    Imaging:  CT-POST-FOSSA-EAR WITH    (Results Pending)     Assessment/Plan:  I anticipate this patient is appropriate for observation status at this time.    Facial cellulitis  Assessment & Plan  Started on clindamycin in the emergency department, continue for now follow on cultures.   Has right-sided ear pain, will order CT scan to rule out mastoiditis/osteomyelitis    Tobacco dependence  Assessment & Plan  I spent nearly 3.5 minutes on Tobacco cessation education and counseling.   I Discussed the benefits of quitting smoking and risks of continued smoking including cardiovascular disease, cancer and COPD.   Discussed options of nicotine patch, medical treatment with Wellbutrin [Bupropion] and Chantix [Varenicline].       Tachycardia  Assessment & Plan  Likely secondary to infection, pain and dehydration.  Expect to improve with rehydration and pain control    Hypokalemia  Assessment & Plan  Replacing, continue to monitor and replace as needed

## 2020-10-02 NOTE — PROGRESS NOTES
[facility] Medicine Daily Progress Note    Date of Service  10/2/2020    Chief Complaint  45 y.o. female admitted 10/1/2020 with facial cellulitis, possible mastoiditis     Hospital Course  45 y.o. female with a past medical history of anxiety, rheumatoid arthritis, Crohn's disease, tobacco use who presented 10/1/2020 with worsening right-sided facial rash.  Patient has been having recurrent right-sided facial cellulitis she was recently prescribed Levaquin however developed itching and vomiting after few doses.  She reports associated right-sided ear pain that is severe 10 out of 10 no radiation to other places no relieving factors.  She also reports having associated chills but no shortness of breath or coughing.  She is not currently on immunosuppressive medications for her rheumatoid arthritis and Crohn's disease.     Interval Problem Update  10/2 - Pt relates having a 12 year history of chronic intermittent mastoiditis, was scheduled to have mastoid debridement/mastoidectomy per patient but her insurance would not approve it (in [place]). Went to the ER 8/28 and was admitted, but left AMA.  ER visit again 9/2, was discharged with Clindamycin on 9/4. Recurrence of symptoms again on 9/13, was given Levaquin but had vomiting with that, so she self stopped.  Returned 10/1 with facial swelling and ear pain. Awaiting CT results, may need transfer to [facility] for ENT if mastoiditis present. No fever overnight, tachycardia improved.    Consultants/Specialty  None currently    Code Status  Full Code    Disposition  Continued hospitalization    Review of Systems  Review of Systems   Constitutional: Negative for chills and fever.   HENT: Positive for ear discharge, ear pain and hearing loss.    Respiratory: Negative for shortness of breath.    Cardiovascular: Negative for chest pain and leg swelling.   All other systems reviewed and are negative.       Physical Exam  Temp:  [36.3 °C (97.3 °F)-36.8 °C (98.2 °F)] 36.8 °C (98.2  °F)  Pulse:  [] 65  Resp:  [16-18] 18  BP: ()/(51-90) 110/65  SpO2:  [95 %-97 %] 95 %    Physical Exam  Constitutional:       Appearance: Normal appearance.   HENT:      Head: Normocephalic.      Comments: Erythema over R cheek, with mild lobe swelling, TTP of entire ear, R canal occluded by debris  Cardiovascular:      Rate and Rhythm: Normal rate and regular rhythm.      Heart sounds: No murmur.   Pulmonary:      Effort: Pulmonary effort is normal.      Breath sounds: Normal breath sounds.   Abdominal:      General: Abdomen is flat.   Skin:     Coloration: Skin is not jaundiced.   Neurological:      Mental Status: She is alert and oriented to person, place, and time.         Fluids    Intake/Output Summary (Last 24 hours) at 10/2/2020 0934  Last data filed at 10/2/2020 0556  Gross per 24 hour   Intake 763.33 ml   Output --   Net 763.33 ml       Laboratory  Recent Labs     09/29/20  1730 10/01/20  1906 10/02/20  0329   WBC 9.1 9.3 7.1   RBC 4.42 4.17* 3.88*   HEMOGLOBIN 12.2 11.3* 10.5*   HEMATOCRIT 37.8 34.9* 32.7*   MCV 85.5 83.7 84.3   MCH 27.6 27.1 27.1   MCHC 32.3* 32.4* 32.1*   RDW 46.3 44.6 45.7   PLATELETCT 273 284 259   MPV 9.2 9.2 9.2     Recent Labs     09/29/20  1730 10/01/20  1906 10/02/20  0329   SODIUM 139 142 142   POTASSIUM 4.3 3.5* 4.2   CHLORIDE 105 104 108   CO2 19* 24 23   GLUCOSE 95 86 95   BUN 10 8 8   CREATININE 0.66 0.74 0.79   CALCIUM 9.5 9.7 8.6                   Imaging  CT-MAXILLOFACIAL WITH PLUS RECONS    (Results Pending)        Assessment/Plan  Facial cellulitis with probable otitis externa and otitis media   Assessment & Plan  - Continue Clindamycin for now, await CT results to evaluation for mastoiditis  - Was previously scheduled for mastoidectomy and tympanostomy tubes in [place], insurance declined   - Start Cortisporin drops for likely otitis externa   - May need transfer to [facility] for ENT consult if mastoiditis present.    Tobacco dependence  Assessment & Plan  I  spent nearly 3.5 minutes on Tobacco cessation education and counseling.   I Discussed the benefits of quitting smoking and risks of continued smoking including cardiovascular disease, cancer and COPD.   Discussed options of nicotine patch, medical treatment with Wellbutrin [Bupropion] and Chantix [Varenicline].       Tachycardia  Assessment & Plan  - Resolved with fluids and pain control     Hypokalemia  Assessment & Plan  - Resolved        VTE prophylaxis: Lovenox

## 2020-10-02 NOTE — PROGRESS NOTES
Telemetry Shift Summary     Rhythm SR/ST  HR Range 62 - 104  Measurements 0.18 / 0.08 / 0.40           Normal Values  Rhythm SR  HR Range    Measurements 0.12-0.20 / 0.06-0.10  / 0.30-0.52

## 2020-10-02 NOTE — PROGRESS NOTES
Report received from KIRK Hope. Plan of care discussed. Patient resting comfortably in bed. Safety precautions in place.

## 2020-10-02 NOTE — ASSESSMENT & PLAN NOTE
- Continue Clindamycin for now, CT ruled out acute mastoiditis   - Was previously scheduled for mastoidectomy and tympanostomy tubes in [place], insurance declined   - Continue Cortisporin drops for likely otitis externa   - Likely dc in the am

## 2020-10-02 NOTE — PROGRESS NOTES
Received patient from the Emergency Department. Patient ambulated from the [facility] to the hospital bed. Patient is alert and oriented X 4. Safety precautions in place. Will continue to monitor patient.     [Fatigue] : fatigue [SOB on Exertion] : shortness of breath during exertion [Shortness Of Breath] : shortness of breath [Chest Pain] : no chest pain [FreeTextEntry4] : dry mouth [de-identified] : itchy, dry spots to bilateral arms [de-identified] : patient enjoys seeing a therapist 2x per week

## 2020-10-02 NOTE — ED TRIAGE NOTES
Chief Complaint   Patient presents with   • Facial Pain     redness   • N/V      pt complains of right facial redness. Pt states she was placed on Levaquin for her face infection but has been having n/v since she started it.     Negative covid screen.

## 2020-10-02 NOTE — ED PROVIDER NOTES
ED Provider Note    CHIEF COMPLAINT  Chief Complaint   Patient presents with   • Facial Pain     redness   • N/V       HPI  Charley Dunn is a 45 y.o. female who presents to the emergency department with complaint of cellulitis to the right side of her face.  The patient was seen here on the 29th for the same condition.  She was given Levaquin per the recommendation of Dr. Sanabria.  She states is happened 12 times in the past she is been hospitalized multiple times.  She is never had an abscess.  They do not know the source of her infection.  The patient states that the swelling has increased in severity in the right side of her face including the ear, cheeks.  Denies any dental pain or discharge or edema.  She did take the Levaquin immediately after started and has not been able to keep medications down since then.    REVIEW OF SYSTEMS  Positives as above. Pertinent negatives include fever, loss of sensation or strength in arms or legs, visual changes, severe headache.  All other review of systems are negative    PAST MEDICAL HISTORY  Past Medical History:   Diagnosis Date   • Anxiety    • Bradycardia    • Crohn's disease (HCC)    • MRSA (methicillin resistant Staphylococcus aureus)     to R face   • RA (rheumatoid arthritis) ([facility])        FAMILY HISTORY  Noncontributory    SOCIAL HISTORY  Social History     Socioeconomic History   • Marital status:      Spouse name: Not on file   • Number of children: Not on file   • Years of education: Not on file   • Highest education level: Not on file   Occupational History   • Not on file   Social Needs   • Financial resource strain: Not on file   • Food insecurity     Worry: Patient refused     Inability: Patient refused   • Transportation needs     Medical: No     Non-medical: No   Tobacco Use   • Smoking status: Current Every Day Smoker     Packs/day: 0.50     Years: 32.00     Pack years: 16.00     Types: Cigarettes   • Smokeless tobacco: Never Used   Substance  "and Sexual Activity   • Alcohol use: Not Currently     Comment: rare   • Drug use: Never   • Sexual activity: Not on file   Lifestyle   • Physical activity     Days per week: Not on file     Minutes per session: Not on file   • Stress: Not on file   Relationships   • Social connections     Talks on phone: Not on file     Gets together: Not on file     Attends Congregational service: Not on file     Active member of club or organization: Not on file     Attends meetings of clubs or organizations: Not on file     Relationship status: Not on file   • Intimate partner violence     Fear of current or ex partner: Not on file     Emotionally abused: Not on file     Physically abused: Not on file     Forced sexual activity: Not on file   Other Topics Concern   • Not on file   Social History Narrative   • Not on file       SURGICAL HISTORY  Past Surgical History:   Procedure Laterality Date   • OTHER     • OTHER ABDOMINAL SURGERY         CURRENT MEDICATIONS  Home Medications    **Home medications have not yet been reviewed for this encounter**         ALLERGIES  Allergies   Allergen Reactions   • Pcn [Penicillins] Anaphylaxis   • Toradol Hives     Migraine     • Tramadol Hives     migraine     • Bactrim [Sulfamethoxazole W-Trimethoprim] Itching     Face itching     • Stadol [Butorphanol] Unspecified     Pt reports it makes her feel like she can't wake up   • Blackberry [Rubus Fruticosus]      Oral swelling   • Fish Swelling   • Levaquin      Vomiting    • Tree Nuts Food Allergy        PHYSICAL EXAM  VITAL SIGNS: /90   Pulse (!) 110   Temp 36.3 °C (97.3 °F) (Temporal)   Resp 16   Ht 1.727 m (5' 8\")   Wt 71.3 kg (157 lb 3 oz)   SpO2 97%   BMI 23.90 kg/m²      Constitutional: Well developed, Well nourished, No acute distress, Non-toxic appearance.   Eyes: PERRLA, EOMI, Conjunctiva normal, No discharge.   HENT: The right ear has significant erythema and edema external aspect, slight erythema and edema to the posterior " auricle region, slight erythema tracking of the left cheek zygomatic process and edema on bilateral zygomatic region, there is no evidence of tympanic membrane edema or bulging  Cardiovascular: Tachycardic, normal rhythm, No murmurs, No rubs, No gallops, and intact distal pulses.   Thorax & Lungs:  No respiratory distress, no rales, no rhonchi, No wheezing, No chest wall tenderness.   Abdomen: Bowel sounds normal, Soft, No tenderness, No guarding, No rebound, No pulsatile masses.   Skin: The right ear has significant erythema and edema external aspect, slight erythema and edema to the posterior auricle region, slight erythema tracking of the left cheek zygomatic process and edema on bilateral zygomatic region  Extremities: Full range of motion, no deformity, no edema.  Neurologic: Renal nerves II through XII are intact  Psychiatric: Affect normal for clinical presentation.      RADIOLOGY/PROCEDURES  Did review the CT scan is completed on 8/28/20 that revealed no evidence of abscess    COURSE & MEDICAL DECISION MAKING  Pertinent Labs & Imaging studies reviewed. (See chart for details)  This is a pleasant 45-year-old female with facial cellulitis and failed outpatient Levaquin.  The patient has notes of surgical condition currently.  She initially was tachycardic therefore she received 1 L lactated there is fluid and had a significant decrease in her heart rate and responded appropriate IV fluid ministration.  The patient did receive clindamycin IV for her condition.  I do believe the patient requires hospitalization for failing outpatient antibiotics.  She does not have a surgical condition currently.  I have discussed the patient with Dr. Metcalf who graciously has hospitalized the patient.    For pain control, the patient received Dilaudid 1 mg IV.  FINAL IMPRESSION  Facial cellulitis         Electronically signed by: Pascual Aguilar D.O., 10/1/2020 9:19 PM

## 2020-10-03 PROCEDURE — 700111 HCHG RX REV CODE 636 W/ 250 OVERRIDE (IP): Performed by: HOSPITALIST

## 2020-10-03 PROCEDURE — A9270 NON-COVERED ITEM OR SERVICE: HCPCS | Performed by: INTERNAL MEDICINE

## 2020-10-03 PROCEDURE — A9270 NON-COVERED ITEM OR SERVICE: HCPCS | Performed by: FAMILY MEDICINE

## 2020-10-03 PROCEDURE — 700101 HCHG RX REV CODE 250: Performed by: HOSPITALIST

## 2020-10-03 PROCEDURE — 96376 TX/PRO/DX INJ SAME DRUG ADON: CPT

## 2020-10-03 PROCEDURE — 99225 PR SUBSEQUENT OBSERVATION CARE,LEVEL II: CPT | Performed by: FAMILY MEDICINE

## 2020-10-03 PROCEDURE — 700102 HCHG RX REV CODE 250 W/ 637 OVERRIDE(OP): Performed by: FAMILY MEDICINE

## 2020-10-03 PROCEDURE — A9270 NON-COVERED ITEM OR SERVICE: HCPCS | Performed by: HOSPITALIST

## 2020-10-03 PROCEDURE — 770020 HCHG ROOM/CARE - TELE (206)

## 2020-10-03 PROCEDURE — 96372 THER/PROPH/DIAG INJ SC/IM: CPT

## 2020-10-03 PROCEDURE — 700102 HCHG RX REV CODE 250 W/ 637 OVERRIDE(OP): Performed by: HOSPITALIST

## 2020-10-03 PROCEDURE — 700102 HCHG RX REV CODE 250 W/ 637 OVERRIDE(OP): Performed by: INTERNAL MEDICINE

## 2020-10-03 PROCEDURE — 96366 THER/PROPH/DIAG IV INF ADDON: CPT

## 2020-10-03 RX ORDER — OXYCODONE AND ACETAMINOPHEN 7.5; 325 MG/1; MG/1
1 TABLET ORAL EVERY 4 HOURS PRN
Status: DISCONTINUED | OUTPATIENT
Start: 2020-10-03 | End: 2020-10-04 | Stop reason: HOSPADM

## 2020-10-03 RX ADMIN — ENOXAPARIN SODIUM 40 MG: 40 INJECTION SUBCUTANEOUS at 04:40

## 2020-10-03 RX ADMIN — POTASSIUM CHLORIDE AND SODIUM CHLORIDE: 900; 150 INJECTION, SOLUTION INTRAVENOUS at 04:40

## 2020-10-03 RX ADMIN — POTASSIUM CHLORIDE AND SODIUM CHLORIDE 1000 ML: 900; 150 INJECTION, SOLUTION INTRAVENOUS at 21:18

## 2020-10-03 RX ADMIN — NEOMYCIN SULFATE, POLYMYXIN B SULFATE AND HYDROCORTISONE 4 DROP: 10; 3.5; 1 SUSPENSION/ DROPS AURICULAR (OTIC) at 04:39

## 2020-10-03 RX ADMIN — OXYCODONE HYDROCHLORIDE AND ACETAMINOPHEN 1 TABLET: 7.5; 325 TABLET ORAL at 18:05

## 2020-10-03 RX ADMIN — CLINDAMYCIN IN 5 PERCENT DEXTROSE 600 MG: 12 INJECTION, SOLUTION INTRAVENOUS at 11:37

## 2020-10-03 RX ADMIN — CLINDAMYCIN IN 5 PERCENT DEXTROSE 600 MG: 12 INJECTION, SOLUTION INTRAVENOUS at 19:00

## 2020-10-03 RX ADMIN — HYDROMORPHONE HYDROCHLORIDE 0.25 MG: 1 INJECTION, SOLUTION INTRAMUSCULAR; INTRAVENOUS; SUBCUTANEOUS at 04:40

## 2020-10-03 RX ADMIN — NICOTINE TRANSDERMAL SYSTEM 21 MG: 21 PATCH, EXTENDED RELEASE TRANSDERMAL at 04:40

## 2020-10-03 RX ADMIN — NEOMYCIN SULFATE, POLYMYXIN B SULFATE AND HYDROCORTISONE 4 DROP: 10; 3.5; 1 SUSPENSION/ DROPS AURICULAR (OTIC) at 18:05

## 2020-10-03 RX ADMIN — OXYCODONE HYDROCHLORIDE AND ACETAMINOPHEN 1 TABLET: 7.5; 325 TABLET ORAL at 22:16

## 2020-10-03 RX ADMIN — QUETIAPINE FUMARATE 50 MG: 25 TABLET ORAL at 21:17

## 2020-10-03 RX ADMIN — CLINDAMYCIN IN 5 PERCENT DEXTROSE 600 MG: 12 INJECTION, SOLUTION INTRAVENOUS at 04:39

## 2020-10-03 RX ADMIN — PROMETHAZINE HYDROCHLORIDE 25 MG: 25 TABLET ORAL at 18:05

## 2020-10-03 RX ADMIN — HYDROMORPHONE HYDROCHLORIDE 0.25 MG: 1 INJECTION, SOLUTION INTRAMUSCULAR; INTRAVENOUS; SUBCUTANEOUS at 12:02

## 2020-10-03 RX ADMIN — HYDROMORPHONE HYDROCHLORIDE 0.25 MG: 1 INJECTION, SOLUTION INTRAMUSCULAR; INTRAVENOUS; SUBCUTANEOUS at 08:19

## 2020-10-03 RX ADMIN — NEOMYCIN SULFATE, POLYMYXIN B SULFATE AND HYDROCORTISONE 4 DROP: 10; 3.5; 1 SUSPENSION/ DROPS AURICULAR (OTIC) at 11:37

## 2020-10-03 ASSESSMENT — PAIN DESCRIPTION - PAIN TYPE
TYPE: ACUTE PAIN

## 2020-10-03 ASSESSMENT — ENCOUNTER SYMPTOMS
CHILLS: 0
SHORTNESS OF BREATH: 0
FEVER: 0

## 2020-10-03 ASSESSMENT — PATIENT HEALTH QUESTIONNAIRE - PHQ9
2. FEELING DOWN, DEPRESSED, IRRITABLE, OR HOPELESS: NOT AT ALL
SUM OF ALL RESPONSES TO PHQ9 QUESTIONS 1 AND 2: 0
1. LITTLE INTEREST OR PLEASURE IN DOING THINGS: NOT AT ALL

## 2020-10-03 ASSESSMENT — FIBROSIS 4 INDEX: FIB4 SCORE: 0.68

## 2020-10-03 NOTE — CARE PLAN
Problem: Communication  Goal: The ability to communicate needs accurately and effectively will improve  Outcome: PROGRESSING AS EXPECTED  Note: AO4 able to verbalize needs clearly. Demonstrates appropriate use of call light.        Problem: Safety  Goal: Will remain free from falls  Outcome: PROGRESSING AS EXPECTED  Intervention: Assess risk factors for falls  Flowsheets  Taken 10/3/2020 0253  Fall Risk: Risk to Fall -  0 - 1 point  History of fall: 0  Mobility Status Assessment: 0-Ambulates & Transfers Independently. No Assistance Required  Risk for Injury-Any positive answers results in the pt being at high risk for fall related injury: Not Applicable  Taken 10/2/2020 2000  Pt Calls for Assistance:   Yes   No assistance required  Intervention: Implement fall precautions  Flowsheets  Taken 10/3/2020 0253  Bed Alarm: Alarm Not On  IV Pole on Same Side of Bed as Bathroom: Yes  Taken 10/2/2020 2000  Environmental Precautions:   Treaded Slipper Socks on Patient   Personal Belongings, Wastebasket, Call Bell etc. in Easy Reach   Transferred to Stronger Side   Report Given to Other Health Care Providers Regarding Fall Risk   Bed in Low Position   Communication Sign for Patients & Families   Mobility Assessed & Appropriate Sign Placed

## 2020-10-03 NOTE — PROGRESS NOTES
Report received from KIRK Lowry. Plan of care discussed. Patient resting comfortably in bed. Safety precautions in place.

## 2020-10-03 NOTE — PROGRESS NOTES
Telemetry Shift Summary    Rhythm: SR  HR Range: 60-90s  Ectopy: Per Monitor Tech Renata:   No ectopy    Measurements for strip printed 10/3/2020 at 0305:   HR 71     0.18/0.08/0.40        Normal Values  Rhythm SR  HR Range    Measurements 0.12-0.20 / 0.06-0.10  / 0.30-0.52

## 2020-10-03 NOTE — PROGRESS NOTES
[facility] Medicine Daily Progress Note    Date of Service  10/3/2020    Chief Complaint  45 y.o. female admitted 10/1/2020 with facial cellulitis, possible mastoiditis     Hospital Course  45 y.o. female with a past medical history of anxiety, rheumatoid arthritis, Crohn's disease, tobacco use who presented 10/1/2020 with worsening right-sided facial rash.  Patient has been having recurrent right-sided facial cellulitis she was recently prescribed Levaquin however developed itching and vomiting after few doses.  She reports associated right-sided ear pain that is severe 10 out of 10 no radiation to other places no relieving factors.  She also reports having associated chills but no shortness of breath or coughing.  She is not currently on immunosuppressive medications for her rheumatoid arthritis and Crohn's disease.     Interval Problem Update  10/2 - Pt relates having a 12 year history of chronic intermittent mastoiditis, was scheduled to have mastoid debridement/mastoidectomy per patient but her insurance would not approve it (in [place]). Went to the ER 8/28 and was admitted, but left AMA.  ER visit again 9/2, was discharged with Clindamycin on 9/4. Recurrence of symptoms again on 9/13, was given Levaquin but had vomiting with that, so she self stopped.  Returned 10/1 with facial swelling and ear pain. Awaiting CT results, may need transfer to [facility] for ENT if mastoiditis present. No fever overnight, tachycardia improved.    10/3 - CT does not show mastoiditis, pt with otitis externa and facial cellulitis. Continue IV antibiotics, cellulitis is improving, may be able to dc tomorrow. Stop IV opiates, start Percocet in anticipation of discharge.     Consultants/Specialty  None currently    Code Status  Full Code    Disposition  Continued hospitalization    Review of Systems  Review of Systems   Constitutional: Negative for chills and fever.   HENT: Positive for ear discharge, ear pain and hearing loss.     Respiratory: Negative for shortness of breath.    Cardiovascular: Negative for chest pain and leg swelling.   All other systems reviewed and are negative.       Physical Exam  Temp:  [36.6 °C (97.8 °F)-36.8 °C (98.2 °F)] 36.6 °C (97.8 °F)  Pulse:  [61-90] 80  Resp:  [18] 18  BP: ()/(60-91) 119/91  SpO2:  [90 %-99 %] 99 %    Physical Exam  Constitutional:       Appearance: Normal appearance.   HENT:      Head: Normocephalic.      Comments: Improved erythema over R cheek, with mild lobe swelling, R canal occluded by debris  Cardiovascular:      Rate and Rhythm: Normal rate and regular rhythm.      Heart sounds: No murmur.   Pulmonary:      Effort: Pulmonary effort is normal.      Breath sounds: Normal breath sounds.   Abdominal:      General: Abdomen is flat.   Skin:     Coloration: Skin is not jaundiced.   Neurological:      Mental Status: She is alert and oriented to person, place, and time.         Fluids    Intake/Output Summary (Last 24 hours) at 10/3/2020 1407  Last data filed at 10/3/2020 1200  Gross per 24 hour   Intake 1780 ml   Output --   Net 1780 ml       Laboratory  Recent Labs     10/01/20  1906 10/02/20  0329   WBC 9.3 7.1   RBC 4.17* 3.88*   HEMOGLOBIN 11.3* 10.5*   HEMATOCRIT 34.9* 32.7*   MCV 83.7 84.3   MCH 27.1 27.1   MCHC 32.4* 32.1*   RDW 44.6 45.7   PLATELETCT 284 259   MPV 9.2 9.2     Recent Labs     10/01/20  1906 10/02/20  0329   SODIUM 142 142   POTASSIUM 3.5* 4.2   CHLORIDE 104 108   CO2 24 23   GLUCOSE 86 95   BUN 8 8   CREATININE 0.74 0.79   CALCIUM 9.7 8.6                   Imaging  CT-MAXILLOFACIAL WITH PLUS RECONS   Final Result      1.  Nonspecific asymmetric soft tissue thickening in the right external auditory canal is again noted. There is some mild periauricular/periparotid fat infiltration. This could represent otitis externa in the appropriate clinical setting. Suggest direct    inspection. Differential consideration parotitis. There is no focal fluid collection.   2.   Mild debris/cerumen in the left external auditory canal and fluid within a few left mastoid air cells again noted.   3.  Extensive dental disease.           Assessment/Plan  Facial cellulitis with  otitis externa  Assessment & Plan  - Continue Clindamycin for now, CT ruled out acute mastoiditis   - Was previously scheduled for mastoidectomy and tympanostomy tubes in [place], insurance declined   - Continue Cortisporin drops for likely otitis externa   - Likely dc in the am    Tobacco dependence  Assessment & Plan  - Education given by my colleague     Tachycardia  Assessment & Plan  - Resolved with fluids and pain control     Hypokalemia  Assessment & Plan  - Resolved        VTE prophylaxis: Lovenox

## 2020-10-04 VITALS
BODY MASS INDEX: 22.42 KG/M2 | OXYGEN SATURATION: 100 % | RESPIRATION RATE: 16 BRPM | TEMPERATURE: 97.9 F | HEART RATE: 64 BPM | WEIGHT: 147.93 LBS | SYSTOLIC BLOOD PRESSURE: 103 MMHG | DIASTOLIC BLOOD PRESSURE: 67 MMHG | HEIGHT: 68 IN

## 2020-10-04 PROBLEM — E87.6 HYPOKALEMIA: Status: RESOLVED | Noted: 2020-09-02 | Resolved: 2020-10-04

## 2020-10-04 PROBLEM — R00.0 TACHYCARDIA: Status: RESOLVED | Noted: 2020-10-01 | Resolved: 2020-10-04

## 2020-10-04 LAB
ANION GAP SERPL CALC-SCNC: 13 MMOL/L (ref 7–16)
BASOPHILS # BLD AUTO: 0.5 % (ref 0–1.8)
BASOPHILS # BLD: 0.03 K/UL (ref 0–0.12)
BUN SERPL-MCNC: 8 MG/DL (ref 8–22)
CALCIUM SERPL-MCNC: 9.1 MG/DL (ref 8.4–10.2)
CHLORIDE SERPL-SCNC: 105 MMOL/L (ref 96–112)
CO2 SERPL-SCNC: 22 MMOL/L (ref 20–33)
CREAT SERPL-MCNC: 0.6 MG/DL (ref 0.5–1.4)
EOSINOPHIL # BLD AUTO: 0.3 K/UL (ref 0–0.51)
EOSINOPHIL NFR BLD: 4.9 % (ref 0–6.9)
ERYTHROCYTE [DISTWIDTH] IN BLOOD BY AUTOMATED COUNT: 43.9 FL (ref 35.9–50)
GLUCOSE SERPL-MCNC: 109 MG/DL (ref 65–99)
HCT VFR BLD AUTO: 34.6 % (ref 37–47)
HGB BLD-MCNC: 11.4 G/DL (ref 12–16)
IMM GRANULOCYTES # BLD AUTO: 0.01 K/UL (ref 0–0.11)
IMM GRANULOCYTES NFR BLD AUTO: 0.2 % (ref 0–0.9)
LYMPHOCYTES # BLD AUTO: 2.7 K/UL (ref 1–4.8)
LYMPHOCYTES NFR BLD: 44 % (ref 22–41)
MCH RBC QN AUTO: 27.5 PG (ref 27–33)
MCHC RBC AUTO-ENTMCNC: 32.9 G/DL (ref 33.6–35)
MCV RBC AUTO: 83.6 FL (ref 81.4–97.8)
MONOCYTES # BLD AUTO: 0.34 K/UL (ref 0–0.85)
MONOCYTES NFR BLD AUTO: 5.5 % (ref 0–13.4)
NEUTROPHILS # BLD AUTO: 2.76 K/UL (ref 2–7.15)
NEUTROPHILS NFR BLD: 44.9 % (ref 44–72)
NRBC # BLD AUTO: 0 K/UL
NRBC BLD-RTO: 0 /100 WBC
PLATELET # BLD AUTO: 261 K/UL (ref 164–446)
PMV BLD AUTO: 9 FL (ref 9–12.9)
POTASSIUM SERPL-SCNC: 4.1 MMOL/L (ref 3.6–5.5)
RBC # BLD AUTO: 4.14 M/UL (ref 4.2–5.4)
SODIUM SERPL-SCNC: 140 MMOL/L (ref 135–145)
WBC # BLD AUTO: 6.1 K/UL (ref 4.8–10.8)

## 2020-10-04 PROCEDURE — 80048 BASIC METABOLIC PNL TOTAL CA: CPT

## 2020-10-04 PROCEDURE — 700101 HCHG RX REV CODE 250: Performed by: HOSPITALIST

## 2020-10-04 PROCEDURE — 96372 THER/PROPH/DIAG INJ SC/IM: CPT

## 2020-10-04 PROCEDURE — 90686 IIV4 VACC NO PRSV 0.5 ML IM: CPT | Performed by: HOSPITALIST

## 2020-10-04 PROCEDURE — 700102 HCHG RX REV CODE 250 W/ 637 OVERRIDE(OP): Performed by: FAMILY MEDICINE

## 2020-10-04 PROCEDURE — 85025 COMPLETE CBC W/AUTO DIFF WBC: CPT

## 2020-10-04 PROCEDURE — 99239 HOSP IP/OBS DSCHRG MGMT >30: CPT | Performed by: FAMILY MEDICINE

## 2020-10-04 PROCEDURE — G0378 HOSPITAL OBSERVATION PER HR: HCPCS

## 2020-10-04 PROCEDURE — 90471 IMMUNIZATION ADMIN: CPT

## 2020-10-04 PROCEDURE — A9270 NON-COVERED ITEM OR SERVICE: HCPCS | Performed by: FAMILY MEDICINE

## 2020-10-04 PROCEDURE — 700102 HCHG RX REV CODE 250 W/ 637 OVERRIDE(OP): Performed by: HOSPITALIST

## 2020-10-04 PROCEDURE — 700102 HCHG RX REV CODE 250 W/ 637 OVERRIDE(OP): Performed by: INTERNAL MEDICINE

## 2020-10-04 PROCEDURE — A9270 NON-COVERED ITEM OR SERVICE: HCPCS | Performed by: HOSPITALIST

## 2020-10-04 PROCEDURE — 700111 HCHG RX REV CODE 636 W/ 250 OVERRIDE (IP): Performed by: HOSPITALIST

## 2020-10-04 PROCEDURE — 96366 THER/PROPH/DIAG IV INF ADDON: CPT

## 2020-10-04 PROCEDURE — A9270 NON-COVERED ITEM OR SERVICE: HCPCS | Performed by: INTERNAL MEDICINE

## 2020-10-04 RX ORDER — OXYCODONE AND ACETAMINOPHEN 7.5; 325 MG/1; MG/1
1 TABLET ORAL EVERY 6 HOURS PRN
Qty: 16 TAB | Refills: 0 | Status: SHIPPED | OUTPATIENT
Start: 2020-10-04 | End: 2020-10-08

## 2020-10-04 RX ORDER — CLINDAMYCIN HYDROCHLORIDE 300 MG/1
600 CAPSULE ORAL 3 TIMES DAILY
Qty: 42 CAP | Refills: 0 | Status: ON HOLD | OUTPATIENT
Start: 2020-10-04 | End: 2020-10-11

## 2020-10-04 RX ADMIN — NEOMYCIN SULFATE, POLYMYXIN B SULFATE AND HYDROCORTISONE 4 DROP: 10; 3.5; 1 SUSPENSION/ DROPS AURICULAR (OTIC) at 06:29

## 2020-10-04 RX ADMIN — NICOTINE TRANSDERMAL SYSTEM 21 MG: 21 PATCH, EXTENDED RELEASE TRANSDERMAL at 06:29

## 2020-10-04 RX ADMIN — PROMETHAZINE HYDROCHLORIDE 25 MG: 25 TABLET ORAL at 03:13

## 2020-10-04 RX ADMIN — ENOXAPARIN SODIUM 40 MG: 40 INJECTION SUBCUTANEOUS at 06:29

## 2020-10-04 RX ADMIN — OXYCODONE HYDROCHLORIDE AND ACETAMINOPHEN 1 TABLET: 7.5; 325 TABLET ORAL at 03:13

## 2020-10-04 RX ADMIN — CLINDAMYCIN IN 5 PERCENT DEXTROSE 600 MG: 12 INJECTION, SOLUTION INTRAVENOUS at 03:13

## 2020-10-04 RX ADMIN — OXYCODONE HYDROCHLORIDE AND ACETAMINOPHEN 1 TABLET: 7.5; 325 TABLET ORAL at 07:45

## 2020-10-04 RX ADMIN — POTASSIUM CHLORIDE AND SODIUM CHLORIDE 1000 ML: 900; 150 INJECTION, SOLUTION INTRAVENOUS at 06:36

## 2020-10-04 RX ADMIN — INFLUENZA A VIRUS A/GUANGDONG-MAONAN/SWL1536/2019 CNIC-1909 (H1N1) ANTIGEN (FORMALDEHYDE INACTIVATED), INFLUENZA A VIRUS A/HONG KONG/2671/2019 (H3N2) ANTIGEN (FORMALDEHYDE INACTIVATED), INFLUENZA B VIRUS B/PHUKET/3073/2013 ANTIGEN (FORMALDEHYDE INACTIVATED), AND INFLUENZA B VIRUS B/WASHINGTON/02/2019 ANTIGEN (FORMALDEHYDE INACTIVATED) 0.5 ML: 15; 15; 15; 15 INJECTION, SUSPENSION INTRAMUSCULAR at 07:46

## 2020-10-04 ASSESSMENT — PAIN DESCRIPTION - PAIN TYPE
TYPE: ACUTE PAIN
TYPE: ACUTE PAIN

## 2020-10-04 ASSESSMENT — FIBROSIS 4 INDEX: FIB4 SCORE: 0.67

## 2020-10-04 NOTE — DISCHARGE INSTRUCTIONS
Diet: Diet Order  Activity: As tolerated  Follow Up: Please obtain PCP and ENT referral ASAP after discharge   Disposition:Home  Diagnosis: Otitis externa, facial cellulitis     Follow up with your Primary Care Provider No primary care provider on file. as scheduled or sooner if your symptoms persist or worsen.  Return to Emergency Room for severe chest pain, shortness of breath, signs of a stroke, or any other emergencies.      Discharge Instructions    Discharged to home by car with relative. Discharged via walking, hospital escort: Refused.  Special equipment needed: Not Applicable    Be sure to schedule a follow-up appointment with your primary care doctor or any specialists as instructed.     Discharge Plan:   Influenza Vaccine Indication: Indicated: 9 to 64 years of age  Influenza Vaccine Given - only chart on this line when given: Influenza Vaccine Given (See MAR)    I understand that a diet low in cholesterol, fat, and sodium is recommended for good health. Unless I have been given specific instructions below for another diet, I accept this instruction as my diet prescription.   Other diet: low salt, low fat,  Heart healthy    Special Instructions: None    · Is patient discharged on Warfarin / Coumadin?   No     Depression / Suicide Risk    As you are discharged from this [facility] facility, it is important to learn how to keep safe from harming yourself.    Recognize the warning signs:  · Abrupt changes in personality, positive or negative- including increase in energy   · Giving away possessions  · Change in eating patterns- significant weight changes-  positive or negative  · Change in sleeping patterns- unable to sleep or sleeping all the time   · Unwillingness or inability to communicate  · Depression  · Unusual sadness, discouragement and loneliness  · Talk of wanting to die  · Neglect of personal appearance   · Rebelliousness- reckless behavior  · Withdrawal from people/activities they  love  · Confusion- inability to concentrate     If you or a loved one observes any of these behaviors or has concerns about self-harm, here's what you can do:  · Talk about it- your feelings and reasons for harming yourself  · Remove any means that you might use to hurt yourself (examples: pills, rope, extension cords, firearm)  · Get professional help from the community (Mental Health, Substance Abuse, psychological counseling)  · Do not be alone:Call your Safe Contact- someone whom you trust who will be there for you.  · Call your local CRISIS HOTLINE 271-7506 or 368-445-9964  · Call your local Children's Mobile Crisis Response Team [place] (837) 684-4698 or www.Alum.ni  · Call the toll free National Suicide Prevention Hotlines   · National Suicide Prevention Lifeline 603-410-IZRK (5703)  · [employer] Line [employer] 800-SUICIDE (480-9906)      Otitis Externa    Otitis externa is an infection of the outer ear canal. The outer ear canal is the area between the outside of the ear and the eardrum. Otitis externa is sometimes called swimmer's ear.  What are the causes?  Common causes of this condition include:  · Swimming in dirty water.  · Moisture in the ear.  · An injury to the inside of the ear.  · An object stuck in the ear.  · A cut or scrape on the outside of the ear.  What increases the risk?  You are more likely to get this condition if you go swimming often.  What are the signs or symptoms?  · Itching in the ear. This is often the first symptom.  · Swelling of the ear.  · Redness in the ear.  · Ear pain. The pain may get worse when you pull on your ear.  · Pus coming from the ear.  How is this treated?  This condition may be treated with:  · Antibiotic ear drops. These are often given for 10-14 days.  · Medicines to reduce itching and swelling.  Follow these instructions at home:  · If you were given antibiotic ear drops, use them as told by your doctor. Do not stop using them even if your  condition gets better.  · Take over-the-counter and prescription medicines only as told by your doctor.  · Avoid getting water in your ears as told by your doctor. You may be told to avoid swimming or water sports for a few days.  · Keep all follow-up visits as told by your doctor. This is important.  How is this prevented?  · Keep your ears dry. Use the corner of a towel to dry your ears after you swim or bathe.  · Try not to scratch or put things in your ear. Doing these things makes it easier for germs to grow in your ear.  · Avoid swimming in lakes, dirty water, or pools that may not have the right amount of a chemical called chlorine.  Contact a doctor if:  · You have a fever.  · Your ear is still red, swollen, or painful after 3 days.  · You still have pus coming from your ear after 3 days.  · Your redness, swelling, or pain gets worse.  · You have a really bad headache.  · You have redness, swelling, pain, or tenderness behind your ear.  Summary  · Otitis externa is an infection of the outer ear canal.  · Symptoms include pain, redness, and swelling of the ear.  · If you were given antibiotic ear drops, use them as told by your doctor. Do not stop using them even if your condition gets better.  · Try not to scratch or put things in your ear.  This information is not intended to replace advice given to you by your health care provider. Make sure you discuss any questions you have with your health care provider.  Document Released: 06/05/2009 Document Revised: 05/24/2019 Document Reviewed: 05/24/2019  [employer] Patient Education © 2020 [employer]    Clindamycin capsules  What is this medicine?  CLINDAMYCIN (KLIN da MYE sin) is a lincosamide antibiotic. It is used to treat certain kinds of bacterial infections. It will not work for colds, flu, or other viral infections.  This medicine may be used for other purposes; ask your health care provider or pharmacist if you have questions.  COMMON BRAND NAME(S):  Cleocin  What should I tell my health care provider before I take this medicine?  They need to know if you have any of these conditions:  · kidney disease  · liver disease  · stomach problems like colitis  · an unusual or allergic reaction to clindamycin, lincomycin, or other medicines, foods, dyes like tartrazine or preservatives  · pregnant or trying to get pregnant  · breast-feeding  How should I use this medicine?  Take this medicine by mouth with a full glass of water. Follow the directions on the prescription label. You can take this medicine with food or on an empty stomach. If the medicine upsets your stomach, take it with food. Take your medicine at regular intervals. Do not take your medicine more often than directed. Take all of your medicine as directed even if you think your are better. Do not skip doses or stop your medicine early.  Talk to your pediatrician regarding the use of this medicine in children. Special care may be needed.  Overdosage: If you think you have taken too much of this medicine contact a poison control center or emergency room at once.  NOTE: This medicine is only for you. Do not share this medicine with others.  What if I miss a dose?  If you miss a dose, take it as soon as you can. If it is almost time for your next dose, take only that dose. Do not take double or extra doses.  What may interact with this medicine?  · birth control pills  · erythromycin  · medicines that relax muscles for surgery  · rifampin  This list may not describe all possible interactions. Give your health care provider a list of all the medicines, herbs, non-prescription drugs, or dietary supplements you use. Also tell them if you smoke, drink alcohol, or use illegal drugs. Some items may interact with your medicine.  What should I watch for while using this medicine?  Tell your doctor or health care provider if your symptoms do not start to get better or if they get worse.  This medicine may cause serious  skin reactions. They can happen weeks to months after starting the medicine. Contact your health care provider right away if you notice fevers or flu-like symptoms with a rash. The rash may be red or purple and then turn into blisters or peeling of the skin. Or, you might notice a red rash with swelling of the face, lips or lymph nodes in your neck or under your arms.  Do not treat diarrhea with over the counter products. Contact your doctor if you have diarrhea that lasts more than 2 days or if it is severe and watery.  What side effects may I notice from receiving this medicine?  Side effects that you should report to your doctor or health care professional as soon as possible:  · allergic reactions like skin rash, itching or hives, swelling of the face, lips, or tongue  · dark urine  · pain on swallowing  · rash, fever, and swollen lymph nodes  · redness, blistering, peeling or loosening of the skin, including inside the mouth  · unusual bleeding or bruising  · unusually weak or tired  · yellowing of eyes or skin  Side effects that usually do not require medical attention (report to your doctor or health care professional if they continue or are bothersome):  · diarrhea  · itching in the rectal or genital area  · joint pain  · nausea, vomiting  · stomach pain  This list may not describe all possible side effects. Call your doctor for medical advice about side effects. You may report side effects to FDA at 3-680-FDA-4301.  Where should I keep my medicine?  Keep out of the reach of children.  Store at room temperature between 20 and 25 degrees C (68 and 77 degrees F). Throw away any unused medicine after the expiration date.  NOTE: This sheet is a summary. It may not cover all possible information. If you have questions about this medicine, talk to your doctor, pharmacist, or health care provider.  © 2020 Elsevier/Gold Standard (2020-03-19 12:02:12)    Hydrocortisone; Neomycin; Polymyxin B ear solution  What is  this medicine?  HYDROCORTISONE; NEOMYCIN; and POLYMYXIN B (radha droe KOR ti sone; nee oh MYE sin; valeria i MIX in B) is used to treat ear infections.  This medicine may be used for other purposes; ask your health care provider or pharmacist if you have questions.  COMMON BRAND NAME(S): AK-Spore HC, AK-Spore HC Otic, Antibiotic Otic, Cortisporin, Cortomycin, Estelle-Sone, Oticin HC, Otimar, Otocidin  What should I tell my health care provider before I take this medicine?  They need to know if you have any of these conditions:  any other active infections  chronic ear infections or fluid in the ear  perforated ear drum  an unusual or allergic reaction to hydrocortisone, neomycin, polymyxin B, sulfites, other medicines, foods, dyes, or preservatives  pregnant or trying to get pregnant  breast-feeding  How should I use this medicine?  This medicine is only for use in the ears. Follow the directions on the prescription label. Wash hands before and after use. Clean your ear of any fluid that can be easily removed. Do not insert any object or swab into the ear canal. Gently warm the bottle by holding it in the hand for 1 to 2 minutes. Lie down on your side with the infected ear facing upward. Try not to touch the tip of the dropper to your ear, fingertips, or other surface. Squeeze the bottle gently to put the prescribed number of drops in the ear canal. Stay in this position for 30 to 60 seconds to help the drops soak into the ear. Repeat the steps for the other ear if both ears are infected. Do not use your medicine more often than directed. Finish the full course of medicine prescribed by your doctor or health care professional even if you think your condition is better.  Talk to your pediatrician regarding the use of this medicine in children. While this drug may be prescribed for selected conditions, precautions do apply.  Overdosage: If you think you have taken too much of this medicine contact a poison control center or  emergency room at once.  NOTE: This medicine is only for you. Do not share this medicine with others.  What if I miss a dose?  If you miss a dose, use it as soon as you can. If it is almost time for your next dose, use only that dose. Do not take double or extra doses.  What may interact with this medicine?  Interactions are not expected. Do not use other ear products without talking to your doctor or health care professional.  This list may not describe all possible interactions. Give your health care provider a list of all the medicines, herbs, non-prescription drugs, or dietary supplements you use. Also tell them if you smoke, drink alcohol, or use illegal drugs. Some items may interact with your medicine.  What should I watch for while using this medicine?  Tell your doctor or health care professional if your ear infection does not get better in a few days. Do not use longer than 10 days unless instructed by your doctor or health care professional.  If rash or allergic reaction occurs, stop the product immediately and contact your physician.  It is important that you keep the infected ear(s) clean and dry. When bathing, try not to get the infected ear(s) wet. Do not go swimming unless your doctor or health care professional has told you otherwise.  To prevent the spread of infection, do not share ear products, or share towels and washcloths with anyone else.  What side effects may I notice from receiving this medicine?  Side effects that you should report to your doctor or health care professional as soon as possible:  rash  red, itchy, dry scaly skin at the affected site  worsening ear pain  Side effects that usually do not require medical attention (report to your doctor or health care professional if they continue or are bothersome):  abnormal sensation in the ear  burning or stinging while putting the drops in the ear  This list may not describe all possible side effects. Call your doctor for medical advice  about side effects. You may report side effects to FDA at 3-633-KHT-7251.  Where should I keep my medicine?  Keep out of the reach of children.  Store at room temperature between 15 and 25 degrees C (59 and 77 degrees F). Do not freeze. Throw away any unused medicine after the expiration date.  NOTE: This sheet is a summary. It may not cover all possible information. If you have questions about this medicine, talk to your doctor, pharmacist, or health care provider.  © 2020 Elsevier/Gold Standard (2016-06-07 15:13:48)      Antibiotic Medicine, Adult    Antibiotic medicines treat infections caused by a type of germ called bacteria. They work by killing the bacteria that make you sick.  When do I need to take antibiotics?  You often need these medicines to treat bacterial infections, such as:  · A urinary tract infection (UTI).  · Strep throat.  · Meningitis. This affects the spinal cord and brain.  · A bad lung infection.  You may start the medicines while your doctor waits for tests to come back. When the tests come back, your doctor may change or stop your medicine.  When are antibiotics not needed?  You do not need these medicines for most common illnesses, such as:  · A cold.  · The flu.  · A sore throat.  Antibiotics are not always needed for all infections caused by bacteria. Do not ask for these medicines, or take them, when they are not needed.  What are the risks of taking antibiotics?  Most antibiotics can cause an infection called Clostridioides difficile (C. diff). This causes watery poop (diarrhea). Let your doctor know right away if:  · You have watery poop while taking an antibiotic.  · You have watery poop after you stop taking an antibiotic. The illness can happen weeks after you stop the medicine.  You also have a risk of getting an infection in the future that antibiotics cannot treat (antibiotic-resistant infection). This type of infection can be dangerous.  What else should I know about taking  antibiotics?    · You need to take the entire prescription.  ? Take the medicine for as long as told by your doctor.  ? Do not stop taking it even if you start to feel better.  · Try not to miss any doses. If you miss a dose, call your doctor.  · Birth control pills may not work. If you take birth control pills:  ? Keep on taking them.  ? Use a second form of birth control, such as a condom. Do this for as long as told by your doctor.  · Ask your doctor:  ? How long to wait in between doses.  ? If you should take the medicine with food.  ? If there is anything you should stay away from while taking the antibiotic, such as:  ? Food.  ? Drinks.  ? Medicines.  ? If there are any side effects you should watch for.  · Only take the medicines that your doctor told you to take. Do not take medicines that were given to someone else.  · Drink a large glass of water with the medicine.  · Ask the pharmacist for a tool to measure the medicine, such as:  ? A syringe.  ? A cup.  ? A spoon.  · Throw away any extra medicine.  Contact a doctor if:  · You get worse.  · You have new joint pain or muscle aches after starting the medicine.  · You have side effects from the medicine, such as:  ? Stomach pain.  ? Watery poop.  ? Feeling sick to your stomach (nausea).  Get help right away if:  · You have signs of a very bad allergic reaction. If this happens, stop taking the medicine right away. Signs may include:  ? Hives. These are raised, itchy, red bumps on the skin.  ? Skin rash.  ? Trouble breathing.  ? Wheezing.  ? Swelling.  ? Feeling dizzy.  ? Throwing up (vomiting).  · Your pee (urine) is dark, or is the color of blood.  · Your skin turns yellow.  · You bruise easily.  · You bleed easily.  · You have very bad watery poop and cramps in your belly.  · You have a very bad headache.  Summary  · Antibiotics are often used to treat infections caused by bacteria.  · Only take these medicines when needed.  · Let your doctor know if you  have watery poop while taking an antibiotic.  · You need to take the entire prescription.  This information is not intended to replace advice given to you by your health care provider. Make sure you discuss any questions you have with your health care provider.  Document Released: 09/26/2009 Document Revised: 01/24/2020 Document Reviewed: 12/20/2017  Elsevier Patient Education © 2020 [employer].

## 2020-10-04 NOTE — DISCHARGE SUMMARY
Discharge Summary    CHIEF COMPLAINT ON ADMISSION  Chief Complaint   Patient presents with   • Facial Pain     redness   • N/V       Reason for Admission  facial redness,      Admission Date  10/1/2020    CODE STATUS  Full Code    HPI & HOSPITAL COURSE  45 y.o. female with a past medical history of anxiety, rheumatoid arthritis, Crohn's disease, tobacco use who presented 10/1/2020 with worsening right-sided facial rash.  Patient has been having recurrent right-sided facial cellulitis she was recently prescribed Levaquin however developed itching and vomiting after few doses.  She reports associated right-sided ear pain that is severe 10 out of 10 no radiation to other places no relieving factors.  She also reports having associated chills but no shortness of breath or coughing.  She is not currently on immunosuppressive medications for her rheumatoid arthritis and Crohn's disease.     CT face did not show mastoiditis, but pt does have an otitis externa on examination.  I have been unable to evaluate her TM for otitis media due to debris in the canal, but Clinda for the facial cellulitis should cover most organisms if otitis media is present.  Her otic exam is significantly better today, and the facial cellulitis is completely resolved. She is uninsured and has no PCP, I have stressed the importance of obtaining a PCP so she can have ENT referral. We will provide her with resources at [facility].       Therefore, she is discharged in good and stable condition to home with close outpatient follow-up.    The patient met 2-midnight criteria for an inpatient stay at the time of discharge.    Discharge Date  10/4/20    FOLLOW UP ITEMS POST DISCHARGE  None    DISCHARGE DIAGNOSES  Active Problems:    Facial cellulitis with  otitis externa POA: Unknown    Tobacco dependence POA: Unknown    Crohn disease (HCC) POA: Yes      Overview: Not on treatment, no current symptoms        Resolved Problems:    Tachycardia POA: Unknown     Hypokalemia POA: Unknown      FOLLOW UP  No future appointments.        Please obtain a PCP ASAP for referral to ENT       MEDICATIONS ON DISCHARGE     Medication List      START taking these medications      Instructions   clindamycin 300 MG Caps  Commonly known as: CLEOCIN   Take 2 Caps by mouth 3 times a day for 7 days.  Dose: 600 mg     neomycin sulf/polymyx B sulf/HC soln 3.5-09050-6 Soln  Commonly known as: CORTISPORIN HC SOL   Place 4 Drops in right ear 4 times a day.  Dose: 4 Drop     oxyCODONE-acetaminophen 7.5-325 MG per tablet  Commonly known as: PERCOCET   Take 1 Tab by mouth every 6 hours as needed for up to 4 days.  Dose: 1 Tab        CONTINUE taking these medications      Instructions   QUEtiapine 50 MG tablet  Commonly known as: SEROquel   Take 1 Tab by mouth every bedtime.  Dose: 50 mg        STOP taking these medications    levoFLOXacin 500 MG tablet  Commonly known as: LEVAQUIN            Allergies  Allergies   Allergen Reactions   • Pcn [Penicillins] Anaphylaxis   • Toradol Hives     Migraine     • Tramadol Hives     migraine     • Bactrim [Sulfamethoxazole W-Trimethoprim] Itching     Face itching     • Stadol [Butorphanol] Unspecified     Pt reports it makes her feel like she can't wake up   • Blackberry [Rubus Fruticosus]      Oral swelling   • Fish Swelling   • Levaquin      Vomiting    • Tree Nuts Food Allergy        DIET  Orders Placed This Encounter   Procedures   • Diet Order     Standing Status:   Standing     Number of Occurrences:   1     Order Specific Question:   Diet:     Answer:   Regular [1]       ACTIVITY  As tolerated.  Weight bearing as tolerated    CONSULTATIONS  None    PROCEDURES  None    LABORATORY  Lab Results   Component Value Date    SODIUM 140 10/04/2020    POTASSIUM 4.1 10/04/2020    CHLORIDE 105 10/04/2020    CO2 22 10/04/2020    GLUCOSE 109 (H) 10/04/2020    BUN 8 10/04/2020    CREATININE 0.60 10/04/2020        Lab Results   Component Value Date    WBC 6.1 10/04/2020     HEMOGLOBIN 11.4 (L) 10/04/2020    HEMATOCRIT 34.6 (L) 10/04/2020    PLATELETCT 261 10/04/2020        Total time of the discharge process exceeds 32 minutes.

## 2020-10-04 NOTE — PROGRESS NOTES
Report received from KIRK Ji. Plan of care discussed. Patient resting comfortably in bed. Safety precautions in place.

## 2020-10-04 NOTE — PROGRESS NOTES
Pt discharged to home. Discharge instructions provided to pt. Pt verbalizes understanding. Pt states all questions have been answered. Signed copy in chart. Prescriptions sent to [facility] on [address]. Pt states that all personal belongings are in possession. Pt off unit via walkin, escorted by this RN.

## 2020-10-04 NOTE — PROGRESS NOTES
Assessment complete. Pt reports pain in the right side of face at 7/10, which she reports as improved following percocet.  at bedside. PIV infusing and flushes well. Safety precautions in place. Call light in reach.

## 2020-10-04 NOTE — CARE PLAN
Problem: Infection  Goal: Will remain free from infection  Outcome: PROGRESSING AS EXPECTED  Note: Pt educated on handwashing and hygiene. Standard precautions implemented. Assessed for s/s of infections. VS and labs monitored. Pt verbalized understanding of infection prevention care plan.       Problem: Pain Management  Goal: Pain level will decrease to patient's comfort goal  Outcome: PROGRESSING AS EXPECTED  Note: Pain assessment complete. Pt encouraged to report pain. Pain scale 1-10 in use. Pt medicated per MAR. Positioning, rest, and pillows used for comfort. Pt verbalized understanding of pain management care plan.

## 2020-10-04 NOTE — PROGRESS NOTES
Telemetry Shift Summary    Rhythm SR  HR Range 40s-80s  Ectopy one triplet  Measurements 0.18/0.08/0.40        Normal Values  Rhythm SR  HR Range    Measurements 0.12-0.20 / 0.06-0.10  / 0.30-0.52

## 2020-10-04 NOTE — PROGRESS NOTES
Report received from Ramiro BRADLEY. POC discussed. Pt ambulating hallways with . Steady gait observed. Safety precautions in place.

## 2020-10-06 LAB
BACTERIA BLD CULT: NORMAL
BACTERIA BLD CULT: NORMAL
SIGNIFICANT IND 70042: NORMAL
SIGNIFICANT IND 70042: NORMAL
SITE SITE: NORMAL
SITE SITE: NORMAL
SOURCE SOURCE: NORMAL
SOURCE SOURCE: NORMAL

## 2020-10-09 ENCOUNTER — HOSPITAL ENCOUNTER (EMERGENCY)
Facility: MEDICAL CENTER | Age: 45
End: 2020-10-09
Attending: EMERGENCY MEDICINE

## 2020-10-09 ENCOUNTER — APPOINTMENT (OUTPATIENT)
Dept: RADIOLOGY | Facility: MEDICAL CENTER | Age: 45
End: 2020-10-09
Attending: EMERGENCY MEDICINE

## 2020-10-09 VITALS
OXYGEN SATURATION: 97 % | BODY MASS INDEX: 23.62 KG/M2 | HEART RATE: 79 BPM | SYSTOLIC BLOOD PRESSURE: 121 MMHG | DIASTOLIC BLOOD PRESSURE: 81 MMHG | TEMPERATURE: 97.3 F | WEIGHT: 155.87 LBS | RESPIRATION RATE: 16 BRPM | HEIGHT: 68 IN

## 2020-10-09 DIAGNOSIS — L03.211 CELLULITIS OF FACE: ICD-10-CM

## 2020-10-09 LAB
ANION GAP SERPL CALC-SCNC: 13 MMOL/L (ref 7–16)
BASOPHILS # BLD AUTO: 0.6 % (ref 0–1.8)
BASOPHILS # BLD: 0.05 K/UL (ref 0–0.12)
BUN SERPL-MCNC: 8 MG/DL (ref 8–22)
CALCIUM SERPL-MCNC: 9.6 MG/DL (ref 8.4–10.2)
CHLORIDE SERPL-SCNC: 103 MMOL/L (ref 96–112)
CO2 SERPL-SCNC: 23 MMOL/L (ref 20–33)
CREAT SERPL-MCNC: 0.69 MG/DL (ref 0.5–1.4)
EOSINOPHIL # BLD AUTO: 0.31 K/UL (ref 0–0.51)
EOSINOPHIL NFR BLD: 3.5 % (ref 0–6.9)
ERYTHROCYTE [DISTWIDTH] IN BLOOD BY AUTOMATED COUNT: 43 FL (ref 35.9–50)
GLUCOSE SERPL-MCNC: 85 MG/DL (ref 65–99)
HCT VFR BLD AUTO: 38.7 % (ref 37–47)
HGB BLD-MCNC: 12.7 G/DL (ref 12–16)
IMM GRANULOCYTES # BLD AUTO: 0.02 K/UL (ref 0–0.11)
IMM GRANULOCYTES NFR BLD AUTO: 0.2 % (ref 0–0.9)
LYMPHOCYTES # BLD AUTO: 3.44 K/UL (ref 1–4.8)
LYMPHOCYTES NFR BLD: 38.9 % (ref 22–41)
MCH RBC QN AUTO: 26.9 PG (ref 27–33)
MCHC RBC AUTO-ENTMCNC: 32.8 G/DL (ref 33.6–35)
MCV RBC AUTO: 82 FL (ref 81.4–97.8)
MONOCYTES # BLD AUTO: 0.4 K/UL (ref 0–0.85)
MONOCYTES NFR BLD AUTO: 4.5 % (ref 0–13.4)
NEUTROPHILS # BLD AUTO: 4.63 K/UL (ref 2–7.15)
NEUTROPHILS NFR BLD: 52.3 % (ref 44–72)
NRBC # BLD AUTO: 0 K/UL
NRBC BLD-RTO: 0 /100 WBC
PLATELET # BLD AUTO: 292 K/UL (ref 164–446)
PMV BLD AUTO: 8.9 FL (ref 9–12.9)
POTASSIUM SERPL-SCNC: 4.2 MMOL/L (ref 3.6–5.5)
RBC # BLD AUTO: 4.72 M/UL (ref 4.2–5.4)
SODIUM SERPL-SCNC: 139 MMOL/L (ref 135–145)
WBC # BLD AUTO: 8.9 K/UL (ref 4.8–10.8)

## 2020-10-09 PROCEDURE — 700111 HCHG RX REV CODE 636 W/ 250 OVERRIDE (IP): Performed by: EMERGENCY MEDICINE

## 2020-10-09 PROCEDURE — 99284 EMERGENCY DEPT VISIT MOD MDM: CPT

## 2020-10-09 PROCEDURE — 96374 THER/PROPH/DIAG INJ IV PUSH: CPT

## 2020-10-09 PROCEDURE — 70481 CT ORBIT/EAR/FOSSA W/DYE: CPT

## 2020-10-09 PROCEDURE — 36415 COLL VENOUS BLD VENIPUNCTURE: CPT

## 2020-10-09 PROCEDURE — 700117 HCHG RX CONTRAST REV CODE 255: Performed by: EMERGENCY MEDICINE

## 2020-10-09 PROCEDURE — 85025 COMPLETE CBC W/AUTO DIFF WBC: CPT

## 2020-10-09 PROCEDURE — 80048 BASIC METABOLIC PNL TOTAL CA: CPT

## 2020-10-09 RX ORDER — QUETIAPINE FUMARATE 50 MG/1
50 TABLET, FILM COATED ORAL
Qty: 30 TAB | Refills: 0 | Status: ON HOLD | OUTPATIENT
Start: 2020-10-09 | End: 2021-04-09

## 2020-10-09 RX ORDER — CEPHALEXIN 500 MG/1
500 CAPSULE ORAL 4 TIMES DAILY
Qty: 28 CAP | Refills: 0 | Status: ON HOLD | OUTPATIENT
Start: 2020-10-09 | End: 2020-10-11

## 2020-10-09 RX ORDER — HYDROMORPHONE HYDROCHLORIDE 1 MG/ML
0.5 INJECTION, SOLUTION INTRAMUSCULAR; INTRAVENOUS; SUBCUTANEOUS ONCE
Status: COMPLETED | OUTPATIENT
Start: 2020-10-09 | End: 2020-10-09

## 2020-10-09 RX ADMIN — HYDROMORPHONE HYDROCHLORIDE 0.5 MG: 1 INJECTION, SOLUTION INTRAMUSCULAR; INTRAVENOUS; SUBCUTANEOUS at 18:15

## 2020-10-09 RX ADMIN — IOHEXOL 80 ML: 350 INJECTION, SOLUTION INTRAVENOUS at 18:17

## 2020-10-09 ASSESSMENT — FIBROSIS 4 INDEX: FIB4 SCORE: 0.67

## 2020-10-09 NOTE — ED TRIAGE NOTES
".  Chief Complaint   Patient presents with   • Wound Infection     for the past 13 years pt gets infections to R mandibular and behind the ear area. Pt gets abx usually about 14xs/year while admitted to the hospital. Pt has been admitted 12xs this year. redness noted to area and tender to touch. Pt moved from [place] 2 months ago.      ./73   Pulse 97   Temp 36.3 °C (97.3 °F) (Temporal)   Resp 16   Ht 1.727 m (5' 8\")   Wt 70.7 kg (155 lb 13.8 oz)   SpO2 98%     "

## 2020-10-09 NOTE — ED NOTES
Med Rec completed per patient   Allergies reviewed    Patient started her antibiotic but did not  her Ear drops because they were to expensive

## 2020-10-10 NOTE — ED PROVIDER NOTES
ED Provider Note    CHIEF COMPLAINT  Chief Complaint   Patient presents with   • Wound Infection     for the past 13 years pt gets infections to R mandibular and behind the ear area. Pt gets abx usually about 14xs/year while admitted to the hospital. Pt has been admitted 12xs this year. redness noted to area and tender to touch. Pt moved from [place] 2 months ago.        HPI  Charley Dunn is a 45 y.o. female who presents with right-sided facial swelling.  Patient states she has a history of chronic right-sided preauricular cellulitis.  She was put on clindamycin 6 days ago.  Patient has a history of Bactrim allergy.  Patient states she is taken p.o. Keflex prior with good results.  Patient denies fever or chills.  Patient denies cough or cold symptoms.  No vomiting or diarrhea    REVIEW OF SYSTEMS  See HPI for further details.  No fever no chills.  No cough or cold symptoms.  All other systems are negative.    PAST MEDICAL HISTORY  Past Medical History:   Diagnosis Date   • Anxiety    • Bradycardia    • Crohn's disease ([facility])    • MRSA (methicillin resistant Staphylococcus aureus)     to R face   • RA (rheumatoid arthritis) ([facility])        FAMILY HISTORY  History reviewed. No pertinent family history.    SOCIAL HISTORY  Social History     Socioeconomic History   • Marital status:      Spouse name: Not on file   • Number of children: Not on file   • Years of education: Not on file   • Highest education level: Not on file   Occupational History   • Not on file   Social Needs   • Financial resource strain: Not on file   • Food insecurity     Worry: Patient refused     Inability: Patient refused   • Transportation needs     Medical: No     Non-medical: No   Tobacco Use   • Smoking status: Current Every Day Smoker     Packs/day: 0.50     Years: 32.00     Pack years: 16.00     Types: Cigarettes   • Smokeless tobacco: Never Used   Substance and Sexual Activity   • Alcohol use: Not Currently     Comment: rare   •  "Drug use: Never   • Sexual activity: Not on file   Lifestyle   • Physical activity     Days per week: Not on file     Minutes per session: Not on file   • Stress: Not on file   Relationships   • Social connections     Talks on phone: Not on file     Gets together: Not on file     Attends Orthodox service: Not on file     Active member of club or organization: Not on file     Attends meetings of clubs or organizations: Not on file     Relationship status: Not on file   • Intimate partner violence     Fear of current or ex partner: Not on file     Emotionally abused: Not on file     Physically abused: Not on file     Forced sexual activity: Not on file   Other Topics Concern   • Not on file   Social History Narrative   • Not on file       SURGICAL HISTORY  Past Surgical History:   Procedure Laterality Date   • OTHER     • OTHER ABDOMINAL SURGERY         CURRENT MEDICATIONS  Home Medications     Reviewed by Nancy Cee (Pharmacy Tech) on 10/09/20 at 1643  Med List Status: Complete   Medication Last Dose Status   clindamycin (CLEOCIN) 300 MG Cap 10/9/2020 Active   neomycin sulf/polymyx B sulf/HC soln (CORTISPORIN HC SOL) 3.5-45150-8 Solution Not Taking Active   quetiapine (SEROQUEL) 50 MG tablet 10/8/2020 Active                ALLERGIES  Allergies   Allergen Reactions   • Pcn [Penicillins] Anaphylaxis   • Toradol Hives     Migraine     • Tramadol Hives     migraine     • Bactrim [Sulfamethoxazole W-Trimethoprim] Itching     Face itching     • Stadol [Butorphanol] Unspecified     Pt reports it makes her feel like she can't wake up   • Blackberry [Rubus Fruticosus]      Oral swelling   • Fish Swelling   • Levaquin      Vomiting    • Tree Nuts Food Allergy        PHYSICAL EXAM  VITAL SIGNS: /83   Pulse 91   Temp 36.3 °C (97.3 °F) (Temporal)   Resp 16   Ht 1.727 m (5' 8\")   Wt 70.7 kg (155 lb 13.8 oz)   SpO2 99%   BMI 23.70 kg/m²   Constitutional:  Well developed, Well nourished, anxious  HENT: " Right tympanic membrane appears to be intact without purulence.  There was some dried whitish discharge around the ear.  Mild swelling and tenderness to the right preauricular space as well as the tenderness over the right mastoid  Eyes: PERRLA, EOMI, Conjunctiva normal, No discharge.   Neck: Normal range of motion, No tenderness, Supple, No stridor.   Lymphatic: No lymphadenopathy noted.   Cardiovascular: Normal heart rate, Normal rhythm, No murmurs, No rubs, No gallops.   Thorax & Lungs: Normal breath sounds, No respiratory distress, No wheezing, No chest tenderness.   Skin: Warm, Dry, No erythema, No rash.   Extremities: No edema, No tenderness, No cyanosis, No clubbing. Dorsalis pedis pulses 2+ equal bilaterally. Radial pulses 2+ equal bilaterally  Neurologic: Alert & oriented x 3, Normal motor function, Normal sensory function, No focal deficits noted.   Psychiatric: Affect normal, Judgment normal, Mood normal.     RADIOLOGY/PROCEDURES  CT scan is unremarkable for mastoiditis or abscess    Results for orders placed or performed during the hospital encounter of 10/09/20   CBC WITH DIFFERENTIAL   Result Value Ref Range    WBC 8.9 4.8 - 10.8 K/uL    RBC 4.72 4.20 - 5.40 M/uL    Hemoglobin 12.7 12.0 - 16.0 g/dL    Hematocrit 38.7 37.0 - 47.0 %    MCV 82.0 81.4 - 97.8 fL    MCH 26.9 (L) 27.0 - 33.0 pg    MCHC 32.8 (L) 33.6 - 35.0 g/dL    RDW 43.0 35.9 - 50.0 fL    Platelet Count 292 164 - 446 K/uL    MPV 8.9 (L) 9.0 - 12.9 fL    Neutrophils-Polys 52.30 44.00 - 72.00 %    Lymphocytes 38.90 22.00 - 41.00 %    Monocytes 4.50 0.00 - 13.40 %    Eosinophils 3.50 0.00 - 6.90 %    Basophils 0.60 0.00 - 1.80 %    Immature Granulocytes 0.20 0.00 - 0.90 %    Nucleated RBC 0.00 /100 WBC    Neutrophils (Absolute) 4.63 2.00 - 7.15 K/uL    Lymphs (Absolute) 3.44 1.00 - 4.80 K/uL    Monos (Absolute) 0.40 0.00 - 0.85 K/uL    Eos (Absolute) 0.31 0.00 - 0.51 K/uL    Baso (Absolute) 0.05 0.00 - 0.12 K/uL    Immature Granulocytes (abs)  0.02 0.00 - 0.11 K/uL    NRBC (Absolute) 0.00 K/uL   BASIC METABOLIC PANEL   Result Value Ref Range    Sodium 139 135 - 145 mmol/L    Potassium 4.2 3.6 - 5.5 mmol/L    Chloride 103 96 - 112 mmol/L    Co2 23 20 - 33 mmol/L    Glucose 85 65 - 99 mg/dL    Bun 8 8 - 22 mg/dL    Creatinine 0.69 0.50 - 1.40 mg/dL    Calcium 9.6 8.4 - 10.2 mg/dL    Anion Gap 13.0 7.0 - 16.0   ESTIMATED GFR   Result Value Ref Range    GFR If African American >60 >60 mL/min/1.73 m 2    GFR If Non African American >60 >60 mL/min/1.73 m 2         COURSE & MEDICAL DECISION MAKING  Pertinent Labs & Imaging studies reviewed. (See chart for details)  Patient has worsening right-sided facial swelling.  She will be started up on Keflex and Cortisporin otic.  Patient was discharged home in stable condition.  She is certainly not toxic appearing.  I reviewed her [place] .  She is not filled any prescriptions for narcotics here in [place].    FINAL IMPRESSION  1.  Facial cellulitis  2.   3.        Electronically signed by: Edy Torres M.D., 10/9/2020 6:36 PM

## 2020-10-11 ENCOUNTER — HOSPITAL ENCOUNTER (EMERGENCY)
Facility: MEDICAL CENTER | Age: 45
End: 2020-10-11
Attending: EMERGENCY MEDICINE

## 2020-10-11 ENCOUNTER — HOSPITAL ENCOUNTER (INPATIENT)
Facility: MEDICAL CENTER | Age: 45
LOS: 3 days | DRG: 603 | End: 2020-10-14
Attending: INTERNAL MEDICINE | Admitting: INTERNAL MEDICINE

## 2020-10-11 VITALS
HEART RATE: 112 BPM | SYSTOLIC BLOOD PRESSURE: 116 MMHG | RESPIRATION RATE: 16 BRPM | HEIGHT: 68 IN | OXYGEN SATURATION: 99 % | BODY MASS INDEX: 23.29 KG/M2 | DIASTOLIC BLOOD PRESSURE: 90 MMHG | TEMPERATURE: 97.3 F | WEIGHT: 153.66 LBS

## 2020-10-11 DIAGNOSIS — L03.211 FACIAL CELLULITIS: ICD-10-CM

## 2020-10-11 DIAGNOSIS — H66.004 RECURRENT ACUTE SUPPURATIVE OTITIS MEDIA OF RIGHT EAR WITHOUT SPONTANEOUS RUPTURE OF TYMPANIC MEMBRANE: ICD-10-CM

## 2020-10-11 DIAGNOSIS — H70.91 MASTOIDITIS OF RIGHT SIDE: ICD-10-CM

## 2020-10-11 PROBLEM — H70.90 MASTOIDITIS: Status: ACTIVE | Noted: 2020-10-11

## 2020-10-11 LAB
ANION GAP SERPL CALC-SCNC: 13 MMOL/L (ref 7–16)
BASOPHILS # BLD AUTO: 0.5 % (ref 0–1.8)
BASOPHILS # BLD: 0.05 K/UL (ref 0–0.12)
BUN SERPL-MCNC: 8 MG/DL (ref 8–22)
CALCIUM SERPL-MCNC: 9.5 MG/DL (ref 8.4–10.2)
CHLORIDE SERPL-SCNC: 105 MMOL/L (ref 96–112)
CO2 SERPL-SCNC: 23 MMOL/L (ref 20–33)
COVID ORDER STATUS COVID19: NORMAL
CREAT SERPL-MCNC: 0.71 MG/DL (ref 0.5–1.4)
CRP SERPL HS-MCNC: 0.5 MG/DL (ref 0–0.75)
EOSINOPHIL # BLD AUTO: 0.33 K/UL (ref 0–0.51)
EOSINOPHIL NFR BLD: 3.1 % (ref 0–6.9)
ERYTHROCYTE [DISTWIDTH] IN BLOOD BY AUTOMATED COUNT: 47.2 FL (ref 35.9–50)
ERYTHROCYTE [SEDIMENTATION RATE] IN BLOOD BY WESTERGREN METHOD: 23 MM/HOUR (ref 0–20)
GLUCOSE SERPL-MCNC: 87 MG/DL (ref 65–99)
HCT VFR BLD AUTO: 41.6 % (ref 37–47)
HGB BLD-MCNC: 13 G/DL (ref 12–16)
IMM GRANULOCYTES # BLD AUTO: 0.03 K/UL (ref 0–0.11)
IMM GRANULOCYTES NFR BLD AUTO: 0.3 % (ref 0–0.9)
INR PPP: 0.99 (ref 0.87–1.13)
LACTATE BLD-SCNC: 1.1 MMOL/L (ref 0.5–2)
LYMPHOCYTES # BLD AUTO: 4.1 K/UL (ref 1–4.8)
LYMPHOCYTES NFR BLD: 38.9 % (ref 22–41)
MAGNESIUM SERPL-MCNC: 2.5 MG/DL (ref 1.5–2.5)
MCH RBC QN AUTO: 27.3 PG (ref 27–33)
MCHC RBC AUTO-ENTMCNC: 31.3 G/DL (ref 33.6–35)
MCV RBC AUTO: 87.2 FL (ref 81.4–97.8)
MONOCYTES # BLD AUTO: 0.48 K/UL (ref 0–0.85)
MONOCYTES NFR BLD AUTO: 4.5 % (ref 0–13.4)
NEUTROPHILS # BLD AUTO: 5.56 K/UL (ref 2–7.15)
NEUTROPHILS NFR BLD: 52.7 % (ref 44–72)
NRBC # BLD AUTO: 0 K/UL
NRBC BLD-RTO: 0 /100 WBC
PLATELET # BLD AUTO: 275 K/UL (ref 164–446)
PMV BLD AUTO: 9.1 FL (ref 9–12.9)
POTASSIUM SERPL-SCNC: 4.1 MMOL/L (ref 3.6–5.5)
PROTHROMBIN TIME: 12.8 SEC (ref 12–14.6)
RBC # BLD AUTO: 4.77 M/UL (ref 4.2–5.4)
SODIUM SERPL-SCNC: 141 MMOL/L (ref 135–145)
WBC # BLD AUTO: 10.6 K/UL (ref 4.8–10.8)

## 2020-10-11 PROCEDURE — 96376 TX/PRO/DX INJ SAME DRUG ADON: CPT

## 2020-10-11 PROCEDURE — 99285 EMERGENCY DEPT VISIT HI MDM: CPT

## 2020-10-11 PROCEDURE — 99223 1ST HOSP IP/OBS HIGH 75: CPT | Performed by: INTERNAL MEDICINE

## 2020-10-11 PROCEDURE — 87040 BLOOD CULTURE FOR BACTERIA: CPT

## 2020-10-11 PROCEDURE — 700102 HCHG RX REV CODE 250 W/ 637 OVERRIDE(OP): Performed by: INTERNAL MEDICINE

## 2020-10-11 PROCEDURE — 36415 COLL VENOUS BLD VENIPUNCTURE: CPT

## 2020-10-11 PROCEDURE — 96374 THER/PROPH/DIAG INJ IV PUSH: CPT

## 2020-10-11 PROCEDURE — 85025 COMPLETE CBC W/AUTO DIFF WBC: CPT

## 2020-10-11 PROCEDURE — 83605 ASSAY OF LACTIC ACID: CPT

## 2020-10-11 PROCEDURE — 700101 HCHG RX REV CODE 250

## 2020-10-11 PROCEDURE — C9803 HOPD COVID-19 SPEC COLLECT: HCPCS | Performed by: INTERNAL MEDICINE

## 2020-10-11 PROCEDURE — 700105 HCHG RX REV CODE 258: Performed by: EMERGENCY MEDICINE

## 2020-10-11 PROCEDURE — 700111 HCHG RX REV CODE 636 W/ 250 OVERRIDE (IP): Performed by: EMERGENCY MEDICINE

## 2020-10-11 PROCEDURE — A9270 NON-COVERED ITEM OR SERVICE: HCPCS | Performed by: INTERNAL MEDICINE

## 2020-10-11 PROCEDURE — 96375 TX/PRO/DX INJ NEW DRUG ADDON: CPT

## 2020-10-11 PROCEDURE — 80048 BASIC METABOLIC PNL TOTAL CA: CPT

## 2020-10-11 PROCEDURE — 700105 HCHG RX REV CODE 258

## 2020-10-11 PROCEDURE — 85652 RBC SED RATE AUTOMATED: CPT

## 2020-10-11 PROCEDURE — 700111 HCHG RX REV CODE 636 W/ 250 OVERRIDE (IP): Performed by: INTERNAL MEDICINE

## 2020-10-11 PROCEDURE — 85610 PROTHROMBIN TIME: CPT

## 2020-10-11 PROCEDURE — 770006 HCHG ROOM/CARE - MED/SURG/GYN SEMI*

## 2020-10-11 PROCEDURE — 83735 ASSAY OF MAGNESIUM: CPT

## 2020-10-11 PROCEDURE — U0003 INFECTIOUS AGENT DETECTION BY NUCLEIC ACID (DNA OR RNA); SEVERE ACUTE RESPIRATORY SYNDROME CORONAVIRUS 2 (SARS-COV-2) (CORONAVIRUS DISEASE [COVID-19]), AMPLIFIED PROBE TECHNIQUE, MAKING USE OF HIGH THROUGHPUT TECHNOLOGIES AS DESCRIBED BY CMS-2020-01-R: HCPCS

## 2020-10-11 PROCEDURE — 700111 HCHG RX REV CODE 636 W/ 250 OVERRIDE (IP)

## 2020-10-11 PROCEDURE — 86140 C-REACTIVE PROTEIN: CPT

## 2020-10-11 RX ORDER — PROCHLORPERAZINE EDISYLATE 5 MG/ML
5-10 INJECTION INTRAMUSCULAR; INTRAVENOUS EVERY 4 HOURS PRN
Status: DISCONTINUED | OUTPATIENT
Start: 2020-10-11 | End: 2020-10-14 | Stop reason: HOSPADM

## 2020-10-11 RX ORDER — CLONIDINE HYDROCHLORIDE 0.1 MG/1
0.1 TABLET ORAL EVERY 6 HOURS PRN
Status: DISCONTINUED | OUTPATIENT
Start: 2020-10-11 | End: 2020-10-14 | Stop reason: HOSPADM

## 2020-10-11 RX ORDER — ENALAPRILAT 1.25 MG/ML
1.25 INJECTION INTRAVENOUS EVERY 6 HOURS PRN
Status: DISCONTINUED | OUTPATIENT
Start: 2020-10-11 | End: 2020-10-14 | Stop reason: HOSPADM

## 2020-10-11 RX ORDER — QUETIAPINE FUMARATE 25 MG/1
50 TABLET, FILM COATED ORAL
Status: DISCONTINUED | OUTPATIENT
Start: 2020-10-11 | End: 2020-10-11 | Stop reason: HOSPADM

## 2020-10-11 RX ORDER — ENALAPRILAT 1.25 MG/ML
1.25 INJECTION INTRAVENOUS EVERY 6 HOURS PRN
Status: DISCONTINUED | OUTPATIENT
Start: 2020-10-11 | End: 2020-10-11 | Stop reason: HOSPADM

## 2020-10-11 RX ORDER — MORPHINE SULFATE 4 MG/ML
4 INJECTION, SOLUTION INTRAMUSCULAR; INTRAVENOUS ONCE
Status: COMPLETED | OUTPATIENT
Start: 2020-10-11 | End: 2020-10-11

## 2020-10-11 RX ORDER — ACETAMINOPHEN 325 MG/1
650 TABLET ORAL EVERY 6 HOURS PRN
Status: DISCONTINUED | OUTPATIENT
Start: 2020-10-11 | End: 2020-10-11 | Stop reason: HOSPADM

## 2020-10-11 RX ORDER — MORPHINE SULFATE 4 MG/ML
4 INJECTION, SOLUTION INTRAMUSCULAR; INTRAVENOUS ONCE
Status: CANCELLED | OUTPATIENT
Start: 2020-10-11 | End: 2020-10-12

## 2020-10-11 RX ORDER — PROCHLORPERAZINE EDISYLATE 5 MG/ML
5-10 INJECTION INTRAMUSCULAR; INTRAVENOUS EVERY 4 HOURS PRN
Status: CANCELLED | OUTPATIENT
Start: 2020-10-11

## 2020-10-11 RX ORDER — SODIUM CHLORIDE, SODIUM LACTATE, POTASSIUM CHLORIDE, AND CALCIUM CHLORIDE .6; .31; .03; .02 G/100ML; G/100ML; G/100ML; G/100ML
500 INJECTION, SOLUTION INTRAVENOUS
Status: CANCELLED | OUTPATIENT
Start: 2020-10-11

## 2020-10-11 RX ORDER — SODIUM CHLORIDE, SODIUM LACTATE, POTASSIUM CHLORIDE, AND CALCIUM CHLORIDE .6; .31; .03; .02 G/100ML; G/100ML; G/100ML; G/100ML
500 INJECTION, SOLUTION INTRAVENOUS
Status: DISCONTINUED | OUTPATIENT
Start: 2020-10-11 | End: 2020-10-14 | Stop reason: HOSPADM

## 2020-10-11 RX ORDER — SODIUM CHLORIDE, SODIUM LACTATE, POTASSIUM CHLORIDE, AND CALCIUM CHLORIDE .6; .31; .03; .02 G/100ML; G/100ML; G/100ML; G/100ML
30 INJECTION, SOLUTION INTRAVENOUS
Status: CANCELLED | OUTPATIENT
Start: 2020-10-11

## 2020-10-11 RX ORDER — PROMETHAZINE HYDROCHLORIDE 25 MG/1
12.5-25 SUPPOSITORY RECTAL EVERY 4 HOURS PRN
Status: DISCONTINUED | OUTPATIENT
Start: 2020-10-11 | End: 2020-10-14 | Stop reason: HOSPADM

## 2020-10-11 RX ORDER — OXYCODONE HYDROCHLORIDE 5 MG/1
10 TABLET ORAL
Status: DISCONTINUED | OUTPATIENT
Start: 2020-10-11 | End: 2020-10-11 | Stop reason: HOSPADM

## 2020-10-11 RX ORDER — ONDANSETRON 2 MG/ML
4 INJECTION INTRAMUSCULAR; INTRAVENOUS EVERY 4 HOURS PRN
Status: CANCELLED | OUTPATIENT
Start: 2020-10-11

## 2020-10-11 RX ORDER — PROMETHAZINE HYDROCHLORIDE 25 MG/1
12.5-25 TABLET ORAL EVERY 4 HOURS PRN
Status: DISCONTINUED | OUTPATIENT
Start: 2020-10-11 | End: 2020-10-14 | Stop reason: HOSPADM

## 2020-10-11 RX ORDER — ONDANSETRON 2 MG/ML
4 INJECTION INTRAMUSCULAR; INTRAVENOUS EVERY 4 HOURS PRN
Status: DISCONTINUED | OUTPATIENT
Start: 2020-10-11 | End: 2020-10-11 | Stop reason: HOSPADM

## 2020-10-11 RX ORDER — SODIUM CHLORIDE, SODIUM LACTATE, POTASSIUM CHLORIDE, AND CALCIUM CHLORIDE .6; .31; .03; .02 G/100ML; G/100ML; G/100ML; G/100ML
30 INJECTION, SOLUTION INTRAVENOUS
Status: DISCONTINUED | OUTPATIENT
Start: 2020-10-11 | End: 2020-10-14 | Stop reason: HOSPADM

## 2020-10-11 RX ORDER — CLONIDINE HYDROCHLORIDE 0.1 MG/1
0.1 TABLET ORAL EVERY 6 HOURS PRN
Status: CANCELLED | OUTPATIENT
Start: 2020-10-11

## 2020-10-11 RX ORDER — LABETALOL HYDROCHLORIDE 5 MG/ML
10 INJECTION, SOLUTION INTRAVENOUS EVERY 4 HOURS PRN
Status: DISCONTINUED | OUTPATIENT
Start: 2020-10-11 | End: 2020-10-11 | Stop reason: HOSPADM

## 2020-10-11 RX ORDER — SODIUM CHLORIDE, SODIUM LACTATE, POTASSIUM CHLORIDE, CALCIUM CHLORIDE 600; 310; 30; 20 MG/100ML; MG/100ML; MG/100ML; MG/100ML
INJECTION, SOLUTION INTRAVENOUS CONTINUOUS
Status: CANCELLED | OUTPATIENT
Start: 2020-10-11

## 2020-10-11 RX ORDER — POLYETHYLENE GLYCOL 3350 17 G/17G
1 POWDER, FOR SOLUTION ORAL
Status: CANCELLED | OUTPATIENT
Start: 2020-10-11

## 2020-10-11 RX ORDER — SODIUM CHLORIDE, SODIUM LACTATE, POTASSIUM CHLORIDE, AND CALCIUM CHLORIDE .6; .31; .03; .02 G/100ML; G/100ML; G/100ML; G/100ML
30 INJECTION, SOLUTION INTRAVENOUS
Status: DISCONTINUED | OUTPATIENT
Start: 2020-10-11 | End: 2020-10-11 | Stop reason: HOSPADM

## 2020-10-11 RX ORDER — SODIUM CHLORIDE 9 MG/ML
1000 INJECTION, SOLUTION INTRAVENOUS ONCE
Status: COMPLETED | OUTPATIENT
Start: 2020-10-11 | End: 2020-10-11

## 2020-10-11 RX ORDER — BISACODYL 10 MG
10 SUPPOSITORY, RECTAL RECTAL
Status: CANCELLED | OUTPATIENT
Start: 2020-10-11

## 2020-10-11 RX ORDER — CLONIDINE HYDROCHLORIDE 0.1 MG/1
0.1 TABLET ORAL EVERY 6 HOURS PRN
Status: DISCONTINUED | OUTPATIENT
Start: 2020-10-11 | End: 2020-10-11 | Stop reason: HOSPADM

## 2020-10-11 RX ORDER — ONDANSETRON 4 MG/1
4 TABLET, ORALLY DISINTEGRATING ORAL EVERY 4 HOURS PRN
Status: DISCONTINUED | OUTPATIENT
Start: 2020-10-11 | End: 2020-10-11 | Stop reason: HOSPADM

## 2020-10-11 RX ORDER — OXYCODONE HYDROCHLORIDE 10 MG/1
10 TABLET ORAL
Status: DISCONTINUED | OUTPATIENT
Start: 2020-10-11 | End: 2020-10-14 | Stop reason: HOSPADM

## 2020-10-11 RX ORDER — MORPHINE SULFATE 4 MG/ML
4 INJECTION, SOLUTION INTRAMUSCULAR; INTRAVENOUS
Status: DISCONTINUED | OUTPATIENT
Start: 2020-10-12 | End: 2020-10-14 | Stop reason: HOSPADM

## 2020-10-11 RX ORDER — QUETIAPINE FUMARATE 25 MG/1
50 TABLET, FILM COATED ORAL
Status: CANCELLED | OUTPATIENT
Start: 2020-10-11

## 2020-10-11 RX ORDER — PROMETHAZINE HYDROCHLORIDE 25 MG/1
12.5-25 SUPPOSITORY RECTAL EVERY 4 HOURS PRN
Status: CANCELLED | OUTPATIENT
Start: 2020-10-11

## 2020-10-11 RX ORDER — ONDANSETRON 2 MG/ML
4 INJECTION INTRAMUSCULAR; INTRAVENOUS EVERY 4 HOURS PRN
Status: DISCONTINUED | OUTPATIENT
Start: 2020-10-11 | End: 2020-10-14 | Stop reason: HOSPADM

## 2020-10-11 RX ORDER — MORPHINE SULFATE 4 MG/ML
4 INJECTION, SOLUTION INTRAMUSCULAR; INTRAVENOUS
Status: DISCONTINUED | OUTPATIENT
Start: 2020-10-11 | End: 2020-10-11 | Stop reason: HOSPADM

## 2020-10-11 RX ORDER — OXYCODONE HYDROCHLORIDE 5 MG/1
10 TABLET ORAL
Status: CANCELLED | OUTPATIENT
Start: 2020-10-11

## 2020-10-11 RX ORDER — MORPHINE SULFATE 4 MG/ML
4 INJECTION, SOLUTION INTRAMUSCULAR; INTRAVENOUS
Status: CANCELLED | OUTPATIENT
Start: 2020-10-11

## 2020-10-11 RX ORDER — OXYCODONE HYDROCHLORIDE 5 MG/1
5 TABLET ORAL
Status: DISCONTINUED | OUTPATIENT
Start: 2020-10-11 | End: 2020-10-11 | Stop reason: HOSPADM

## 2020-10-11 RX ORDER — ENALAPRILAT 1.25 MG/ML
1.25 INJECTION INTRAVENOUS EVERY 6 HOURS PRN
Status: CANCELLED | OUTPATIENT
Start: 2020-10-11

## 2020-10-11 RX ORDER — LABETALOL HYDROCHLORIDE 5 MG/ML
10 INJECTION, SOLUTION INTRAVENOUS EVERY 4 HOURS PRN
Status: CANCELLED | OUTPATIENT
Start: 2020-10-11

## 2020-10-11 RX ORDER — PROMETHAZINE HYDROCHLORIDE 25 MG/1
12.5-25 SUPPOSITORY RECTAL EVERY 4 HOURS PRN
Status: DISCONTINUED | OUTPATIENT
Start: 2020-10-11 | End: 2020-10-11 | Stop reason: HOSPADM

## 2020-10-11 RX ORDER — PROCHLORPERAZINE EDISYLATE 5 MG/ML
5-10 INJECTION INTRAMUSCULAR; INTRAVENOUS EVERY 4 HOURS PRN
Status: DISCONTINUED | OUTPATIENT
Start: 2020-10-11 | End: 2020-10-11 | Stop reason: HOSPADM

## 2020-10-11 RX ORDER — LABETALOL HYDROCHLORIDE 5 MG/ML
10 INJECTION, SOLUTION INTRAVENOUS EVERY 4 HOURS PRN
Status: DISCONTINUED | OUTPATIENT
Start: 2020-10-11 | End: 2020-10-14 | Stop reason: HOSPADM

## 2020-10-11 RX ORDER — BISACODYL 10 MG
10 SUPPOSITORY, RECTAL RECTAL
Status: DISCONTINUED | OUTPATIENT
Start: 2020-10-11 | End: 2020-10-11 | Stop reason: HOSPADM

## 2020-10-11 RX ORDER — ONDANSETRON 2 MG/ML
4 INJECTION INTRAMUSCULAR; INTRAVENOUS ONCE
Status: COMPLETED | OUTPATIENT
Start: 2020-10-11 | End: 2020-10-11

## 2020-10-11 RX ORDER — ACETAMINOPHEN 325 MG/1
650 TABLET ORAL EVERY 6 HOURS PRN
Status: DISCONTINUED | OUTPATIENT
Start: 2020-10-11 | End: 2020-10-14 | Stop reason: HOSPADM

## 2020-10-11 RX ORDER — POLYETHYLENE GLYCOL 3350 17 G/17G
1 POWDER, FOR SOLUTION ORAL
Status: DISCONTINUED | OUTPATIENT
Start: 2020-10-11 | End: 2020-10-14 | Stop reason: HOSPADM

## 2020-10-11 RX ORDER — QUETIAPINE FUMARATE 25 MG/1
50 TABLET, FILM COATED ORAL
Status: DISCONTINUED | OUTPATIENT
Start: 2020-10-11 | End: 2020-10-14 | Stop reason: HOSPADM

## 2020-10-11 RX ORDER — BISACODYL 10 MG
10 SUPPOSITORY, RECTAL RECTAL
Status: DISCONTINUED | OUTPATIENT
Start: 2020-10-11 | End: 2020-10-14 | Stop reason: HOSPADM

## 2020-10-11 RX ORDER — OXYCODONE HYDROCHLORIDE 5 MG/1
5 TABLET ORAL
Status: CANCELLED | OUTPATIENT
Start: 2020-10-11

## 2020-10-11 RX ORDER — ACETAMINOPHEN 325 MG/1
650 TABLET ORAL EVERY 6 HOURS PRN
Status: CANCELLED | OUTPATIENT
Start: 2020-10-11

## 2020-10-11 RX ORDER — SODIUM CHLORIDE, SODIUM LACTATE, POTASSIUM CHLORIDE, AND CALCIUM CHLORIDE .6; .31; .03; .02 G/100ML; G/100ML; G/100ML; G/100ML
500 INJECTION, SOLUTION INTRAVENOUS
Status: DISCONTINUED | OUTPATIENT
Start: 2020-10-11 | End: 2020-10-11 | Stop reason: HOSPADM

## 2020-10-11 RX ORDER — POLYETHYLENE GLYCOL 3350 17 G/17G
1 POWDER, FOR SOLUTION ORAL
Status: DISCONTINUED | OUTPATIENT
Start: 2020-10-11 | End: 2020-10-11 | Stop reason: HOSPADM

## 2020-10-11 RX ORDER — ONDANSETRON 4 MG/1
4 TABLET, ORALLY DISINTEGRATING ORAL EVERY 4 HOURS PRN
Status: DISCONTINUED | OUTPATIENT
Start: 2020-10-11 | End: 2020-10-14 | Stop reason: HOSPADM

## 2020-10-11 RX ORDER — AMOXICILLIN 250 MG
2 CAPSULE ORAL 2 TIMES DAILY
Status: DISCONTINUED | OUTPATIENT
Start: 2020-10-12 | End: 2020-10-14 | Stop reason: HOSPADM

## 2020-10-11 RX ORDER — SODIUM CHLORIDE, SODIUM LACTATE, POTASSIUM CHLORIDE, CALCIUM CHLORIDE 600; 310; 30; 20 MG/100ML; MG/100ML; MG/100ML; MG/100ML
INJECTION, SOLUTION INTRAVENOUS CONTINUOUS
Status: DISCONTINUED | OUTPATIENT
Start: 2020-10-11 | End: 2020-10-13

## 2020-10-11 RX ORDER — AMOXICILLIN 250 MG
2 CAPSULE ORAL 2 TIMES DAILY
Status: CANCELLED | OUTPATIENT
Start: 2020-10-11

## 2020-10-11 RX ORDER — MORPHINE SULFATE 4 MG/ML
4 INJECTION, SOLUTION INTRAMUSCULAR; INTRAVENOUS ONCE
Status: DISCONTINUED | OUTPATIENT
Start: 2020-10-11 | End: 2020-10-11

## 2020-10-11 RX ORDER — PROMETHAZINE HYDROCHLORIDE 25 MG/1
12.5-25 TABLET ORAL EVERY 4 HOURS PRN
Status: DISCONTINUED | OUTPATIENT
Start: 2020-10-11 | End: 2020-10-11 | Stop reason: HOSPADM

## 2020-10-11 RX ORDER — SODIUM CHLORIDE, SODIUM LACTATE, POTASSIUM CHLORIDE, CALCIUM CHLORIDE 600; 310; 30; 20 MG/100ML; MG/100ML; MG/100ML; MG/100ML
INJECTION, SOLUTION INTRAVENOUS CONTINUOUS
Status: DISCONTINUED | OUTPATIENT
Start: 2020-10-11 | End: 2020-10-11 | Stop reason: HOSPADM

## 2020-10-11 RX ORDER — AMOXICILLIN 250 MG
2 CAPSULE ORAL 2 TIMES DAILY
Status: DISCONTINUED | OUTPATIENT
Start: 2020-10-11 | End: 2020-10-11 | Stop reason: HOSPADM

## 2020-10-11 RX ORDER — OXYCODONE HYDROCHLORIDE 5 MG/1
5 TABLET ORAL
Status: DISCONTINUED | OUTPATIENT
Start: 2020-10-11 | End: 2020-10-14 | Stop reason: HOSPADM

## 2020-10-11 RX ORDER — ONDANSETRON 4 MG/1
4 TABLET, ORALLY DISINTEGRATING ORAL EVERY 4 HOURS PRN
Status: CANCELLED | OUTPATIENT
Start: 2020-10-11

## 2020-10-11 RX ORDER — PROMETHAZINE HYDROCHLORIDE 25 MG/1
12.5-25 TABLET ORAL EVERY 4 HOURS PRN
Status: CANCELLED | OUTPATIENT
Start: 2020-10-11

## 2020-10-11 RX ADMIN — MORPHINE SULFATE 4 MG: 4 INJECTION INTRAVENOUS at 19:35

## 2020-10-11 RX ADMIN — PROMETHAZINE HYDROCHLORIDE 25 MG: 25 TABLET ORAL at 23:52

## 2020-10-11 RX ADMIN — SODIUM CHLORIDE 1000 ML: 9 INJECTION, SOLUTION INTRAVENOUS at 18:29

## 2020-10-11 RX ADMIN — OXYCODONE HYDROCHLORIDE 10 MG: 10 TABLET ORAL at 23:05

## 2020-10-11 RX ADMIN — QUETIAPINE FUMARATE 50 MG: 25 TABLET ORAL at 23:51

## 2020-10-11 RX ADMIN — MORPHINE SULFATE 4 MG: 4 INJECTION INTRAVENOUS at 18:21

## 2020-10-11 RX ADMIN — VANCOMYCIN HYDROCHLORIDE 1750 MG: 500 INJECTION, POWDER, LYOPHILIZED, FOR SOLUTION INTRAVENOUS at 20:36

## 2020-10-11 RX ADMIN — ONDANSETRON 4 MG: 2 INJECTION INTRAMUSCULAR; INTRAVENOUS at 18:18

## 2020-10-11 RX ADMIN — MORPHINE SULFATE 4 MG: 4 INJECTION INTRAVENOUS at 23:53

## 2020-10-11 ASSESSMENT — COGNITIVE AND FUNCTIONAL STATUS - GENERAL
SUGGESTED CMS G CODE MODIFIER DAILY ACTIVITY: CH
SUGGESTED CMS G CODE MODIFIER MOBILITY: CH
MOBILITY SCORE: 24
DAILY ACTIVITIY SCORE: 24

## 2020-10-11 ASSESSMENT — LIFESTYLE VARIABLES
ALCOHOL_USE: NO
EVER FELT BAD OR GUILTY ABOUT YOUR DRINKING: NO
TOTAL SCORE: 0
HOW MANY TIMES IN THE PAST YEAR HAVE YOU HAD 5 OR MORE DRINKS IN A DAY: 0
ON A TYPICAL DAY WHEN YOU DRINK ALCOHOL HOW MANY DRINKS DO YOU HAVE: 0
TOTAL SCORE: 0
CONSUMPTION TOTAL: NEGATIVE
EVER HAD A DRINK FIRST THING IN THE MORNING TO STEADY YOUR NERVES TO GET RID OF A HANGOVER: NO
HAVE YOU EVER FELT YOU SHOULD CUT DOWN ON YOUR DRINKING: NO
AVERAGE NUMBER OF DAYS PER WEEK YOU HAVE A DRINK CONTAINING ALCOHOL: 0
DOES PATIENT WANT TO STOP DRINKING: NO
HAVE PEOPLE ANNOYED YOU BY CRITICIZING YOUR DRINKING: NO
TOTAL SCORE: 0

## 2020-10-11 ASSESSMENT — FIBROSIS 4 INDEX
FIB4 SCORE: 0.6
FIB4 SCORE: 0.64

## 2020-10-11 ASSESSMENT — PAIN DESCRIPTION - PAIN TYPE: TYPE: ACUTE PAIN

## 2020-10-11 NOTE — ED TRIAGE NOTES
C/O right sided facial rash with swelling.  Recurrent cellulitis x 13 years.  States today it is worse.

## 2020-10-12 PROBLEM — D64.9 NORMOCYTIC ANEMIA: Status: ACTIVE | Noted: 2020-10-12

## 2020-10-12 LAB
ANION GAP SERPL CALC-SCNC: 8 MMOL/L (ref 7–16)
BASOPHILS # BLD AUTO: 0.6 % (ref 0–1.8)
BASOPHILS # BLD: 0.04 K/UL (ref 0–0.12)
BUN SERPL-MCNC: 8 MG/DL (ref 8–22)
CALCIUM SERPL-MCNC: 8.5 MG/DL (ref 8.5–10.5)
CHLORIDE SERPL-SCNC: 108 MMOL/L (ref 96–112)
CO2 SERPL-SCNC: 25 MMOL/L (ref 20–33)
CREAT SERPL-MCNC: 0.63 MG/DL (ref 0.5–1.4)
CRP SERPL HS-MCNC: 0.48 MG/DL (ref 0–0.75)
CRP SERPL HS-MCNC: 4.2 MG/L (ref 0–7.5)
EOSINOPHIL # BLD AUTO: 0.26 K/UL (ref 0–0.51)
EOSINOPHIL NFR BLD: 3.7 % (ref 0–6.9)
ERYTHROCYTE [DISTWIDTH] IN BLOOD BY AUTOMATED COUNT: 46.7 FL (ref 35.9–50)
ERYTHROCYTE [SEDIMENTATION RATE] IN BLOOD BY WESTERGREN METHOD: 25 MM/HOUR (ref 0–20)
GLUCOSE SERPL-MCNC: 96 MG/DL (ref 65–99)
HCT VFR BLD AUTO: 35.3 % (ref 37–47)
HGB BLD-MCNC: 11.2 G/DL (ref 12–16)
IMM GRANULOCYTES # BLD AUTO: 0.02 K/UL (ref 0–0.11)
IMM GRANULOCYTES NFR BLD AUTO: 0.3 % (ref 0–0.9)
INR PPP: 1.01 (ref 0.87–1.13)
IRON SATN MFR SERPL: 15 % (ref 15–55)
IRON SERPL-MCNC: 51 UG/DL (ref 40–170)
LACTATE BLD-SCNC: 1.2 MMOL/L (ref 0.5–2)
LACTATE BLD-SCNC: 1.6 MMOL/L (ref 0.5–2)
LACTATE BLD-SCNC: 1.6 MMOL/L (ref 0.5–2)
LYMPHOCYTES # BLD AUTO: 3.09 K/UL (ref 1–4.8)
LYMPHOCYTES NFR BLD: 44 % (ref 22–41)
MAGNESIUM SERPL-MCNC: 2.2 MG/DL (ref 1.5–2.5)
MCH RBC QN AUTO: 27.7 PG (ref 27–33)
MCHC RBC AUTO-ENTMCNC: 31.7 G/DL (ref 33.6–35)
MCV RBC AUTO: 87.4 FL (ref 81.4–97.8)
MONOCYTES # BLD AUTO: 0.33 K/UL (ref 0–0.85)
MONOCYTES NFR BLD AUTO: 4.7 % (ref 0–13.4)
MRSA DNA SPEC QL NAA+PROBE: NORMAL
NEUTROPHILS # BLD AUTO: 3.28 K/UL (ref 2–7.15)
NEUTROPHILS NFR BLD: 46.7 % (ref 44–72)
NRBC # BLD AUTO: 0 K/UL
NRBC BLD-RTO: 0 /100 WBC
PLATELET # BLD AUTO: 237 K/UL (ref 164–446)
PMV BLD AUTO: 9.1 FL (ref 9–12.9)
POTASSIUM SERPL-SCNC: 3.5 MMOL/L (ref 3.6–5.5)
PROTHROMBIN TIME: 13.7 SEC (ref 12–14.6)
RBC # BLD AUTO: 4.04 M/UL (ref 4.2–5.4)
SARS-COV-2 RNA RESP QL NAA+PROBE: NOTDETECTED
SIGNIFICANT IND 70042: NORMAL
SITE SITE: NORMAL
SODIUM SERPL-SCNC: 141 MMOL/L (ref 135–145)
SOURCE SOURCE: NORMAL
SPECIMEN SOURCE: NORMAL
TIBC SERPL-MCNC: 342 UG/DL (ref 250–450)
UIBC SERPL-MCNC: 291 UG/DL (ref 110–370)
WBC # BLD AUTO: 7 K/UL (ref 4.8–10.8)

## 2020-10-12 PROCEDURE — 700102 HCHG RX REV CODE 250 W/ 637 OVERRIDE(OP): Performed by: INTERNAL MEDICINE

## 2020-10-12 PROCEDURE — 83550 IRON BINDING TEST: CPT

## 2020-10-12 PROCEDURE — A9270 NON-COVERED ITEM OR SERVICE: HCPCS | Performed by: INTERNAL MEDICINE

## 2020-10-12 PROCEDURE — 83540 ASSAY OF IRON: CPT

## 2020-10-12 PROCEDURE — 99233 SBSQ HOSP IP/OBS HIGH 50: CPT | Performed by: INTERNAL MEDICINE

## 2020-10-12 PROCEDURE — 85025 COMPLETE CBC W/AUTO DIFF WBC: CPT

## 2020-10-12 PROCEDURE — 85610 PROTHROMBIN TIME: CPT

## 2020-10-12 PROCEDURE — 80048 BASIC METABOLIC PNL TOTAL CA: CPT

## 2020-10-12 PROCEDURE — 770006 HCHG ROOM/CARE - MED/SURG/GYN SEMI*

## 2020-10-12 PROCEDURE — 700111 HCHG RX REV CODE 636 W/ 250 OVERRIDE (IP): Performed by: INTERNAL MEDICINE

## 2020-10-12 PROCEDURE — 86140 C-REACTIVE PROTEIN: CPT

## 2020-10-12 PROCEDURE — 85652 RBC SED RATE AUTOMATED: CPT

## 2020-10-12 PROCEDURE — 87641 MR-STAPH DNA AMP PROBE: CPT

## 2020-10-12 PROCEDURE — 36415 COLL VENOUS BLD VENIPUNCTURE: CPT

## 2020-10-12 PROCEDURE — 86141 C-REACTIVE PROTEIN HS: CPT

## 2020-10-12 PROCEDURE — 700105 HCHG RX REV CODE 258: Performed by: INTERNAL MEDICINE

## 2020-10-12 PROCEDURE — 83735 ASSAY OF MAGNESIUM: CPT

## 2020-10-12 PROCEDURE — 83605 ASSAY OF LACTIC ACID: CPT | Mod: 91

## 2020-10-12 RX ORDER — SODIUM CHLORIDE, SODIUM LACTATE, POTASSIUM CHLORIDE, CALCIUM CHLORIDE 600; 310; 30; 20 MG/100ML; MG/100ML; MG/100ML; MG/100ML
INJECTION, SOLUTION INTRAVENOUS
Status: ACTIVE
Start: 2020-10-12 | End: 2020-10-13

## 2020-10-12 RX ORDER — DIPHENHYDRAMINE HCL 25 MG
25 TABLET ORAL EVERY 6 HOURS PRN
Status: DISCONTINUED | OUTPATIENT
Start: 2020-10-12 | End: 2020-10-14 | Stop reason: HOSPADM

## 2020-10-12 RX ADMIN — OXYCODONE HYDROCHLORIDE 10 MG: 10 TABLET ORAL at 03:26

## 2020-10-12 RX ADMIN — CEFEPIME 2 G: 2 INJECTION, POWDER, FOR SOLUTION INTRAVENOUS at 12:09

## 2020-10-12 RX ADMIN — NEOMYCIN SULFATE, POLYMYXIN B SULFATE, HYDROCORTISONE 3 DROP: 3.5; 10000; 1 SOLUTION/ DROPS AURICULAR (OTIC) at 04:45

## 2020-10-12 RX ADMIN — OXYCODONE HYDROCHLORIDE 10 MG: 10 TABLET ORAL at 13:30

## 2020-10-12 RX ADMIN — MORPHINE SULFATE 4 MG: 4 INJECTION INTRAVENOUS at 04:41

## 2020-10-12 RX ADMIN — MORPHINE SULFATE 4 MG: 4 INJECTION INTRAVENOUS at 09:24

## 2020-10-12 RX ADMIN — VANCOMYCIN HYDROCHLORIDE 1250 MG: 500 INJECTION, POWDER, LYOPHILIZED, FOR SOLUTION INTRAVENOUS at 08:12

## 2020-10-12 RX ADMIN — DIPHENHYDRAMINE HYDROCHLORIDE 25 MG: 25 TABLET ORAL at 22:05

## 2020-10-12 RX ADMIN — SODIUM CHLORIDE, POTASSIUM CHLORIDE, SODIUM LACTATE AND CALCIUM CHLORIDE: 600; 310; 30; 20 INJECTION, SOLUTION INTRAVENOUS at 12:10

## 2020-10-12 RX ADMIN — PROMETHAZINE HYDROCHLORIDE 25 MG: 25 TABLET ORAL at 20:41

## 2020-10-12 RX ADMIN — ENOXAPARIN SODIUM 40 MG: 40 INJECTION SUBCUTANEOUS at 04:41

## 2020-10-12 RX ADMIN — OXYCODONE HYDROCHLORIDE 10 MG: 10 TABLET ORAL at 08:11

## 2020-10-12 RX ADMIN — CEFEPIME 2 G: 2 INJECTION, POWDER, FOR SOLUTION INTRAVENOUS at 01:31

## 2020-10-12 RX ADMIN — OXYCODONE HYDROCHLORIDE 10 MG: 10 TABLET ORAL at 16:56

## 2020-10-12 RX ADMIN — MORPHINE SULFATE 4 MG: 4 INJECTION INTRAVENOUS at 14:28

## 2020-10-12 RX ADMIN — OXYCODONE HYDROCHLORIDE 10 MG: 10 TABLET ORAL at 21:58

## 2020-10-12 RX ADMIN — MORPHINE SULFATE 4 MG: 4 INJECTION INTRAVENOUS at 18:34

## 2020-10-12 RX ADMIN — QUETIAPINE FUMARATE 50 MG: 25 TABLET ORAL at 20:38

## 2020-10-12 RX ADMIN — NEOMYCIN SULFATE, POLYMYXIN B SULFATE, HYDROCORTISONE 3 DROP: 3.5; 10000; 1 SOLUTION/ DROPS AURICULAR (OTIC) at 12:08

## 2020-10-12 RX ADMIN — SODIUM CHLORIDE, POTASSIUM CHLORIDE, SODIUM LACTATE AND CALCIUM CHLORIDE: 600; 310; 30; 20 INJECTION, SOLUTION INTRAVENOUS at 01:30

## 2020-10-12 RX ADMIN — NEOMYCIN SULFATE, POLYMYXIN B SULFATE, HYDROCORTISONE 3 DROP: 3.5; 10000; 1 SOLUTION/ DROPS AURICULAR (OTIC) at 16:51

## 2020-10-12 ASSESSMENT — ENCOUNTER SYMPTOMS
MUSCULOSKELETAL NEGATIVE: 1
WEAKNESS: 0
FEVER: 0
SHORTNESS OF BREATH: 0
VOMITING: 0
CHILLS: 0
COUGH: 0
ABDOMINAL PAIN: 0
NAUSEA: 0
BLURRED VISION: 0
FOCAL WEAKNESS: 0

## 2020-10-12 ASSESSMENT — PAIN DESCRIPTION - PAIN TYPE
TYPE: ACUTE PAIN

## 2020-10-12 ASSESSMENT — COPD QUESTIONNAIRES
COPD SCREENING SCORE: 4
HAVE YOU SMOKED AT LEAST 100 CIGARETTES IN YOUR ENTIRE LIFE: YES
DURING THE PAST 4 WEEKS HOW MUCH DID YOU FEEL SHORT OF BREATH: SOME OF THE TIME
DO YOU EVER COUGH UP ANY MUCUS OR PHLEGM?: NO/ONLY WITH OCCASIONAL COLDS OR INFECTIONS

## 2020-10-12 NOTE — FLOWSHEET NOTE
"Contacted by patient's nurse at [facility] room T4 24.      Noted admission diagnosis of mild facial cellulitis and right-sided recurrent suppurative otitis media.   Failing outpatient clindamycin she is started on vancomycin and cefepime empirically.  This patient seen and examined at bedside requesting IV morphine for intractable migraine headache.  Patient states she takes ibuprofen at home.  Patient educated that morphine is no longer indicated for migraine headache Prompting  her reply  \"that's okay I'll be gone in the morning!\"  Patient accepted for ENT consult and evaluation.          "

## 2020-10-12 NOTE — H&P
Hospital Medicine History & Physical Note    Date of Service  10/11/2020    Primary Care Physician  No primary care provider on file.    Consultants  ENT    Code Status  Full Code    Chief Complaint  Chief Complaint   Patient presents with   • Ear Pain   • Facial Pain       History of Presenting Illness  This is a 45 y.o. female with Crohn's disease, rheumatoid arthritis, with recurrent right ear infection, who initially presented to the hospital on 10/1/2020 with worsening right-sided facial redness, and ear pain.  CT scan exam showed no mastoiditis but with evidence of otitis externa.  She was admitted at that time, and was treated with IV antibiotics, and with clinical improvement was discharged on clindamycin.  However 2 days ago, she started to notice that the redness surrounding her right ear has worsened, with increased pain which she rated 9 out of 10 in severity.  She described the pain as pressure inside the ear, and stabbing pain around the ear and been causing her to have migraine headaches.  She also stated that she could not hear from the right ear.  She went to the ED, and CT scan was repeated which showed left temporal bone cirrhotic changes and there is multiple opacified mastoid air cells similar to 8/28, most consistent with apical inflammation and muscle disease, with periauricular cellulitis and mild pericarditis with no evidence of abscess.  She was sent home, and Keflex was added to her antibiotic regimen.  However her symptoms progressively worsened, and she started to feel feverish, cold and chilly.  She also had slight nausea with no vomiting.  She generally did not feel well.  She had no other complaint such as chest pain, shortness of breath, abdominal pain, cough.  She does have loose non-watery bowel movements since she was started on antibiotics.  She then decided to go back to the ED for further evaluation.    ED course:  The patient was initially evaluated.  Vital signs were stable.   She was mildly tachycardic.  Initial blood work-up showed no leukocytosis, with normal electrolytes and renal function.  She was felt to have been failing outpatient antibiotics.  Fortunately he is not septic.  She was given vancomycin, along with normal saline and morphine.  ENT consult was felt to be necessary, and as they do not come to [facility], she was being directly admitted to [facility] related further evaluation and management.    Review of Systems  ROS     Pertinent positives/negatives as mentioned above.     A complete review of systems was personally done by me. All other systems were negative.       Past Medical History   has a past medical history of Anxiety, Bradycardia, Crohn's disease ([facility]), MRSA (methicillin resistant Staphylococcus aureus), and RA (rheumatoid arthritis) ([facility]).    Surgical History   has a past surgical history that includes other abdominal surgery and other.     Family History  Reviewed and not pertinent.       Social History   reports that she has been smoking cigarettes. She has a 16.00 pack-year smoking history. She has never used smokeless tobacco. She reports previous alcohol use. She reports that she does not use drugs.    Allergies  Allergies   Allergen Reactions   • Pcn [Penicillins] Anaphylaxis   • Toradol Hives     Migraine     • Tramadol Hives     migraine     • Bactrim [Sulfamethoxazole W-Trimethoprim] Itching     Face itching     • Stadol [Butorphanol] Unspecified     Pt reports it makes her feel like she can't wake up   • Blackberry [Rubus Fruticosus]      Oral swelling   • Fish Swelling   • Levaquin      Vomiting    • Tree Nuts Food Allergy        Medications  Prior to Admission Medications   Prescriptions Last Dose Informant Patient Reported? Taking?   QUEtiapine (SEROQUEL) 50 MG tablet   No No   Sig: Take 1 Tab by mouth every bedtime.   cephALEXin (KEFLEX) 500 MG Cap   No No   Sig: Take 1 Cap by mouth 4 times a day for 7 days.   clindamycin  (CLEOCIN) 300 MG Cap  Patient No No   Sig: Take 2 Caps by mouth 3 times a day for 7 days.   neomycin sulf/polymyx B sulf/HC soln (CORTISPORIN HC SOL) 3.5-17666-5 Solution   No No   Sig: Place 3 Drops in right ear 4 times a day. Administer drops to both ears.      Facility-Administered Medications: None       Physical Exam  Temp:  [36.3 °C (97.3 °F)] 36.3 °C (97.3 °F)  Pulse:  [112] 112  Resp:  [16] 16  BP: (116)/(90) 116/90  SpO2:  [99 %] 99 %    Physical Exam  Vitals signs reviewed.   Constitutional:       General: She is not in acute distress.     Appearance: Normal appearance. She is normal weight. She is not ill-appearing or diaphoretic.   HENT:      Head: Normocephalic and atraumatic.      Ears:      Comments: Preauricular skin, and right ear pinna redness  Pain with pulling the right ear pinna  Opacified right TM     Mouth/Throat:      Mouth: Mucous membranes are moist.      Pharynx: No oropharyngeal exudate or posterior oropharyngeal erythema.   Eyes:      General: No scleral icterus.     Extraocular Movements: Extraocular movements intact.      Conjunctiva/sclera: Conjunctivae normal.      Pupils: Pupils are equal, round, and reactive to light.   Neck:      Musculoskeletal: Normal range of motion and neck supple. No neck rigidity or muscular tenderness.   Cardiovascular:      Rate and Rhythm: Regular rhythm. Tachycardia present.      Heart sounds: Normal heart sounds. No murmur.   Pulmonary:      Effort: Pulmonary effort is normal. No respiratory distress.      Breath sounds: Normal breath sounds. No stridor. No wheezing, rhonchi or rales.   Chest:      Chest wall: No tenderness.   Abdominal:      General: Bowel sounds are normal. There is no distension.      Palpations: Abdomen is soft. There is no mass.      Tenderness: There is no abdominal tenderness. There is no guarding or rebound.   Musculoskeletal: Normal range of motion.         General: No swelling.      Right lower leg: No edema.      Left lower  leg: No edema.   Lymphadenopathy:      Cervical: No cervical adenopathy.   Skin:     General: Skin is warm and dry.      Coloration: Skin is not jaundiced.      Findings: No rash.   Neurological:      General: No focal deficit present.      Mental Status: She is alert and oriented to person, place, and time. Mental status is at baseline.      Cranial Nerves: No cranial nerve deficit.   Psychiatric:         Mood and Affect: Mood normal.         Behavior: Behavior normal.         Thought Content: Thought content normal.         Judgment: Judgment normal.         Laboratory:  Recent Labs     10/09/20  1730 10/11/20  1909   WBC 8.9 10.6   RBC 4.72 4.77   HEMOGLOBIN 12.7 13.0   HEMATOCRIT 38.7 41.6   MCV 82.0 87.2   MCH 26.9* 27.3   MCHC 32.8* 31.3*   RDW 43.0 47.2   PLATELETCT 292 275   MPV 8.9* 9.1     Recent Labs     10/09/20  1730 10/11/20  1830   SODIUM 139 141   POTASSIUM 4.2 4.1   CHLORIDE 103 105   CO2 23 23   GLUCOSE 85 87   BUN 8 8   CREATININE 0.69 0.71   CALCIUM 9.6 9.5     Recent Labs     10/09/20  1730 10/11/20  1830   GLUCOSE 85 87     Recent Labs     10/11/20  1830   INR 0.99     No results for input(s): NTPROBNP in the last 72 hours.      No results for input(s): TROPONINT in the last 72 hours.    Imaging:  No orders to display         Assessment/Plan:  I anticipate this patient will require at least two midnights for appropriate medical management, necessitating inpatient admission.    * Otitis externa, preauricular cellulitis with parotiditis, otitis media with mastoiditis, failed outpatient antibiotics- (present on admission)  Assessment & Plan  -Failed several oral antibiotics.  CT scan on 10/9/2020 showing left temporal bone sclerotic changes, and multiple opacified mastoid air cells, ?temporal osteomyelitis, mastoiditis.  CT also showed periauricular cellulitis and mild parotiditis with no abscess.  -She will need a formal ENT evaluation, given that she has failed several rounds of antibiotics  please will need to be done sooner rather than later. Consult ENT in the morning.   -For now, I will cover her broadly with IV cefepime (to cover gram negatives), along with IV vancomycin (to cover gram positives, MRSA).  Check ESR and CRP.  -Pain control with IV morphine, and oral oxycodone.    Crohn disease (HCC)- (present on admission)  Assessment & Plan  -She is currently not on any treatment, and no current symptoms.    Rheumatoid arthritis (HCC)- (present on admission)  Assessment & Plan  -Not in acute flare.  Outpatient follow-up.      DVT prophylaxis: lovenox SQ

## 2020-10-12 NOTE — ASSESSMENT & PLAN NOTE
Hemoglobin 11, MCV 87  No bleeding  Does have recurrent infections so may just be inflammatory of chronic inflammation  Will check iron studies  CTM

## 2020-10-12 NOTE — PROGRESS NOTES
"Pharmacy Kinetics 45 y.o. female on vancomycin day # 1 10/11/2020    Currently on Vancomycin 1750 mg iv x loading dose  Provider specified end date: TBD (7 days ordered)    Indication for Treatment: SSTI    Pertinent history per medical record: Admitted on 10/11/2020 for worsening ear pain and redness despite outpatient clindamycin and cephalexin. Repeat CT scan in ER showed left temporal bone cirrhotic changes and there is multiple opacified mastoid air cells similar to , most consistent with apical inflammation and muscle disease, with periauricular cellulitis and mild pericarditis with no evidence of abscess.     Other antibiotics: cefepime    Allergies: Pcn [penicillins], Toradol, Tramadol, Bactrim [sulfamethoxazole w-trimethoprim], Stadol [butorphanol], Blackberry [rubus fruticosus], Fish, Levaquin, and Tree nuts food allergy     List concerns for renal function: none noted    Pertinent cultures to date:   None from this encounter    MRSA nares swab if pneumonia is a concern (ordered/positive/negative/n-a): n/a    Recent Labs     10/09/20  1730 10/11/20  1909   WBC 8.9 10.6   NEUTSPOLYS 52.30 52.70     Recent Labs     10/09/20  1730 10/11/20  1830   BUN 8 8   CREATININE 0.69 0.71     No results for input(s): VANCOTROUGH, VANCOPEAK, VANCORANDOM in the last 72 hours.No intake or output data in the 24 hours ending 10/12/20 0017   /82   Pulse 80   Temp 36.5 °C (97.7 °F) (Temporal)   Resp 16   Ht 1.727 m (5' 8\")   Wt 70.5 kg (155 lb 6.8 oz)   SpO2 99%  Temp (24hrs), Av.5 °C (97.7 °F), Min:36.5 °C (97.7 °F), Max:36.5 °C (97.7 °F)      A/P   1. Vancomycin dose change: start 17 mg/kg IV q12h after load  2. Next vancomycin level: Prior to 3 or 4th dose (not yet ordered)  3. Goal trough: 10-15 mcg/mL  4. Comments: Broad abx ordered empirically for severe ear infection, ENT consulted. Rounding clinical pharmacist will order a trough near steady state to assess dose if vanco continues. EDELMIRA Ferris " Yuliana, PharmD, BCOP

## 2020-10-12 NOTE — ASSESSMENT & PLAN NOTE
-Failed several oral antibiotics.  CT scan on 10/9/2020 showing left temporal bone sclerotic changes, and multiple opacified mastoid air cells, ?temporal osteomyelitis, mastoiditis.  CT also showed periauricular cellulitis and mild parotiditis with no abscess.  -She will need a formal ENT evaluation, given that she has failed several rounds of antibiotics please will need to be done sooner rather than later. Consult ENT in the morning.   -For now, I will cover her broadly with IV cefepime (to cover gram negatives), along with IV vancomycin (to cover gram positives, MRSA).  Check ESR and CRP.  -Pain control with IV morphine, and oral oxycodone.

## 2020-10-12 NOTE — PROGRESS NOTES
2 RN skin check complete with Viktoria RN    PIV right wrist  All bony prominences in tact.  Facial swelling noted to right side of face  Healed lap sites to abdomen  Redness/ excoriation noted under bilateral breast. Inter dry applied  No other skin issues noted

## 2020-10-12 NOTE — CARE PLAN
Problem: Communication  Goal: The ability to communicate needs accurately and effectively will improve  Outcome: PROGRESSING AS EXPECTED   Updated on POC  Problem: Safety  Goal: Will remain free from injury  Outcome: PROGRESSING AS EXPECTED   Educated on use of call light   Problem: Pain Management  Goal: Pain level will decrease to patient's comfort goal  Outcome: PROGRESSING SLOWER THAN EXPECTED   Pain managed with PRN oxycodone and morphine

## 2020-10-12 NOTE — ED PROVIDER NOTES
ED Provider Note    CHIEF COMPLAINT  Chief Complaint   Patient presents with   • Ear Pain   • Facial Pain       HPI  Charley Dunn is a 45 y.o. female who presents increasing pain and some swelling around the right ear.  Patient was recently admitted at the first part of this month for cellulitis around her right ear.  CT at that time did not show mastoiditis.  Patient was treated with IV antibiotics and discharged on clindamycin.  Patient return to the emergency department here 2 days ago still on clindamycin.  With increasing redness Keflex was added to her antibiotic regiment.  Patient states that the pain is increasing.  Now has increasing swelling.  She states that she has been using the eardrops as well.  She does drainage from the right ear as well she states.  She states that she has not had a fever, chills, chest pain, shortness of breath, cough.  She does think that she now has some pain over her maxillary sinuses bilaterally.  No pain or tenderness to the left ear.  She states that she has had this on and off for 14 years.  She just recently moved to the area and has not been able to follow-up with anything 18..    REVIEW OF SYSTEMS  Pertinent positives include right ear pain, drainage, redness around the area. Pertinent negatives include fever, chills, chest pain, cough, nausea, vomiting.    PAST MEDICAL HISTORY   has a past medical history of Anxiety, Bradycardia, Crohn's disease ([facility]), MRSA (methicillin resistant Staphylococcus aureus), and RA (rheumatoid arthritis) ([facility]).    SOCIAL HISTORY  Social History     Tobacco Use   • Smoking status: Current Every Day Smoker     Packs/day: 0.50     Years: 32.00     Pack years: 16.00     Types: Cigarettes   • Smokeless tobacco: Never Used   Substance and Sexual Activity   • Alcohol use: Not Currently     Comment: rare   • Drug use: Never   • Sexual activity: Not on file       SURGICAL HISTORY   has a past surgical history that includes other abdominal  "surgery and other.    CURRENT MEDICATIONS  Home Medications    **Home medications have not yet been reviewed for this encounter**         ALLERGIES  Allergies   Allergen Reactions   • Pcn [Penicillins] Anaphylaxis   • Toradol Hives     Migraine     • Tramadol Hives     migraine     • Bactrim [Sulfamethoxazole W-Trimethoprim] Itching     Face itching     • Stadol [Butorphanol] Unspecified     Pt reports it makes her feel like she can't wake up   • Blackberry [Rubus Fruticosus]      Oral swelling   • Fish Swelling   • Levaquin      Vomiting    • Tree Nuts Food Allergy        PHYSICAL EXAM  VITAL SIGNS: /90   Pulse (!) 112   Temp 36.3 °C (97.3 °F) (Temporal)   Resp 16   Ht 1.727 m (5' 8\")   Wt 69.7 kg (153 lb 10.6 oz)   SpO2 99%   BMI 23.36 kg/m²   Constitutional: Well developed, Well nourished, mild distress.   HENT: Normocephalic, Atraumatic, patient has erythema and warmth with increased pain and swelling inferior and anterior to the left ear.  Does not seem to extend onto the auricle.  Right TM is opacified with no erythema.  The canal appears normal.  She has oropharynx moist, No oral exudates.   Eyes: Conjunctiva normal, No discharge.   Neck: Supple, No stridor, mild tender lymphadenopathy on the right side, no fluctuance  Cardiovascular: Normal heart rate, Normal rhythm, No murmurs, equal pulses.   Pulmonary: Normal breath sounds, No respiratory distress, No wheezing, No rales, No rhonchi.  Skin: Warm, Dry, erythema around the right ear as described above. no rash.   Neurologic: Alert & oriented x 3, Normal motor function,  No focal deficits noted.   Psychiatric: Affect normal, Judgment normal, Mood normal.     RADIOLOGY/PROCEDURES  No orders to display       Laboratory tests  Results for orders placed or performed during the hospital encounter of 10/11/20   BASIC METABOLIC PANEL   Result Value Ref Range    Sodium 141 135 - 145 mmol/L    Potassium 4.1 3.6 - 5.5 mmol/L    Chloride 105 96 - 112 mmol/L "    Co2 23 20 - 33 mmol/L    Glucose 87 65 - 99 mg/dL    Bun 8 8 - 22 mg/dL    Creatinine 0.71 0.50 - 1.40 mg/dL    Calcium 9.5 8.4 - 10.2 mg/dL    Anion Gap 13.0 7.0 - 16.0   CBC WITH DIFFERENTIAL   Result Value Ref Range    WBC 10.6 4.8 - 10.8 K/uL    RBC 4.77 4.20 - 5.40 M/uL    Hemoglobin 13.0 12.0 - 16.0 g/dL    Hematocrit 41.6 37.0 - 47.0 %    MCV 87.2 81.4 - 97.8 fL    MCH 27.3 27.0 - 33.0 pg    MCHC 31.3 (L) 33.6 - 35.0 g/dL    RDW 47.2 35.9 - 50.0 fL    Platelet Count 275 164 - 446 K/uL    MPV 9.1 9.0 - 12.9 fL    Neutrophils-Polys 52.70 44.00 - 72.00 %    Lymphocytes 38.90 22.00 - 41.00 %    Monocytes 4.50 0.00 - 13.40 %    Eosinophils 3.10 0.00 - 6.90 %    Basophils 0.50 0.00 - 1.80 %    Immature Granulocytes 0.30 0.00 - 0.90 %    Nucleated RBC 0.00 /100 WBC    Neutrophils (Absolute) 5.56 2.00 - 7.15 K/uL    Lymphs (Absolute) 4.10 1.00 - 4.80 K/uL    Monos (Absolute) 0.48 0.00 - 0.85 K/uL    Eos (Absolute) 0.33 0.00 - 0.51 K/uL    Baso (Absolute) 0.05 0.00 - 0.12 K/uL    Immature Granulocytes (abs) 0.03 0.00 - 0.11 K/uL    NRBC (Absolute) 0.00 K/uL   ESTIMATED GFR   Result Value Ref Range    GFR If African American >60 >60 mL/min/1.73 m 2    GFR If Non African American >60 >60 mL/min/1.73 m 2   Sed Rate   Result Value Ref Range    Sed Rate Westergren 23 (H) 0 - 20 mm/hour   CRP QUANTITIVE (NON-CARDIAC)   Result Value Ref Range    Stat C-Reactive Protein 0.50 0.00 - 0.75 mg/dL   MAGNESIUM   Result Value Ref Range    Magnesium 2.5 1.5 - 2.5 mg/dL   Prothrombin Time   Result Value Ref Range    PT 12.8 12.0 - 14.6 sec    INR 0.99 0.87 - 1.13         Medications given in the ER  Medications   vancomycin (VANCOCIN) 1,750 mg in  mL IVPB (has no administration in time range)   ceFEPIme (MAXIPIME) 1 g in  mL IVPB (has no administration in time range)   MD Alert...Vancomycin per Pharmacy (has no administration in time range)   neomycin sulf/polymyx B sulf/HC soln (CORTISPORIN HC SOL) 3.5-84657-1 otic  solution 3 Drop (has no administration in time range)   QUEtiapine (Seroquel) tablet 50 mg (has no administration in time range)   Respiratory Therapy Consult (has no administration in time range)   acetaminophen (TYLENOL) tablet 650 mg (has no administration in time range)   cloNIDine (CATAPRES) tablet 0.1 mg (has no administration in time range)   enalaprilat (VASOTEC) injection 1.25 mg (has no administration in time range)   labetalol (NORMODYNE/TRANDATE) injection 10 mg (has no administration in time range)   ondansetron (ZOFRAN) syringe/vial injection 4 mg (has no administration in time range)   ondansetron (ZOFRAN ODT) dispertab 4 mg (has no administration in time range)   promethazine (PHENERGAN) tablet 12.5-25 mg (has no administration in time range)   promethazine (PHENERGAN) suppository 12.5-25 mg (has no administration in time range)   prochlorperazine (COMPAZINE) injection 5-10 mg (has no administration in time range)   senna-docusate (PERICOLACE or SENOKOT S) 8.6-50 MG per tablet 2 Tab (has no administration in time range)     And   polyethylene glycol/lytes (MIRALAX) PACKET 1 Packet (has no administration in time range)     And   magnesium hydroxide (MILK OF MAGNESIA) suspension 30 mL (has no administration in time range)     And   bisacodyl (DULCOLAX) suppository 10 mg (has no administration in time range)   lactated ringers infusion (BOLUS) (has no administration in time range)   lactated ringers infusion (has no administration in time range)   lactated ringers infusion (BOLUS): BMI less than or equal to 30 (has no administration in time range)   enoxaparin (LOVENOX) inj 40 mg (has no administration in time range)   Pharmacy Consult Request ...Pain Management Review 1 Each (has no administration in time range)     And   oxyCODONE immediate-release (ROXICODONE) tablet 5 mg (has no administration in time range)     And   oxyCODONE immediate-release (ROXICODONE) tablet 10 mg (has no administration in  time range)     And   morphine (pf) 4 mg/mL injection 4 mg (has no administration in time range)   NS infusion 1,000 mL (1,000 mL Intravenous New Bag 10/11/20 1829)   morphine (pf) 4 mg/mL injection 4 mg (4 mg Intravenous Given 10/11/20 1821)   ondansetron (ZOFRAN) syringe/vial injection 4 mg (4 mg Intravenous Given 10/11/20 1818)   VANCOMYCIN 1750 MG/500ML NS IVPB (PREMIX) (1,750 mg  New Bag 10/11/20 2036)   morphine (pf) 4 mg/mL injection 4 mg (4 mg Intravenous Given 10/11/20 1935)       COURSE & MEDICAL DECISION MAKING  Pertinent Labs & Imaging studies reviewed. (See chart for details)  Differential includes otitis externa, mastoiditis, cellulitis, otitis media    Patient was given IV fluids secondary to tachycardia, heart rate improved with IV fluids.    Discussed case with Dr. Murray's hospitalist who agrees that the patient probably will need to be seen by ENT and therefore will be transferred for admission to [facility].    Contact the ENT on-call at [facility] unfortunately he would not call me back.    I verified that the patient was wearing a mask and I was wearing appropriate PPE every time I entered the room. The patient's mask was on the patient at all times during my encounter except for a brief view of the oropharynx.    I reviewed the patient's CT findings from 2 days ago I do not think a repeat CT is ordered necessary at this point time.    Patient was started on IV vancomycin for failure of outpatient antibiotics with cellulitis.    Medical decision making: At this point time I think the patient has periauricular cellulitis failing outpatient antibiotics.  This is been recurrent for multiple years therefore I think the patient would benefit from a consult by ENT therefore will be transferred to [facility] where they have no special yard call.  Patient just had a CT 2 days ago did not show any mastoiditis I do not feel any obvious fluctuance  and because this would be this third CT within the month I did not feel that one was necessary tonight.    DISPOSITION:  Patient will be hospitalized by Dr. Ramsey in stable condition.    FINAL IMPRESSION  1. Facial cellulitis    2. Recurrent acute suppurative otitis media of right ear without spontaneous rupture of tympanic membrane      Electronically signed by: Rico Chirinos M.D., 10/11/2020 5:05 PM    This record was made with a voice recognition software. The software is not perfect. I have tried to correct any grammar, spelling or context errors to the best of my ability, but errors may still remain. Interpretation of this chart should be taken in this context.

## 2020-10-12 NOTE — PROGRESS NOTES
"Pharmacy Kinetics 45 y.o. female on vancomycin day # 2 10/12/2020    Currently on Vancomycin 1250 mg iv q12hr (2030)  Provider specified end date: TBD (7 days ordered)     Indication for Treatment: SSTI     Pertinent history per medical record: Admitted on 10/11/2020 for worsening ear pain and redness despite outpatient clindamycin and cephalexin. Repeat CT scan in ER showed left temporal bone cirrhotic changes and there is multiple opacified mastoid air cells similar to , most consistent with apical inflammation and muscle disease, with periauricular cellulitis and mild pericarditis with no evidence of abscess.      Other antibiotics: cefepime     Allergies: Pcn [penicillins], Toradol, Tramadol, Bactrim [sulfamethoxazole w-trimethoprim], Stadol [butorphanol], Blackberry [rubus fruticosus], Fish, Levaquin, and Tree nuts food allergy      List concerns for renal function: none noted     Pertinent cultures to date:   10/11 Blood Cx - NGTD     MRSA Nares : Ordered    Recent Labs     10/09/20  1730 10/11/20  1909 10/12/20  0256   WBC 8.9 10.6 7.0   NEUTSPOLYS 52.30 52.70 46.70     Recent Labs     10/09/20  1730 10/11/20  1830 10/12/20  0256   BUN 8 8 8   CREATININE 0.69 0.71 0.63     No results for input(s): VANCOTROUGH, VANCOPEAK, VANCORANDOM in the last 72 hours.    Intake/Output Summary (Last 24 hours) at 10/12/2020 1424  Last data filed at 10/12/2020 0830  Gross per 24 hour   Intake 480 ml   Output --   Net 480 ml      BP (!) 96/64   Pulse 87   Temp 37.3 °C (99.2 °F) (Temporal)   Resp 18   Ht 1.727 m (5' 8\")   Wt 70.5 kg (155 lb 6.8 oz)   SpO2 96%  Temp (24hrs), Av.8 °C (98.2 °F), Min:36.4 °C (97.6 °F), Max:37.3 °C (99.2 °F)      A/P   1. Vancomycin dose change: none  2. Next vancomycin level: 10/13 at 0800; prior to 4th dose; ordered  3. Goal trough: 10-15 mcg/mL  4. Comments: No update to culture results. MRSA nares ordered due to ENT proximity. WBC, SCr stable; afebrile. Will assess trough in " COURTNEY Celis, PharmD, BCPS

## 2020-10-12 NOTE — PROGRESS NOTES
"Bedside report received.  Assessment complete.  BP (!) 96/64 Comment: RN notified  Pulse 87   Temp 37.3 °C (99.2 °F) (Temporal)   Resp 18   Ht 1.727 m (5' 8\")   Wt 70.5 kg (155 lb 6.8 oz)   SpO2 96%   BMI 23.63 kg/m² .  A&O x 4. Patient calls appropriately.  Patient ambulates with no assist. Bed alarm off.   Patient has 10/10 pain. Pain managed with prescribed medications.  Denies N&V. Tolerating regular diet.  + void, + flatus, + BM.  Patient denies SOB.  SCD's off.  Review plan with of care with patient. Call light and personal belongings with in reach. Hourly rounding in place. All needs met at this time.  "

## 2020-10-12 NOTE — PROGRESS NOTES
Report received from Alis BRADLEY @ [facility]  Pt arrived via Rem  And this RN Assumed care of patient   Pt is A&O x4  RA denies SOB, dizziness, nausea or chest pain  Pain reported at 9/10. PRN oxycodone given  Regular Diet   Swelling noted to right side of face  + Urine output  Last BM 10/11, PTA   + Flatus  Up self   SCD's off  Bed in lowest position and locked.  Pt resting comfortably now.  Review plan of care with patient  Call light within reach  Hourly rounds in place  All needs met at this time

## 2020-10-12 NOTE — ED NOTES
Bed assignment at [facility] T [id number], ext [phone] for report and [facility] eta [phone].

## 2020-10-12 NOTE — PROGRESS NOTES
[facility] Medicine Daily Progress Note    Date of Service  10/12/2020    Chief Complaint  45 y.o. female admitted 10/11/2020 with R ear pain and facial pain    Hospital Course    44 yo F with h/o Crohn's disease, rheumatoid arthritis and recurrent R ear infections (recently moved from [place] with  and doesn't have PCP or local ENT yet).  Initially presented on 10/1/2020 with right-sided facial redness and ear pain, a CT scan showed no mastoiditis but evidence of otitis externa.  She was admitted and given IV antibiotics with clinical improvement and discharged on clindamycin.  However on 10/10 she started developing redness again around her right ear so she again went to the ED had another CT scan which showed right temporal bone with rare opacified right mastoid air cells, left temporal bone shows sclerotic changes and multiple opacified mastoid air cells (similar to 8/28/2020) most consistent with active inflammatory mastoid disease, hazy enhancement of the right superficial parotid lobe and adjacent periauricular soft tissue suggesting cellulitis and mild parotiditis, no evidence of cristino-auricular or retromastoid abscess pocket and was discharged on Keflex.  However her symptoms worsened so she again presented to the ED.  She was transferred from [facility] for ENT consult and IV abx.      Interval Problem Update  - Transferred/admitted yesterday evening  - On vanc/cefepime, MRSA nares negative so dc'ed vancomcyin  - consulted ENT, coming to see patient this afternoon  - patient reports still having severe pain R ear, does report some hearing loss of R ear, also reports drainage from ear but has been using drops so could just be drops  - Afebrile  - having diffuse itching (likely in setting of narcotics)    Consultants/Specialty  ENT ([facility])    Code Status  Full Code    Disposition  Likely home when medically clear    Review of Systems  Review of Systems   Constitutional: Negative for chills  and fever.   HENT: Positive for ear discharge, ear pain and hearing loss.    Eyes: Negative for blurred vision.   Respiratory: Negative for cough and shortness of breath.    Cardiovascular: Negative for chest pain and leg swelling.   Gastrointestinal: Negative for abdominal pain, nausea and vomiting.   Genitourinary: Negative for hematuria.   Musculoskeletal: Negative.    Skin: Positive for itching (diffuse itching) and rash (skin redness but no vesicles).   Neurological: Negative for focal weakness and weakness.        Physical Exam  Temp:  [36.4 °C (97.6 °F)-37.3 °C (99.2 °F)] 37.3 °C (99.2 °F)  Pulse:  [60-87] 87  Resp:  [16-18] 18  BP: ()/(64-82) 96/64  SpO2:  [96 %-99 %] 96 %    Physical Exam  Vitals signs and nursing note reviewed.   Constitutional:       General: She is not in acute distress.     Appearance: She is not toxic-appearing or diaphoretic.   HENT:      Head: Normocephalic and atraumatic.      Comments: R sided swelling around parotid gland and tenderness   Erythema around R ear and parotid  No evidence of vesicles or pustules  Unfortunately no otoscope on the entire floor     Nose: Nose normal.      Mouth/Throat:      Mouth: Mucous membranes are moist.      Comments: Poor dentition  Eyes:      General: No scleral icterus.     Conjunctiva/sclera: Conjunctivae normal.   Neck:      Musculoskeletal: Normal range of motion. Muscular tenderness (R sided) present.   Cardiovascular:      Rate and Rhythm: Normal rate and regular rhythm.      Heart sounds: No murmur. No friction rub. No gallop.    Pulmonary:      Effort: Pulmonary effort is normal.      Breath sounds: Normal breath sounds.   Abdominal:      General: Abdomen is flat. Bowel sounds are normal. There is no distension.      Palpations: Abdomen is soft.      Tenderness: There is no abdominal tenderness.   Musculoskeletal:      Right lower leg: No edema.      Left lower leg: No edema.   Lymphadenopathy:      Cervical: Cervical adenopathy  present.   Skin:     General: Skin is warm and dry.      Findings: Erythema (as described above) present.   Neurological:      Mental Status: She is alert and oriented to person, place, and time.   Psychiatric:         Mood and Affect: Mood normal.         Behavior: Behavior normal.         Fluids    Intake/Output Summary (Last 24 hours) at 10/12/2020 1615  Last data filed at 10/12/2020 0830  Gross per 24 hour   Intake 480 ml   Output --   Net 480 ml       Laboratory  Recent Labs     10/09/20  1730 10/11/20  1909 10/12/20  0256   WBC 8.9 10.6 7.0   RBC 4.72 4.77 4.04*   HEMOGLOBIN 12.7 13.0 11.2*   HEMATOCRIT 38.7 41.6 35.3*   MCV 82.0 87.2 87.4   MCH 26.9* 27.3 27.7   MCHC 32.8* 31.3* 31.7*   RDW 43.0 47.2 46.7   PLATELETCT 292 275 237   MPV 8.9* 9.1 9.1     Recent Labs     10/09/20  1730 10/11/20  1830 10/12/20  0256   SODIUM 139 141 141   POTASSIUM 4.2 4.1 3.5*   CHLORIDE 103 105 108   CO2 23 23 25   GLUCOSE 85 87 96   BUN 8 8 8   CREATININE 0.69 0.71 0.63   CALCIUM 9.6 9.5 8.5     Recent Labs     10/11/20  1830 10/12/20  0256   INR 0.99 1.01               Imaging  No orders to display        Assessment/Plan  Otitis externa, preauricular cellulitis with parotiditis, otitis media with mastoiditis, failed outpatient antibiotics- (present on admission)  Assessment & Plan  ESR/CRP normal  CT scan with mastoid disease  ENT consult  Pain control  Cefepime 10/11-**  dc'ed vanc since MRSA nares negative (although does have h/o MRSA apparently)    Facial cellulitis with  otitis externa- (present on admission)  Assessment & Plan  Long history of R otitis externa/media, was previously scheduled for mastoidectomy and tympanostomy tubes in [place] however insurance declined  No local ENT  Failed outpatient clindamycin and keflex  MRSA nares negative  Cefepime 10/11-**  ENT consult      Normocytic anemia  Assessment & Plan  Hemoglobin 11, MCV 87  No bleeding  Does have recurrent infections so may just be inflammatory of  chronic inflammation  Will check iron studies  CTM    Rheumatoid arthritis (HCC)- (present on admission)  Assessment & Plan  Patient is not currently on active therapy       VTE prophylaxis: Lovenox

## 2020-10-13 ENCOUNTER — APPOINTMENT (OUTPATIENT)
Dept: RADIOLOGY | Facility: MEDICAL CENTER | Age: 45
DRG: 603 | End: 2020-10-13
Attending: FAMILY MEDICINE

## 2020-10-13 LAB
ANION GAP SERPL CALC-SCNC: 11 MMOL/L (ref 7–16)
BASOPHILS # BLD AUTO: 0.3 % (ref 0–1.8)
BASOPHILS # BLD: 0.02 K/UL (ref 0–0.12)
BUN SERPL-MCNC: 6 MG/DL (ref 8–22)
CALCIUM SERPL-MCNC: 9 MG/DL (ref 8.5–10.5)
CHLORIDE SERPL-SCNC: 103 MMOL/L (ref 96–112)
CO2 SERPL-SCNC: 22 MMOL/L (ref 20–33)
CREAT SERPL-MCNC: 0.67 MG/DL (ref 0.5–1.4)
EOSINOPHIL # BLD AUTO: 0.31 K/UL (ref 0–0.51)
EOSINOPHIL NFR BLD: 4.4 % (ref 0–6.9)
ERYTHROCYTE [DISTWIDTH] IN BLOOD BY AUTOMATED COUNT: 45.6 FL (ref 35.9–50)
FERRITIN SERPL-MCNC: 23.1 NG/ML (ref 10–291)
GLUCOSE BLD-MCNC: 89 MG/DL (ref 65–99)
GLUCOSE SERPL-MCNC: 105 MG/DL (ref 65–99)
HCT VFR BLD AUTO: 35.9 % (ref 37–47)
HGB BLD-MCNC: 11.6 G/DL (ref 12–16)
IMM GRANULOCYTES # BLD AUTO: 0.02 K/UL (ref 0–0.11)
IMM GRANULOCYTES NFR BLD AUTO: 0.3 % (ref 0–0.9)
LYMPHOCYTES # BLD AUTO: 2.28 K/UL (ref 1–4.8)
LYMPHOCYTES NFR BLD: 32.2 % (ref 22–41)
MCH RBC QN AUTO: 27.8 PG (ref 27–33)
MCHC RBC AUTO-ENTMCNC: 32.3 G/DL (ref 33.6–35)
MCV RBC AUTO: 85.9 FL (ref 81.4–97.8)
MONOCYTES # BLD AUTO: 0.36 K/UL (ref 0–0.85)
MONOCYTES NFR BLD AUTO: 5.1 % (ref 0–13.4)
NEUTROPHILS # BLD AUTO: 4.09 K/UL (ref 2–7.15)
NEUTROPHILS NFR BLD: 57.7 % (ref 44–72)
NRBC # BLD AUTO: 0 K/UL
NRBC BLD-RTO: 0 /100 WBC
PLATELET # BLD AUTO: 219 K/UL (ref 164–446)
PMV BLD AUTO: 9.1 FL (ref 9–12.9)
POTASSIUM SERPL-SCNC: 3.5 MMOL/L (ref 3.6–5.5)
RBC # BLD AUTO: 4.18 M/UL (ref 4.2–5.4)
SODIUM SERPL-SCNC: 136 MMOL/L (ref 135–145)
WBC # BLD AUTO: 7.1 K/UL (ref 4.8–10.8)

## 2020-10-13 PROCEDURE — 700102 HCHG RX REV CODE 250 W/ 637 OVERRIDE(OP): Performed by: INTERNAL MEDICINE

## 2020-10-13 PROCEDURE — 99232 SBSQ HOSP IP/OBS MODERATE 35: CPT | Performed by: FAMILY MEDICINE

## 2020-10-13 PROCEDURE — 82728 ASSAY OF FERRITIN: CPT

## 2020-10-13 PROCEDURE — 80048 BASIC METABOLIC PNL TOTAL CA: CPT

## 2020-10-13 PROCEDURE — 770006 HCHG ROOM/CARE - MED/SURG/GYN SEMI*

## 2020-10-13 PROCEDURE — 82962 GLUCOSE BLOOD TEST: CPT

## 2020-10-13 PROCEDURE — 700105 HCHG RX REV CODE 258: Performed by: INTERNAL MEDICINE

## 2020-10-13 PROCEDURE — 700111 HCHG RX REV CODE 636 W/ 250 OVERRIDE (IP): Performed by: INTERNAL MEDICINE

## 2020-10-13 PROCEDURE — A9270 NON-COVERED ITEM OR SERVICE: HCPCS | Performed by: INTERNAL MEDICINE

## 2020-10-13 PROCEDURE — A9270 NON-COVERED ITEM OR SERVICE: HCPCS | Performed by: FAMILY MEDICINE

## 2020-10-13 PROCEDURE — 700102 HCHG RX REV CODE 250 W/ 637 OVERRIDE(OP): Performed by: FAMILY MEDICINE

## 2020-10-13 PROCEDURE — 700105 HCHG RX REV CODE 258

## 2020-10-13 PROCEDURE — 36415 COLL VENOUS BLD VENIPUNCTURE: CPT

## 2020-10-13 PROCEDURE — 700111 HCHG RX REV CODE 636 W/ 250 OVERRIDE (IP): Performed by: FAMILY MEDICINE

## 2020-10-13 PROCEDURE — 85025 COMPLETE CBC W/AUTO DIFF WBC: CPT

## 2020-10-13 RX ORDER — NICOTINE 21 MG/24HR
21 PATCH, TRANSDERMAL 24 HOURS TRANSDERMAL
Status: DISCONTINUED | OUTPATIENT
Start: 2020-10-13 | End: 2020-10-14 | Stop reason: HOSPADM

## 2020-10-13 RX ORDER — SODIUM CHLORIDE 9 MG/ML
INJECTION, SOLUTION INTRAVENOUS
Status: COMPLETED
Start: 2020-10-13 | End: 2020-10-13

## 2020-10-13 RX ORDER — DEXAMETHASONE SODIUM PHOSPHATE 4 MG/ML
4 INJECTION, SOLUTION INTRA-ARTICULAR; INTRALESIONAL; INTRAMUSCULAR; INTRAVENOUS; SOFT TISSUE EVERY 6 HOURS
Status: DISCONTINUED | OUTPATIENT
Start: 2020-10-13 | End: 2020-10-14 | Stop reason: HOSPADM

## 2020-10-13 RX ORDER — PROMETHAZINE HYDROCHLORIDE 25 MG/1
TABLET ORAL
Status: ACTIVE
Start: 2020-10-13 | End: 2020-10-14

## 2020-10-13 RX ADMIN — MORPHINE SULFATE 4 MG: 4 INJECTION INTRAVENOUS at 00:10

## 2020-10-13 RX ADMIN — MORPHINE SULFATE 4 MG: 4 INJECTION INTRAVENOUS at 05:03

## 2020-10-13 RX ADMIN — DEXAMETHASONE SODIUM PHOSPHATE 4 MG: 4 INJECTION, SOLUTION INTRA-ARTICULAR; INTRALESIONAL; INTRAMUSCULAR; INTRAVENOUS; SOFT TISSUE at 17:35

## 2020-10-13 RX ADMIN — NEOMYCIN SULFATE, POLYMYXIN B SULFATE, HYDROCORTISONE 3 DROP: 3.5; 10000; 1 SOLUTION/ DROPS AURICULAR (OTIC) at 04:36

## 2020-10-13 RX ADMIN — OXYCODONE HYDROCHLORIDE 10 MG: 10 TABLET ORAL at 12:40

## 2020-10-13 RX ADMIN — MORPHINE SULFATE 4 MG: 4 INJECTION INTRAVENOUS at 22:33

## 2020-10-13 RX ADMIN — NEOMYCIN SULFATE, POLYMYXIN B SULFATE, HYDROCORTISONE 3 DROP: 3.5; 10000; 1 SOLUTION/ DROPS AURICULAR (OTIC) at 17:35

## 2020-10-13 RX ADMIN — DEXAMETHASONE SODIUM PHOSPHATE 4 MG: 4 INJECTION, SOLUTION INTRA-ARTICULAR; INTRALESIONAL; INTRAMUSCULAR; INTRAVENOUS; SOFT TISSUE at 12:39

## 2020-10-13 RX ADMIN — DIPHENHYDRAMINE HYDROCHLORIDE 25 MG: 25 TABLET ORAL at 15:43

## 2020-10-13 RX ADMIN — OXYCODONE HYDROCHLORIDE 10 MG: 10 TABLET ORAL at 03:49

## 2020-10-13 RX ADMIN — NEOMYCIN SULFATE, POLYMYXIN B SULFATE, HYDROCORTISONE 3 DROP: 3.5; 10000; 1 SOLUTION/ DROPS AURICULAR (OTIC) at 00:10

## 2020-10-13 RX ADMIN — MORPHINE SULFATE 4 MG: 4 INJECTION INTRAVENOUS at 18:36

## 2020-10-13 RX ADMIN — CEFEPIME 2 G: 2 INJECTION, POWDER, FOR SOLUTION INTRAVENOUS at 13:03

## 2020-10-13 RX ADMIN — OXYCODONE HYDROCHLORIDE 10 MG: 10 TABLET ORAL at 08:14

## 2020-10-13 RX ADMIN — QUETIAPINE FUMARATE 50 MG: 25 TABLET ORAL at 21:12

## 2020-10-13 RX ADMIN — ENOXAPARIN SODIUM 40 MG: 40 INJECTION SUBCUTANEOUS at 04:36

## 2020-10-13 RX ADMIN — MORPHINE SULFATE 4 MG: 4 INJECTION INTRAVENOUS at 09:32

## 2020-10-13 RX ADMIN — PROMETHAZINE HYDROCHLORIDE 25 MG: 25 TABLET ORAL at 22:41

## 2020-10-13 RX ADMIN — OXYCODONE HYDROCHLORIDE 10 MG: 10 TABLET ORAL at 21:12

## 2020-10-13 RX ADMIN — SODIUM CHLORIDE 500 ML: 9 INJECTION, SOLUTION INTRAVENOUS at 13:04

## 2020-10-13 RX ADMIN — NEOMYCIN SULFATE, POLYMYXIN B SULFATE, HYDROCORTISONE 3 DROP: 3.5; 10000; 1 SOLUTION/ DROPS AURICULAR (OTIC) at 12:39

## 2020-10-13 RX ADMIN — NICOTINE TRANSDERMAL SYSTEM 21 MG: 21 PATCH, EXTENDED RELEASE TRANSDERMAL at 11:40

## 2020-10-13 RX ADMIN — OXYCODONE HYDROCHLORIDE 10 MG: 10 TABLET ORAL at 17:34

## 2020-10-13 RX ADMIN — MORPHINE SULFATE 4 MG: 4 INJECTION INTRAVENOUS at 13:52

## 2020-10-13 RX ADMIN — CEFEPIME 2 G: 2 INJECTION, POWDER, FOR SOLUTION INTRAVENOUS at 00:10

## 2020-10-13 ASSESSMENT — ENCOUNTER SYMPTOMS
BLURRED VISION: 0
HEADACHES: 0
DIZZINESS: 0
NAUSEA: 0
MYALGIAS: 0
NECK PAIN: 0
DOUBLE VISION: 0
FEVER: 0
PALPITATIONS: 0
COUGH: 0
VOMITING: 0
CHILLS: 0

## 2020-10-13 ASSESSMENT — PAIN DESCRIPTION - PAIN TYPE
TYPE: ACUTE PAIN

## 2020-10-13 NOTE — CARE PLAN
Problem: Communication  Goal: The ability to communicate needs accurately and effectively will improve  Outcome: PROGRESSING AS EXPECTED  POC reviewed with patient. All questions and concerns addressed     Problem: Safety  Goal: Will remain free from injury  Outcome: PROGRESSING AS EXPECTED  Patient using call light appropriately. Bed low and locked, non skid socks in place.

## 2020-10-13 NOTE — PROGRESS NOTES
Assumed care of patient. Assessment complete.  AA&Ox4. Denies CP/SOB.  Reporting 9/10 pain. Medicated per MAR.  R sided facial redness. Bruising to abdomen.  Tolerating regular diet. Denies N/V.  + void. Last BM PTA  per patient report..  Pt ambulates independently.  All needs met at this time. Call light within reach. Pt calls appropriately. Will continue to monitor.

## 2020-10-13 NOTE — DISCHARGE PLANNING
Anticipated Discharge Disposition: Home    Action: Discussed in IDT rounds. Pt pending ENT consult, not medically cleared.     Pt lives with her significant other, her adult son, and son's girlfriend in an apartment.  Pt states she has no income and applied for Medicaid 1 week ago. Pt may need assistance at discharge with medications; CM to follow.      Barriers to Discharge:   Medical clearance  ENT consult  IV antibiotics  No payor source-pt states she is pending Medicaid    Plan: Continue to follow and assist with discharge plan.     Care Transition Team Assessment    Information Source  Orientation : Oriented x 4  Who is responsible for making decisions for patient? : Patient    Readmission Evaluation  Is this a readmission?: Yes - planned readmission    Elopement Risk  Legal Hold: No  Ambulatory or Self Mobile in Wheelchair: Yes  Disoriented: No  Psychiatric Symptoms: None  History of Wandering: No  Elopement this Admit: No  Vocalizing Wanting to Leave: No  Displays Behaviors, Body Language Wanting to Leave: No-Not at Risk for Elopement  Elopement Risk: Not at Risk for Elopement    Interdisciplinary Discharge Planning  Primary Care Physician: None  Lives with - Patient's Self Care Capacity: Spouse, Adult Children  Patient or legal guardian wants to designate a caregiver: No  Support Systems: Spouse / Significant Other, Children  Housing / Facility: 1 Story Apartment / Condo  Able to Return to Previous ADL's: Yes  Mobility Issues: No  Prior Services: Home-Independent  Patient Prefers to be Discharged to:: Home  Assistance Needed: Unknown at this Time  Durable Medical Equipment: Not Applicable    Discharge Preparedness  What is your plan after discharge?: Home with help  What are your discharge supports?: Spouse, Child  Prior Functional Level: Ambulatory, Independent with Activities of Daily Living, Independent with Medication Management  Difficulity with ADLs: None  Difficulity with IADLs: None    Functional  Assesment  Prior Functional Level: Ambulatory, Independent with Activities of Daily Living, Independent with Medication Management    Finances  Financial Barriers to Discharge: Yes  Average Monthly Income: 0 $  Prescription Coverage: No    Vision / Hearing Impairment  Hearing Impairment: Both Ears         Advance Directive  Advance Directive?: None    Domestic Abuse  Have you ever been the victim of abuse or violence?: No  Physical Abuse or Sexual Abuse: No  Verbal Abuse or Emotional Abuse: No  Possible Abuse/Neglect Reported to:: Not Applicable         Discharge Risks or Barriers  Discharge risks or barriers?: No PCP, Uninsured / underinsured    Anticipated Discharge Information  Discharge Disposition: Discharged to home/self care (01)  Discharge Address: [facility] [address], [place] [address]  Discharge Contact Phone Number: [phone]

## 2020-10-13 NOTE — CONSULTS
DATE OF SERVICE:  10/12/2020    OTOLARYNGOLOGY CONSULTATION    REQUESTING PHYSICIAN:  Dena Tiwari MD    REASON FOR CONSULTATION:  Ear infection, possible mastoid infection.    HISTORY OF PRESENT ILLNESS:  Patient is a 45-year-old woman who apparently has   had difficulty with recurrent ear infections, predominantly on the right   side.  Most recently, she has had an infection for a few weeks and has been   treated with oral antibiotics including clindamycin and Keflex as well as   ofloxacin drops.  She continues to have increasing ear pain, which radiates   over the mastoid tip area and up into the parietal area of the head and into   the preauricular parotid area.  She was evaluated in the emergency room and a   CT scan was obtained.  The CT scan shows debris in the external auditory canal   as well as mild swelling.  No middle ear fluid is noted.  There is a scant   amount of mucosal thickening along the inferolateral mastoid air cells.    Overall, the mastoid is well aerated and without bony destruction.  There is   no obvious periosteal inflammatory change along the mastoid cortex.    PAST MEDICAL HISTORY:  Remarkable for history of rheumatoid arthritis and a   history of Crohn's disease.  She has a history of MRSA in the past as well,   although the nasal swab this admission was negative.    ALLERGIES:  PENICILLIN, TORADOL, TRAMADOL, SULFA DRUGS, STADOL, AND LEVAQUIN   ARE REPORTED, although the Levaquin is reported as vomiting, which is not a   true allergy.    MEDICATIONS:  As listed in the chart.    PHYSICAL EXAMINATION:  GENERAL:  Shows the patient to appear to be in pain, but is otherwise resting   comfortably and is in no acute respiratory distress.  She is awake and alert   and responds appropriately to questions.  NECK:  Supple with the patient able to move her head from side to side without   difficulty.  HEENT:  There is moderate amount of soft tissue swelling of the external ear   lobule and  inferior helical rim and conchal area.  This appears to have some   brawny edema suggestive of perichondritis.  Obvious cystic swelling or abscess   formation is not apparent along the lobule, external ear, parotid area or   neck.  Facial function is symmetric.  The extraocular muscles are full and no   nystagmus is noted.  Lips are normal.  She has poor dentition.  The right   external ear was visualized with an otoscope.  There is actually minimal   swelling and no obvious otorrhea.  There does appear to be some yellowish   debris and discharge present medially along the external canal against the TM.    I am unable to clean this at the bedside.  The TM is not well visualized.    An attempt was made to culture the ear with a swab, although the swab is   somewhat larger than is normally used in the office for obtaining an ear   culture.  Significant mastoid swelling or redness to suggest acute mastoiditis   or Pott's puffy tumor is not apparent.    DIAGNOSTIC DATA:  The patient's CT scan was reviewed with the previously   mentioned findings above confirmed.  The degree of mastoid involvement   reported by the radiologist is felt to be overstated.    IMPRESSION:  Right external otitis and possible perichondritis.  IV antibiotic   therapy is appropriate along with topical drop therapy.  The patient may   require steroid treatment to reduce the inflammation if there are ongoing   issues with perichondritis, which are potentially noninflammatory in nature.    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  Surgical   intervention is not felt to be necessary with regards to the ear canal, middle   ear, or mastoid based on the radiographic findings.  Please call if   additional evaluation is required in the future.       ____________________________________     MD WALKER BELL / JI    DD:  10/12/2020 18:43:39  DT:  10/12/2020  20:19:37    D#:  [id number]  Job#:  [id number]

## 2020-10-13 NOTE — PROGRESS NOTES
[facility] Medicine Daily Progress Note    Date of Service  10/13/2020    Chief Complaint  45 y.o. female admitted 10/11/2020 with R ear pain and facial pain    Hospital Course    46 yo F with h/o Crohn's disease, rheumatoid arthritis and recurrent R ear infections (recently moved from [place] with  and doesn't have PCP or local ENT yet).  Initially presented on 10/1/2020 with right-sided facial redness and ear pain, a CT scan showed no mastoiditis but evidence of otitis externa.  She was admitted and given IV antibiotics with clinical improvement and discharged on clindamycin.  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  Adjacent periauricular soft tissue suggesting cellulitis and mild parotiditis, no evidence of cristino-auricular or retromastoid abscess pocket and was discharged on Keflex.  However her symptoms worsened so she again presented to the ED.  She was transferred from [facility] for ENT consult and IV abx.      Interval Problem Update  10/13: Standing up in bed comfortably.  Afebrile.  Stated that pain on the right side of the face slightly improved.  Hemodynamically stable.  Requested nicotine patch.  ENT recommendations noted.  Was started on Decadron.  N.p.o. intake.  IV fluids discontinued.  No acute distress noted.  No issues overnight per staff.    Consultants/Specialty  ENT (Anibal)    Code Status  Full Code    Disposition  Likely home when medically clear    Review of Systems  Review of Systems   Constitutional: Negative for chills and fever.   HENT: Positive for ear discharge, ear pain and  hearing loss (Chronic hearing loss on right ).    Eyes: Negative for blurred vision and double vision.   Respiratory: Negative for cough.    Cardiovascular: Negative for chest pain and palpitations.   Gastrointestinal: Negative for nausea and vomiting.   Genitourinary: Negative for dysuria and urgency.   Musculoskeletal: Negative for myalgias and neck pain.   Skin: Positive for itching (diffuse itching) and rash (skin redness but no vesicles).   Neurological: Negative for dizziness and headaches.        Physical Exam  Temp:  [36.5 °C (97.7 °F)-37.1 °C (98.8 °F)] 36.9 °C (98.5 °F)  Pulse:  [62-80] 80  Resp:  [18] 18  BP: ()/(63-69) 105/66  SpO2:  [94 %-97 %] 94 %    Physical Exam  Vitals signs and nursing note reviewed.   Constitutional:       Appearance: She is not toxic-appearing or diaphoretic.   HENT:      Head: Normocephalic and atraumatic.      Comments: R sided swelling around parotid gland and tenderness   Erythema around R ear and parotid  No evidence of vesicles or pustules       Nose: Nose normal.      Mouth/Throat:      Mouth: Mucous membranes are moist.      Comments: Poor dentition  Eyes:      General: No scleral icterus.     Conjunctiva/sclera: Conjunctivae normal.   Neck:      Musculoskeletal: Normal range of motion. Muscular tenderness (R sided) present.   Cardiovascular:      Rate and Rhythm: Normal rate and regular rhythm.      Heart sounds: No friction rub. No gallop.    Pulmonary:      Effort: Pulmonary effort is normal.      Breath sounds: Normal breath sounds.   Abdominal:      General: Abdomen is flat. There is no distension.      Palpations: Abdomen is soft.   Musculoskeletal:      Right lower leg: No edema.      Left lower leg: No edema.   Lymphadenopathy:      Cervical: Cervical adenopathy present.   Skin:     General: Skin is warm and dry.      Findings: Erythema (as described above) present.   Neurological:      General: No focal deficit present.      Mental Status: She is alert and  oriented to person, place, and time.   Psychiatric:         Mood and Affect: Mood normal.         Behavior: Behavior normal.         Fluids    Intake/Output Summary (Last 24 hours) at 10/13/2020 1244  Last data filed at 10/13/2020 0805  Gross per 24 hour   Intake 980 ml   Output --   Net 980 ml       Laboratory  Recent Labs     10/11/20  1909 10/12/20  0256 10/13/20  0209   WBC 10.6 7.0 7.1   RBC 4.77 4.04* 4.18*   HEMOGLOBIN 13.0 11.2* 11.6*   HEMATOCRIT 41.6 35.3* 35.9*   MCV 87.2 87.4 85.9   MCH 27.3 27.7 27.8   MCHC 31.3* 31.7* 32.3*   RDW 47.2 46.7 45.6   PLATELETCT 275 237 219   MPV 9.1 9.1 9.1     Recent Labs     10/11/20  1830 10/12/20  0256 10/13/20  0209   SODIUM 141 141 136   POTASSIUM 4.1 3.5* 3.5*   CHLORIDE 105 108 103   CO2 23 25 22   GLUCOSE 87 96 105*   BUN 8 8 6*   CREATININE 0.71 0.63 0.67   CALCIUM 9.5 8.5 9.0     Recent Labs     10/11/20  1830 10/12/20  0256   INR 0.99 1.01               Imaging  IR-US GUIDED PIV   Final Result    Ultrasound-guided PERIPHERAL IV INSERTION performed by    qualified nursing staff as above.           Assessment/Plan  Otitis externa, preauricular cellulitis with parotiditis, otitis media with mastoiditis, failed outpatient antibiotics- (present on admission)  Assessment & Plan  ESR/CRP normal  CT scan with mastoid disease  Management as above     Facial cellulitis with  otitis externa- (present on admission)  Assessment & Plan  Long history of R otitis externa/media, was previously scheduled for mastoidectomy and tympanostomy tubes in [place] however insurance declined  No local ENT  Failed outpatient clindamycin and keflex  MRSA nares negative  Cefepime started on 10/11-**  ENT recommending conservative management with abx and steroids.       Tobacco dependence- (present on admission)  Assessment & Plan  Patient was counseled.  Started on nicotine replacement per protocol.    Normocytic anemia  Assessment & Plan  Hemoglobin 11, MCV 87  No bleeding  Does have  recurrent infections so may just be inflammatory of chronic inflammation  Will check iron studies  CTM    Rheumatoid arthritis (HCC)- (present on admission)  Assessment & Plan  Patient is not currently on active therapy       VTE prophylaxis: Lovenox

## 2020-10-14 ENCOUNTER — PATIENT OUTREACH (OUTPATIENT)
Dept: HEALTH INFORMATION MANAGEMENT | Facility: OTHER | Age: 45
End: 2020-10-14

## 2020-10-14 VITALS
DIASTOLIC BLOOD PRESSURE: 72 MMHG | BODY MASS INDEX: 23.56 KG/M2 | TEMPERATURE: 97.7 F | HEIGHT: 68 IN | SYSTOLIC BLOOD PRESSURE: 128 MMHG | OXYGEN SATURATION: 95 % | WEIGHT: 155.42 LBS | HEART RATE: 103 BPM | RESPIRATION RATE: 16 BRPM

## 2020-10-14 LAB
ALBUMIN SERPL BCP-MCNC: 3.6 G/DL (ref 3.2–4.9)
ALBUMIN/GLOB SERPL: 1 G/DL
ALP SERPL-CCNC: 112 U/L (ref 30–99)
ALT SERPL-CCNC: 17 U/L (ref 2–50)
ANION GAP SERPL CALC-SCNC: 14 MMOL/L (ref 7–16)
AST SERPL-CCNC: 13 U/L (ref 12–45)
BASOPHILS # BLD AUTO: 0.1 % (ref 0–1.8)
BASOPHILS # BLD: 0.01 K/UL (ref 0–0.12)
BILIRUB SERPL-MCNC: 0.3 MG/DL (ref 0.1–1.5)
BUN SERPL-MCNC: 8 MG/DL (ref 8–22)
CALCIUM SERPL-MCNC: 9.3 MG/DL (ref 8.5–10.5)
CHLORIDE SERPL-SCNC: 100 MMOL/L (ref 96–112)
CO2 SERPL-SCNC: 23 MMOL/L (ref 20–33)
CREAT SERPL-MCNC: 0.72 MG/DL (ref 0.5–1.4)
EOSINOPHIL # BLD AUTO: 0 K/UL (ref 0–0.51)
EOSINOPHIL NFR BLD: 0 % (ref 0–6.9)
ERYTHROCYTE [DISTWIDTH] IN BLOOD BY AUTOMATED COUNT: 44.2 FL (ref 35.9–50)
GLOBULIN SER CALC-MCNC: 3.5 G/DL (ref 1.9–3.5)
GLUCOSE BLD-MCNC: 156 MG/DL (ref 65–99)
GLUCOSE SERPL-MCNC: 217 MG/DL (ref 65–99)
HCT VFR BLD AUTO: 33 % (ref 37–47)
HGB BLD-MCNC: 11 G/DL (ref 12–16)
IMM GRANULOCYTES # BLD AUTO: 0.03 K/UL (ref 0–0.11)
IMM GRANULOCYTES NFR BLD AUTO: 0.4 % (ref 0–0.9)
LYMPHOCYTES # BLD AUTO: 1.11 K/UL (ref 1–4.8)
LYMPHOCYTES NFR BLD: 13.6 % (ref 22–41)
MCH RBC QN AUTO: 28.1 PG (ref 27–33)
MCHC RBC AUTO-ENTMCNC: 33.3 G/DL (ref 33.6–35)
MCV RBC AUTO: 84.2 FL (ref 81.4–97.8)
MONOCYTES # BLD AUTO: 0.17 K/UL (ref 0–0.85)
MONOCYTES NFR BLD AUTO: 2.1 % (ref 0–13.4)
NEUTROPHILS # BLD AUTO: 6.85 K/UL (ref 2–7.15)
NEUTROPHILS NFR BLD: 83.8 % (ref 44–72)
NRBC # BLD AUTO: 0 K/UL
NRBC BLD-RTO: 0 /100 WBC
PLATELET # BLD AUTO: 264 K/UL (ref 164–446)
PMV BLD AUTO: 9.3 FL (ref 9–12.9)
POTASSIUM SERPL-SCNC: 3.7 MMOL/L (ref 3.6–5.5)
PROT SERPL-MCNC: 7.1 G/DL (ref 6–8.2)
RBC # BLD AUTO: 3.92 M/UL (ref 4.2–5.4)
SODIUM SERPL-SCNC: 137 MMOL/L (ref 135–145)
WBC # BLD AUTO: 8.2 K/UL (ref 4.8–10.8)

## 2020-10-14 PROCEDURE — 700111 HCHG RX REV CODE 636 W/ 250 OVERRIDE (IP): Performed by: INTERNAL MEDICINE

## 2020-10-14 PROCEDURE — 700102 HCHG RX REV CODE 250 W/ 637 OVERRIDE(OP): Performed by: INTERNAL MEDICINE

## 2020-10-14 PROCEDURE — 82962 GLUCOSE BLOOD TEST: CPT

## 2020-10-14 PROCEDURE — 85025 COMPLETE CBC W/AUTO DIFF WBC: CPT

## 2020-10-14 PROCEDURE — A9270 NON-COVERED ITEM OR SERVICE: HCPCS | Performed by: FAMILY MEDICINE

## 2020-10-14 PROCEDURE — 700102 HCHG RX REV CODE 250 W/ 637 OVERRIDE(OP): Performed by: FAMILY MEDICINE

## 2020-10-14 PROCEDURE — 99239 HOSP IP/OBS DSCHRG MGMT >30: CPT | Performed by: FAMILY MEDICINE

## 2020-10-14 PROCEDURE — 36415 COLL VENOUS BLD VENIPUNCTURE: CPT

## 2020-10-14 PROCEDURE — 700105 HCHG RX REV CODE 258: Performed by: INTERNAL MEDICINE

## 2020-10-14 PROCEDURE — 700111 HCHG RX REV CODE 636 W/ 250 OVERRIDE (IP): Performed by: FAMILY MEDICINE

## 2020-10-14 PROCEDURE — A9270 NON-COVERED ITEM OR SERVICE: HCPCS | Performed by: INTERNAL MEDICINE

## 2020-10-14 PROCEDURE — 80053 COMPREHEN METABOLIC PANEL: CPT

## 2020-10-14 RX ORDER — PREDNISONE 20 MG/1
20 TABLET ORAL DAILY
Qty: 7 TAB | Refills: 0 | Status: SHIPPED | OUTPATIENT
Start: 2020-10-14 | End: 2020-10-21

## 2020-10-14 RX ORDER — OXYCODONE HYDROCHLORIDE 5 MG/1
5 TABLET ORAL
Qty: 40 TAB | Refills: 0 | Status: SHIPPED | OUTPATIENT
Start: 2020-10-14 | End: 2020-10-19

## 2020-10-14 RX ORDER — OXYCODONE HYDROCHLORIDE 5 MG/1
5 TABLET ORAL
Qty: 40 TAB | Refills: 0 | Status: SHIPPED | OUTPATIENT
Start: 2020-10-14 | End: 2020-10-14

## 2020-10-14 RX ORDER — DEXTROSE MONOHYDRATE 25 G/50ML
50 INJECTION, SOLUTION INTRAVENOUS
Status: DISCONTINUED | OUTPATIENT
Start: 2020-10-14 | End: 2020-10-14 | Stop reason: HOSPADM

## 2020-10-14 RX ORDER — CEFUROXIME AXETIL 500 MG/1
500 TABLET ORAL 2 TIMES DAILY
Qty: 20 TAB | Refills: 0 | Status: SHIPPED | OUTPATIENT
Start: 2020-10-14 | End: 2020-10-24

## 2020-10-14 RX ORDER — ONDANSETRON 4 MG/1
4 TABLET, ORALLY DISINTEGRATING ORAL EVERY 4 HOURS PRN
Qty: 10 TAB | Refills: 0 | Status: ON HOLD | OUTPATIENT
Start: 2020-10-14 | End: 2021-04-18

## 2020-10-14 RX ADMIN — MORPHINE SULFATE 4 MG: 4 INJECTION INTRAVENOUS at 03:06

## 2020-10-14 RX ADMIN — OXYCODONE HYDROCHLORIDE 10 MG: 10 TABLET ORAL at 05:53

## 2020-10-14 RX ADMIN — OXYCODONE HYDROCHLORIDE 10 MG: 10 TABLET ORAL at 01:38

## 2020-10-14 RX ADMIN — ENOXAPARIN SODIUM 40 MG: 40 INJECTION SUBCUTANEOUS at 05:53

## 2020-10-14 RX ADMIN — NEOMYCIN SULFATE, POLYMYXIN B SULFATE, HYDROCORTISONE 3 DROP: 3.5; 10000; 1 SOLUTION/ DROPS AURICULAR (OTIC) at 05:58

## 2020-10-14 RX ADMIN — NEOMYCIN SULFATE, POLYMYXIN B SULFATE, HYDROCORTISONE 3 DROP: 3.5; 10000; 1 SOLUTION/ DROPS AURICULAR (OTIC) at 12:17

## 2020-10-14 RX ADMIN — OXYCODONE HYDROCHLORIDE 10 MG: 10 TABLET ORAL at 15:08

## 2020-10-14 RX ADMIN — OXYCODONE HYDROCHLORIDE 10 MG: 10 TABLET ORAL at 11:01

## 2020-10-14 RX ADMIN — MORPHINE SULFATE 4 MG: 4 INJECTION INTRAVENOUS at 07:24

## 2020-10-14 RX ADMIN — MORPHINE SULFATE 4 MG: 4 INJECTION INTRAVENOUS at 12:17

## 2020-10-14 RX ADMIN — DEXAMETHASONE SODIUM PHOSPHATE 4 MG: 4 INJECTION, SOLUTION INTRA-ARTICULAR; INTRALESIONAL; INTRAMUSCULAR; INTRAVENOUS; SOFT TISSUE at 12:17

## 2020-10-14 RX ADMIN — DEXAMETHASONE SODIUM PHOSPHATE 4 MG: 4 INJECTION, SOLUTION INTRA-ARTICULAR; INTRALESIONAL; INTRAMUSCULAR; INTRAVENOUS; SOFT TISSUE at 05:53

## 2020-10-14 RX ADMIN — NICOTINE TRANSDERMAL SYSTEM 21 MG: 21 PATCH, EXTENDED RELEASE TRANSDERMAL at 05:54

## 2020-10-14 RX ADMIN — INSULIN HUMAN 2 UNITS: 100 INJECTION, SOLUTION PARENTERAL at 11:07

## 2020-10-14 RX ADMIN — DEXAMETHASONE SODIUM PHOSPHATE 4 MG: 4 INJECTION, SOLUTION INTRA-ARTICULAR; INTRALESIONAL; INTRAMUSCULAR; INTRAVENOUS; SOFT TISSUE at 01:52

## 2020-10-14 RX ADMIN — NEOMYCIN SULFATE, POLYMYXIN B SULFATE, HYDROCORTISONE 3 DROP: 3.5; 10000; 1 SOLUTION/ DROPS AURICULAR (OTIC) at 01:38

## 2020-10-14 RX ADMIN — CEFEPIME 2 G: 2 INJECTION, POWDER, FOR SOLUTION INTRAVENOUS at 01:59

## 2020-10-14 RX ADMIN — CEFEPIME 2 G: 2 INJECTION, POWDER, FOR SOLUTION INTRAVENOUS at 13:11

## 2020-10-14 SDOH — ECONOMIC STABILITY: FOOD INSECURITY: WITHIN THE PAST 12 MONTHS, YOU WORRIED THAT YOUR FOOD WOULD RUN OUT BEFORE YOU GOT MONEY TO BUY MORE.: NEVER TRUE

## 2020-10-14 SDOH — ECONOMIC STABILITY: FOOD INSECURITY: WITHIN THE PAST 12 MONTHS, THE FOOD YOU BOUGHT JUST DIDN'T LAST AND YOU DIDN'T HAVE MONEY TO GET MORE.: NEVER TRUE

## 2020-10-14 SDOH — ECONOMIC STABILITY: INCOME INSECURITY: HOW HARD IS IT FOR YOU TO PAY FOR THE VERY BASICS LIKE FOOD, HOUSING, MEDICAL CARE, AND HEATING?: NOT VERY HARD

## 2020-10-14 ASSESSMENT — FIBROSIS 4 INDEX: FIB4 SCORE: 0.8

## 2020-10-14 ASSESSMENT — PAIN DESCRIPTION - PAIN TYPE
TYPE: ACUTE PAIN

## 2020-10-14 NOTE — CARE PLAN
Problem: Communication  Goal: The ability to communicate needs accurately and effectively will improve  Outcome: PROGRESSING AS EXPECTED  Note: Patient updated on POC, all questions answered at this time.     Problem: Safety  Goal: Will remain free from injury  Outcome: PROGRESSING AS EXPECTED  Note: Bed in locked and lowest position, call light within reach.     Problem: Infection  Goal: Will remain free from infection  Outcome: PROGRESSING AS EXPECTED  Note: Abx per MAR.

## 2020-10-14 NOTE — CARE PLAN
Problem: Communication  Goal: The ability to communicate needs accurately and effectively will improve  Outcome: PROGRESSING AS EXPECTED  Reviewed POC with patient. All questions and concerns addressed at this time.     Problem: Safety  Goal: Will remain free from injury  Outcome: PROGRESSING AS EXPECTED  NSS in place, BLL, CLWR, using call light appropriately.

## 2020-10-14 NOTE — PROGRESS NOTES
Community Health Worker Intake  • Social determinates of health intake completed.   • Identified barriers to None.  • Contact information provided to Charley Dunn   • Has PCP appointment scheduled for Oct 27th [facility]  • Accepted Meds-To-Beds.   • Inpatient assessment completed.    CHW met with pt bedside to introduce CCM program and to give contact information. Pt does not have PCP. CHW made an establishment appointment with [facility] for Oct 27th. Pt identifies no barriers at this time. Pt expresses no other needs at this time.     Plan: CHW will follow up with pt after discharge to confirm availability for appointment and to confirm no other needs from the CCM team.

## 2020-10-14 NOTE — PROGRESS NOTES
Pt is A+Ox4   Complains of 7/10; medicated per MAR     Tolerating a regular diet   Denies N/V/D     VSS   Room air   LBM 10/13  +Void     Ambulating without staff assistance     Face is red/swollen but intact    Ears are pink/CDI    Under breasts is pink/CDI     Daily weights in place, bed locked and in the lowest position, call light within reach, upper bed rails raised, pt updated on POC, all needs met at this time     Hourly rounding in place

## 2020-10-14 NOTE — DISCHARGE SUMMARY
Discharge Summary    CHIEF COMPLAINT ON ADMISSION  No chief complaint on file.      Reason for Admission  Facial Cellulitis     Admission Date  10/11/2020    CODE STATUS  Full Code    HPI & HOSPITAL COURSE   44 yo F with h/o Crohn's disease, rheumatoid arthritis and recurrent R ear infections (recently moved from [place] with  and doesn't have PCP or local ENT yet).  Initially presented on 10/1/2020 with right-sided facial redness and ear pain, a CT scan showed no mastoiditis but evidence of otitis externa.  She was admitted and given IV antibiotics with clinical improvement and discharged on clindamycin.  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  Adjacent periauricular soft tissue suggesting cellulitis and mild parotiditis, no evidence of cristino-auricular or retromastoid abscess pocket and was discharged on Keflex.  However her symptoms worsened so she again presented to the ED.  She was transferred from [facility] for ENT consult and IV abx.  Was evaluated by ENT who felt that patient has right external otitis and possible perichondritis.  Recommended IV antibiotics and steroids.  We also felt that patient could have autoimmune process with relapsing polychondritis in the differential and recommended rheumatology outpatient follow-up.  During her hospital stay, continue to be afebrile.  Swelling on the face improved but pain continued to be an issue.  On the day of discharge, I discussed the case with ENT, and to [facility] stewardship ID committee with ENT recommended outpatient rheumatology follow-up and ID  recommended Ceftin 500 p.o. twice daily for 10 days consider that patient presented to the hospital 3 times with the same issue.  Hemodynamically clinically stable.  She was cleared for discharge from medical standpoint.    Therefore, she is discharged in guarded and stable condition to home with close outpatient follow-up.    The patient met 2-midnight criteria for an inpatient stay at the time of discharge.    Discharge Date  10/14/2020    FOLLOW UP ITEMS POST DISCHARGE  Follow-up with PCP in 1 week  Follow-up with ENT as per recommendations  PCP to refer patient to rheumatology    DISCHARGE DIAGNOSES  Active Problems:    Facial cellulitis with  otitis externa POA: Yes    Otitis externa, preauricular cellulitis with parotiditis, otitis media with mastoiditis, failed outpatient antibiotics POA: Yes    Tobacco dependence POA: Yes    Normocytic anemia POA: Unknown    Rheumatoid arthritis (HCC) POA: Yes  Resolved Problems:    * No resolved hospital problems. *      FOLLOW UP  No future appointments.  Christelle Marina APRCECILIA  [address]Eric [facility] [address]  [phone]  Call in 1 week  follow up after admission & discuss referral to rheumatology for possible polychondritis.     Christelle Marina  [address] [place] [id number] [phone]            MEDICATIONS ON DISCHARGE     Medication List      START taking these medications      Instructions   cefUROXime 500 MG Tabs  Commonly known as: CEFTIN   Take 1 Tab by mouth 2 times a day for 10 days.  Dose: 500 mg     ondansetron 4 MG Tbdp  Commonly known as: ZOFRAN ODT   Take 1 Tab by mouth every four hours as needed (give PO if no IV route available).  Dose: 4 mg     oxyCODONE immediate-release 5 MG Tabs  Commonly known as: ROXICODONE   Take 1 Tab by mouth every 3 hours as needed for Severe Pain for up to 5 days.  Dose: 5 mg     predniSONE 20 MG Tabs  Commonly known as: DELTASONE   Take 1 Tab by mouth every day for 7  days.  Dose: 20 mg        CONTINUE taking these medications      Instructions   neomycin sulf/polymyx B sulf/HC soln [id number] Soln  Commonly known as: CORTISPORIN HC SOL   Place 3 Drops in right ear 4 times a day. Administer drops to both ears.  Dose: 3 Drop     QUEtiapine 50 MG tablet  Commonly known as: SEROquel   Take 1 Tab by mouth every bedtime.  Dose: 50 mg            Allergies  Allergies   Allergen Reactions   • Pcn [Penicillins] Anaphylaxis     Tolerates Keflex 10/2020   • Toradol Hives     Migraine     • Tramadol Hives     migraine     • Bactrim [Sulfamethoxazole W-Trimethoprim] Itching     Face itching     • Stadol [Butorphanol] Unspecified     Pt reports it makes her feel like she can't wake up   • Blackberry [Rubus Fruticosus]      Oral swelling   • Fish Swelling   • Levaquin      Vomiting    • Tree Nuts Food Allergy        DIET  Orders Placed This Encounter   Procedures   • Diet Order Regular     Standing Status:   Standing     Number of Occurrences:   1     Order Specific Question:   Diet:     Answer:   Regular [1]       ACTIVITY  As tolerated.  Weight bearing as tolerated    CONSULTATIONS  ENT    PROCEDURES  None    LABORATORY  Lab Results   Component Value Date    SODIUM 137 10/14/2020    POTASSIUM 3.7 10/14/2020    CHLORIDE 100 10/14/2020    CO2 23 10/14/2020    GLUCOSE 217 (H) 10/14/2020    BUN 8 10/14/2020    CREATININE 0.72 10/14/2020        Lab Results   Component Value Date    WBC 8.2 10/14/2020    HEMOGLOBIN 11.0 (L) 10/14/2020    HEMATOCRIT 33.0 (L) 10/14/2020    PLATELETCT 264 10/14/2020        Total time of the discharge process exceeds 32 minutes.

## 2020-10-14 NOTE — PROGRESS NOTES
Discharging Patient home per physician order.  Discharged with friend.  Demonstrated understanding of discharge instructions, follow up appointments, home medications, prescriptions, home care for surgical wound, and nursing care instructions for cellulitis & otitis externa. Also included were tobacco cessation resources.  Ambulating without assistance, voiding without difficulty, pain well controlled, tolerating oral medications, oxygen saturation greater than 90% , tolerating diet. Educational handouts given and discussed.  Verbalized understanding of discharge instructions and educational handouts.  Stated several reasons why to return to ed or seek medical attention. All questions answered.  Belongings and dressing supplies with patient at time of discharge.

## 2020-10-14 NOTE — PROGRESS NOTES
Assumed care of patient. Assessment complete.  AA&Ox4. Denies CP/SOB.  Reporting 9/10 pain. Medicated per MAR. Appears to be resting comfortably  Color to R side of face is appropriate for ethnicity. Small area of redness to R ear. Bruising to abdomen and thighs  Tolerating regular diet. Denies N/V.  + void. Last BM 10/13/2020  Pt ambulates independently. Gait steady.  All needs met at this time. Call light within reach. Pt calls appropriately. Will continue to monitor.

## 2020-10-19 ENCOUNTER — PATIENT OUTREACH (OUTPATIENT)
Dept: HEALTH INFORMATION MANAGEMENT | Facility: OTHER | Age: 45
End: 2020-10-19

## 2020-11-04 ENCOUNTER — HOSPITAL ENCOUNTER (EMERGENCY)
Facility: MEDICAL CENTER | Age: 45
End: 2020-11-05
Attending: EMERGENCY MEDICINE

## 2020-11-04 DIAGNOSIS — G89.29 CHRONIC RIGHT EAR PAIN: ICD-10-CM

## 2020-11-04 DIAGNOSIS — H92.01 CHRONIC RIGHT EAR PAIN: ICD-10-CM

## 2020-11-04 DIAGNOSIS — R19.7 DIARRHEA OF PRESUMED INFECTIOUS ORIGIN: ICD-10-CM

## 2020-11-04 DIAGNOSIS — G43.009 MIGRAINE WITHOUT AURA AND WITHOUT STATUS MIGRAINOSUS, NOT INTRACTABLE: ICD-10-CM

## 2020-11-04 PROCEDURE — 99284 EMERGENCY DEPT VISIT MOD MDM: CPT

## 2020-11-04 ASSESSMENT — FIBROSIS 4 INDEX: FIB4 SCORE: 0.56

## 2020-11-05 VITALS
TEMPERATURE: 97.2 F | BODY MASS INDEX: 23.39 KG/M2 | OXYGEN SATURATION: 100 % | HEART RATE: 82 BPM | HEIGHT: 68 IN | DIASTOLIC BLOOD PRESSURE: 73 MMHG | RESPIRATION RATE: 18 BRPM | WEIGHT: 154.32 LBS | SYSTOLIC BLOOD PRESSURE: 115 MMHG

## 2020-11-05 PROCEDURE — 700111 HCHG RX REV CODE 636 W/ 250 OVERRIDE (IP): Performed by: EMERGENCY MEDICINE

## 2020-11-05 PROCEDURE — 96375 TX/PRO/DX INJ NEW DRUG ADDON: CPT

## 2020-11-05 PROCEDURE — 96374 THER/PROPH/DIAG INJ IV PUSH: CPT

## 2020-11-05 RX ORDER — PROCHLORPERAZINE EDISYLATE 5 MG/ML
10 INJECTION INTRAMUSCULAR; INTRAVENOUS ONCE
Status: COMPLETED | OUTPATIENT
Start: 2020-11-05 | End: 2020-11-05

## 2020-11-05 RX ORDER — DIPHENHYDRAMINE HYDROCHLORIDE 50 MG/ML
12.5 INJECTION INTRAMUSCULAR; INTRAVENOUS ONCE
Status: COMPLETED | OUTPATIENT
Start: 2020-11-05 | End: 2020-11-05

## 2020-11-05 RX ORDER — LOPERAMIDE HYDROCHLORIDE 2 MG/1
TABLET ORAL
Qty: 20 TAB | Refills: 0 | Status: ON HOLD | OUTPATIENT
Start: 2020-11-05 | End: 2021-04-09

## 2020-11-05 RX ORDER — BUTALBITAL, ACETAMINOPHEN AND CAFFEINE 50; 325; 40 MG/1; MG/1; MG/1
1 TABLET ORAL EVERY 4 HOURS PRN
Qty: 20 TAB | Refills: 0 | Status: SHIPPED | OUTPATIENT
Start: 2020-11-05 | End: 2020-11-08

## 2020-11-05 RX ADMIN — DIPHENHYDRAMINE HYDROCHLORIDE 12.5 MG: 50 INJECTION, SOLUTION INTRAMUSCULAR; INTRAVENOUS at 00:27

## 2020-11-05 RX ADMIN — PROCHLORPERAZINE EDISYLATE 10 MG: 5 INJECTION INTRAMUSCULAR; INTRAVENOUS at 00:20

## 2020-11-05 NOTE — DISCHARGE INSTRUCTIONS
Migraine Headache    Take Fioricet immediately at the first hint of headache.  Return for sudden onset, severe headache, headache and fever or headache and weakness.  Admit a stool sample to the [facility] lab or any lab.  Follow-up 2 days later at the [facility] or [facility] urgent care for lab results and treatment if you have C. difficile.    You had a borderline or high normal blood pressure reading today.  This does not necessarily mean you have hypertension.  Please followup with your/a primary physician for comprehensive blood pressure evaluation and yearly fasting cholesterol assessment.  BP Readings from Last 3 Encounters:   11/04/20 141/93   10/14/20 128/72   10/11/20 116/90         You have a migraine headache. Symptoms of migraines include a throbbing headache, made worse by activity. Sometimes only one side of the head hurts. Nausea, vomiting and sinus pain or stuffiness is common with migraines. Visual symptoms such as light sensitivity, blind spots, or flashing lights may also occur. Loud noises may make migraine pain worse. Migraine headaches are caused by changes in the size of the blood vessels in the head and neck. Many factors may cause this problem including:  Emotional stress, lack of sleep, and menstrual periods.  Alcohol and some drugs (such as birth control pills).  Diet factors (fasting, caffeine, food preservatives, chocolate).  Environmental factors (weather changes, bright lights, odors, smoke).  Jarring motions and loud noises may also bring on a migraine. Prevention of migraines includes dealing with the trigger factors.    Treatment may require medicines for pain, inflammation, and vomiting. Injections for pain and IV fluids can be used if the headache is very severe, or if you are vomiting repeatedly. Get plenty of rest over the next 24 hours.  Lie down in a quiet, dark place and try to sleep  Medicines to prevent the blood vessel changes may be prescribed.  For people with  frequent migraines, other medicines such as beta blockers and antidepressants may be helpful. Please see your caregiver if you are not better in 1-2 days.     SEEK IMMEDIATE MEDICAL CARE IF:  You develop a high fever, repeated vomiting, dehydration, or extreme weakness  You develop fainting, worsening headache, confusion, or seizures  You develop numbness or weakness of the limbs    [employer]® Patient Information ©2007 [email or web address]

## 2020-11-05 NOTE — ED PROVIDER NOTES
ED Provider Note    CHIEF COMPLAINT  Chief Complaint   Patient presents with   • Ear Pain     States this happens often with multiple hospital admissions- states she was admitted 3-4 weeks ago for same and was on Keflex. Noticible R external ear swelling. Denies fevers, sore throat, cough.    • Diarrhea     Denies vomiting.       HPI  Charley Dunn is a 45 y.o. female who presents for 4 days of frontal headache with nausea and light sensitivity, persistent chronic right ear and periauricular pain and new diarrhea for the last 3 days.  Patient has been to the ER 3 times in October and admitted twice for external otitis and possible perichondritis.  She just moved here from [place].  She has had chronic ear issues.  She has rheumatoid arthritis and Crohn's disease but is on no immunosuppressants.  She received multiple antibiotics during the last month including clindamycin.  Her last course was Ceftin and steroids which she finished 4 days ago.  No fever.  No cough or coronavirus.  She has had C. difficile in the past and her diarrhea feels similar.  She has mild episodic abdominal pain when she has a bowel movement.  She was referred for ENT follow-up but has no appointment till February, she was referred to [facility] but declined because her significant other makes too much money.  She was referred to rheumatology but has no appointment yet.    REVIEW OF SYSTEMS  Pertinent positives include: Headache, nausea, photophobia, chronic right ear pain, diarrhea, abdominal pain.  Pertinent negatives include: Weakness, numbness, cough, fever.    PAST MEDICAL HISTORY  Past Medical History:   Diagnosis Date   • Anxiety    • Bradycardia    • Crohn's disease (HCC)    • MRSA (methicillin resistant Staphylococcus aureus)     to R face   • RA (rheumatoid arthritis) ([facility])        SOCIAL HISTORY  Social History     Tobacco Use   • Smoking status: Current Every Day Smoker     Packs/day: 0.50     Years: 32.00     Pack years:  "16.00     Types: Cigarettes   • Smokeless tobacco: Never Used   Substance Use Topics   • Alcohol use: Not Currently     Comment: rare   • Drug use: Never       CURRENT MEDICATIONS    •  ondansetron (ZOFRAN ODT) 4 MG TABLET DISPERSIBLE, Take 1 Tab by mouth every four hours as needed (give PO if no IV route available)., Disp: 10 Tab, Rfl: 0  •  neomycin sulf/polymyx B sulf/HC soln (CORTISPORIN HC SOL) 3.5-56041-9 Solution, Place 3 Drops in right ear 4 times a day. Administer drops to both ears., Disp: 10 mL, Rfl: 0  •  QUEtiapine (SEROQUEL) 50 MG tablet, Take 1 Tab by mouth every bedtime., Disp: 30 Tab, Rfl: 0      ALLERGIES  Allergies   Allergen Reactions   • Pcn [Penicillins] Anaphylaxis     Tolerates Keflex 10/2020   • Toradol Hives     Migraine     • Tramadol Hives     migraine     • Bactrim [Sulfamethoxazole W-Trimethoprim] Itching     Face itching     • Stadol [Butorphanol] Unspecified     Pt reports it makes her feel like she can't wake up   • Blackberry [Rubus Fruticosus]      Oral swelling   • Fish Swelling   • Levaquin      Vomiting    • Tree Nuts Food Allergy        PHYSICAL EXAM  VITAL SIGNS: /93   Pulse 91   Temp 36.2 °C (97.2 °F) (Temporal)   Resp 18   Ht 1.727 m (5' 8\")   Wt 70 kg (154 lb 5.2 oz)   SpO2 98%   BMI 23.46 kg/m² . Reviewed and elevated blood pressure, afebrile  Constitutional :  Well developed, Well nourished, appearing, moderately obese.   HNT: atraumatic, wearing a mask.  No TMJ crepitus face symmetric, partially edentulous  Ears: external ears normal.  Small 2 x 2 centimeter patch pink skin under right ear.  Diffuse periauricular tenderness including over the mastoid.  Mild discomfort with traction on the pinna.  No erythema of the pinna.  Extra level canal without erythema or edema.  Tympanic membrane pearly.  Eyes: pupils reactive without eye discharge nor conjunctival hyperemia.  Neck: Normal range of motion, No tenderness, Supple, No stridor.   Lymphatic: No cervical " adenopathy.   Cardiovascular: Regular rhythm, No murmurs, No rubs, No gallops.  No cyanosis.   Respiratory: No rales, rhonchi, wheeze, cough  Abdomen:  Soft, nontender  Skin: Warm, dry, no erythema, no rash.   Musculoskeletal: no limb deformities.  Neurologic: Cranial nerves II through XII are intact, grasp, biceps, extensor hallucis longus and ankle plantarflexion symmetric.    RADIOLOGY:  Recent CT head studies reviewed and unremarkable, second suggested possible mastoid congestion    LABORATORY:  Results for orders placed or performed during the hospital encounter of 10/11/20   Routine (COVID/SARS COV-2 In-House PCR up to 24 hours)    Specimen: Nasopharyngeal; Respirate   Result Value Ref Range    COVID Order Status Received    Lactic Acid -STAT Once   Result Value Ref Range    Lactic Acid 1.1 0.5 - 2.0 mmol/L   Lactic Acid Every four hours after STAT order   Result Value Ref Range    Lactic Acid 1.2 0.5 - 2.0 mmol/L   Basic Metabolic Panel (BMP)   Result Value Ref Range    Sodium 141 135 - 145 mmol/L    Potassium 3.5 (L) 3.6 - 5.5 mmol/L    Chloride 108 96 - 112 mmol/L    Co2 25 20 - 33 mmol/L    Glucose 96 65 - 99 mg/dL    Bun 8 8 - 22 mg/dL    Creatinine 0.63 0.50 - 1.40 mg/dL    Calcium 8.5 8.5 - 10.5 mg/dL    Anion Gap 8.0 7.0 - 16.0   CBC with Differential   Result Value Ref Range    WBC 7.0 4.8 - 10.8 K/uL    RBC 4.04 (L) 4.20 - 5.40 M/uL    Hemoglobin 11.2 (L) 12.0 - 16.0 g/dL    Hematocrit 35.3 (L) 37.0 - 47.0 %    MCV 87.4 81.4 - 97.8 fL    MCH 27.7 27.0 - 33.0 pg    MCHC 31.7 (L) 33.6 - 35.0 g/dL    RDW 46.7 35.9 - 50.0 fL    Platelet Count 237 164 - 446 K/uL    MPV 9.1 9.0 - 12.9 fL    Neutrophils-Polys 46.70 44.00 - 72.00 %    Lymphocytes 44.00 (H) 22.00 - 41.00 %    Monocytes 4.70 0.00 - 13.40 %    Eosinophils 3.70 0.00 - 6.90 %    Basophils 0.60 0.00 - 1.80 %    Immature Granulocytes 0.30 0.00 - 0.90 %    Nucleated RBC 0.00 /100 WBC    Neutrophils (Absolute) 3.28 2.00 - 7.15 K/uL    Lymphs  (Absolute) 3.09 1.00 - 4.80 K/uL    Monos (Absolute) 0.33 0.00 - 0.85 K/uL    Eos (Absolute) 0.26 0.00 - 0.51 K/uL    Baso (Absolute) 0.04 0.00 - 0.12 K/uL    Immature Granulocytes (abs) 0.02 0.00 - 0.11 K/uL    NRBC (Absolute) 0.00 K/uL   CRP HIGH SENSITIVE (CARDIAC)   Result Value Ref Range    C Reactive Protein High Sensitive 4.2 0.0 - 7.5 mg/L   CRP QUANTITIVE (NON-CARDIAC)   Result Value Ref Range    Stat C-Reactive Protein 0.48 0.00 - 0.75 mg/dL   MAGNESIUM   Result Value Ref Range    Magnesium 2.2 1.5 - 2.5 mg/dL   Sed Rate   Result Value Ref Range    Sed Rate Westergren 25 (H) 0 - 20 mm/hour       INTERVENTIONS:  Medications   prochlorperazine (COMPAZINE) injection 10 mg (has no administration in time range)   diphenhydrAMINE (BENADRYL) injection 12.5 mg (has no administration in time range)     Response: Improvement in headache    COURSE & MEDICAL DECISION MAKING  Well appearing patient presents with what appears to be a migraine headache.  She has chronic ear and periauricular pain on the right.  She has been admitted twice in the last month for otitis externa and possible perichondritis but she appears to have neither at this time.  There is no otitis media.  Has had 2 recent head CTs and is very unlikely her headache is a subarachnoid hemorrhage, venous sinus thrombosis, carotid dissection.  He has diarrhea and is at high risk for C. difficile given multiple antibiotics use in the last month which included clindamycin.  She has not been able to arrange follow-up.    This patient has borderline or elevated blood pressure as recorded above and was instructed to followup with primary physician for comprehensive blood pressure evaluation and yearly fasting cholesterol assessment according to to CMS protocol.    PLAN:  Ibuprofen and Tylenol for headache  Imodium  Diarrhea handout handout given  Outpatient C. difficile testing unable to be tested here  Return for declined headache, headache and fever, headache  and neurologic deficit, bloody diarrhea, dizziness, uncontrolled vomiting, severe abdominal pain    [facility] for C. difficile testing results and treatment    CONDITION:  Good.    FINAL IMPRESSION:  1. Migraine without aura and without status migrainosus, not intractable    2. Diarrhea of presumed infectious origin    3. Chronic right ear pain          Electronically signed by: Sami Kasper M.D., 11/5/2020

## 2020-11-05 NOTE — ED NOTES
Pt provided with discharge paper work and prescriptions for medication and lab work. Pt educated not to take imodium unitl stool sample has been provided. PT ambulated out of ER.

## 2020-11-27 ENCOUNTER — HOSPITAL ENCOUNTER (EMERGENCY)
Facility: MEDICAL CENTER | Age: 45
End: 2020-11-27
Attending: EMERGENCY MEDICINE

## 2020-11-27 VITALS
HEART RATE: 83 BPM | OXYGEN SATURATION: 97 % | HEIGHT: 68 IN | DIASTOLIC BLOOD PRESSURE: 83 MMHG | RESPIRATION RATE: 14 BRPM | SYSTOLIC BLOOD PRESSURE: 122 MMHG | TEMPERATURE: 97.3 F | WEIGHT: 154.1 LBS | BODY MASS INDEX: 23.36 KG/M2

## 2020-11-27 DIAGNOSIS — R10.12 LUQ ABDOMINAL PAIN: ICD-10-CM

## 2020-11-27 DIAGNOSIS — L03.211 CELLULITIS OF FACE: ICD-10-CM

## 2020-11-27 DIAGNOSIS — R19.7 DIARRHEA, UNSPECIFIED TYPE: ICD-10-CM

## 2020-11-27 LAB
ALBUMIN SERPL BCP-MCNC: 3.6 G/DL (ref 3.2–4.9)
ALBUMIN/GLOB SERPL: 1.2 G/DL
ALP SERPL-CCNC: 111 U/L (ref 30–99)
ALT SERPL-CCNC: 11 U/L (ref 2–50)
ANION GAP SERPL CALC-SCNC: 10 MMOL/L (ref 7–16)
AST SERPL-CCNC: 15 U/L (ref 12–45)
BASOPHILS # BLD AUTO: 0.3 % (ref 0–1.8)
BASOPHILS # BLD: 0.03 K/UL (ref 0–0.12)
BILIRUB SERPL-MCNC: 0.4 MG/DL (ref 0.1–1.5)
BUN SERPL-MCNC: 9 MG/DL (ref 8–22)
CALCIUM SERPL-MCNC: 8.1 MG/DL (ref 8.4–10.2)
CHLORIDE SERPL-SCNC: 111 MMOL/L (ref 96–112)
CO2 SERPL-SCNC: 21 MMOL/L (ref 20–33)
CREAT SERPL-MCNC: 0.61 MG/DL (ref 0.5–1.4)
EOSINOPHIL # BLD AUTO: 0.3 K/UL (ref 0–0.51)
EOSINOPHIL NFR BLD: 3.5 % (ref 0–6.9)
ERYTHROCYTE [DISTWIDTH] IN BLOOD BY AUTOMATED COUNT: 43.4 FL (ref 35.9–50)
GLOBULIN SER CALC-MCNC: 3 G/DL (ref 1.9–3.5)
GLUCOSE SERPL-MCNC: 83 MG/DL (ref 65–99)
HCT VFR BLD AUTO: 36.7 % (ref 37–47)
HGB BLD-MCNC: 12.2 G/DL (ref 12–16)
IMM GRANULOCYTES # BLD AUTO: 0.01 K/UL (ref 0–0.11)
IMM GRANULOCYTES NFR BLD AUTO: 0.1 % (ref 0–0.9)
LIPASE SERPL-CCNC: 20 U/L (ref 7–58)
LYMPHOCYTES # BLD AUTO: 3.08 K/UL (ref 1–4.8)
LYMPHOCYTES NFR BLD: 35.7 % (ref 22–41)
MCH RBC QN AUTO: 27.6 PG (ref 27–33)
MCHC RBC AUTO-ENTMCNC: 33.2 G/DL (ref 33.6–35)
MCV RBC AUTO: 83 FL (ref 81.4–97.8)
MONOCYTES # BLD AUTO: 0.54 K/UL (ref 0–0.85)
MONOCYTES NFR BLD AUTO: 6.3 % (ref 0–13.4)
NEUTROPHILS # BLD AUTO: 4.66 K/UL (ref 2–7.15)
NEUTROPHILS NFR BLD: 54.1 % (ref 44–72)
NRBC # BLD AUTO: 0 K/UL
NRBC BLD-RTO: 0 /100 WBC
PLATELET # BLD AUTO: 276 K/UL (ref 164–446)
PMV BLD AUTO: 9.1 FL (ref 9–12.9)
POTASSIUM SERPL-SCNC: 3.5 MMOL/L (ref 3.6–5.5)
PROT SERPL-MCNC: 6.6 G/DL (ref 6–8.2)
RBC # BLD AUTO: 4.42 M/UL (ref 4.2–5.4)
SODIUM SERPL-SCNC: 142 MMOL/L (ref 135–145)
WBC # BLD AUTO: 8.6 K/UL (ref 4.8–10.8)

## 2020-11-27 PROCEDURE — 80053 COMPREHEN METABOLIC PANEL: CPT

## 2020-11-27 PROCEDURE — A9270 NON-COVERED ITEM OR SERVICE: HCPCS | Performed by: EMERGENCY MEDICINE

## 2020-11-27 PROCEDURE — 700102 HCHG RX REV CODE 250 W/ 637 OVERRIDE(OP): Performed by: EMERGENCY MEDICINE

## 2020-11-27 PROCEDURE — 96372 THER/PROPH/DIAG INJ SC/IM: CPT

## 2020-11-27 PROCEDURE — 83690 ASSAY OF LIPASE: CPT

## 2020-11-27 PROCEDURE — 85025 COMPLETE CBC W/AUTO DIFF WBC: CPT

## 2020-11-27 PROCEDURE — 700105 HCHG RX REV CODE 258: Performed by: EMERGENCY MEDICINE

## 2020-11-27 PROCEDURE — 99284 EMERGENCY DEPT VISIT MOD MDM: CPT

## 2020-11-27 PROCEDURE — 700111 HCHG RX REV CODE 636 W/ 250 OVERRIDE (IP): Performed by: EMERGENCY MEDICINE

## 2020-11-27 RX ORDER — SODIUM CHLORIDE 9 MG/ML
1000 INJECTION, SOLUTION INTRAVENOUS ONCE
Status: COMPLETED | OUTPATIENT
Start: 2020-11-27 | End: 2020-11-27

## 2020-11-27 RX ORDER — HYDROCODONE BITARTRATE AND ACETAMINOPHEN 5; 325 MG/1; MG/1
1 TABLET ORAL ONCE
Status: COMPLETED | OUTPATIENT
Start: 2020-11-27 | End: 2020-11-27

## 2020-11-27 RX ORDER — PREDNISONE 20 MG/1
20 TABLET ORAL DAILY
Qty: 5 TAB | Refills: 0 | Status: SHIPPED | OUTPATIENT
Start: 2020-11-27 | End: 2020-12-02

## 2020-11-27 RX ORDER — CEPHALEXIN 500 MG/1
500 CAPSULE ORAL 4 TIMES DAILY
Qty: 40 CAP | Refills: 0 | Status: SHIPPED | OUTPATIENT
Start: 2020-11-27 | End: 2020-12-07

## 2020-11-27 RX ORDER — LOPERAMIDE HYDROCHLORIDE 2 MG/1
2 CAPSULE ORAL 4 TIMES DAILY PRN
Qty: 12 CAP | Refills: 0 | Status: ON HOLD | OUTPATIENT
Start: 2020-11-27 | End: 2021-04-09

## 2020-11-27 RX ORDER — DICYCLOMINE HYDROCHLORIDE 10 MG/ML
20 INJECTION INTRAMUSCULAR ONCE
Status: COMPLETED | OUTPATIENT
Start: 2020-11-27 | End: 2020-11-27

## 2020-11-27 RX ADMIN — SODIUM CHLORIDE 1000 ML: 9 INJECTION, SOLUTION INTRAVENOUS at 21:08

## 2020-11-27 RX ADMIN — HYDROCODONE BITARTRATE AND ACETAMINOPHEN 1 TABLET: 5; 325 TABLET ORAL at 21:09

## 2020-11-27 RX ADMIN — DICYCLOMINE HYDROCHLORIDE 20 MG: 20 INJECTION, SOLUTION INTRAMUSCULAR at 21:09

## 2020-11-27 ASSESSMENT — PAIN SCALES - WONG BAKER: WONGBAKER_NUMERICALRESPONSE: HURTS A WHOLE LOT

## 2020-11-27 ASSESSMENT — FIBROSIS 4 INDEX: FIB4 SCORE: 0.56

## 2020-11-28 NOTE — ED NOTES
Discharge instructions provided. PIV removed. Pt verbalized the understanding of discharge instructions to follow up with PCP and specialist and to return to ER if condition worsens.  Pt ambulated out of ER without difficulty.

## 2020-11-28 NOTE — ED TRIAGE NOTES
Pt ambulats to triage w/ steady gait.  at side.    Pt states redness and extreme pain & tenderness to R ear and area surrounding ear.  Pt states recurring ear infections which cause headaches, abdominal pain, nausea & diarrhea.    Pt was seen for like s/s 2 months ago.

## 2020-11-28 NOTE — ED PROVIDER NOTES
ED Provider Note    CHIEF COMPLAINT  Chief Complaint   Patient presents with   • Ear Pain     chronic, recurring infection   • LUQ Pain     part of the chronic ear infection   • Nausea     part of the chronic ear infection       HPI  Charley Dunn is a 45 y.o. female who presents recurrent redness and increasing pain to her right ear and face.  Patient also complains of left upper quadrant abdominal pain described as cramping, nausea as well as some diarrhea.  Patient has had this problem with her ear for over 13 years.  Was recently admitted to the hospital in October this year.  She states that she has been off antibiotics since the beginning of the month and over the last 2 days started having increasing pain and redness.  She denies any drainage from the ear.  Or changes in hearing loss.  She denies any fever.      As far as the patient's abdominal pain she describes this as a cramping sensation she thinks it may been related to eating Thanksgiving dinner last night given the fact that she has had her gallbladder taken out she said sometimes certain foods will cause her to have diarrhea.  She states that since her last visit in the emergency department her diarrhea had stopped she never did a C. difficile but states that her diarrhea does not smell like C. difficile which she has had in the past.        REVIEW OF SYSTEMS  Pertinent positives include recurrent ear pain, nausea, diarrhea, abdominal cramping. Pertinent negatives include denies fever, chills, cough, chest pain, shortness of breath, vomiting, or hematochezia.     PAST MEDICAL HISTORY   has a past medical history of Anxiety, Bradycardia, Crohn's disease ([facility]), MRSA (methicillin resistant Staphylococcus aureus), and RA (rheumatoid arthritis) ([facility]).    SOCIAL HISTORY  Social History     Tobacco Use   • Smoking status: Current Every Day Smoker     Packs/day: 0.50     Years: 32.00     Pack years: 16.00     Types: Cigarettes   • Smokeless tobacco:  "Never Used   Substance and Sexual Activity   • Alcohol use: Not Currently     Comment: rare   • Drug use: Never   • Sexual activity: Not on file       SURGICAL HISTORY   has a past surgical history that includes other abdominal surgery and other.    CURRENT MEDICATIONS  Home Medications    **Home medications have not yet been reviewed for this encounter**         ALLERGIES  Allergies   Allergen Reactions   • Pcn [Penicillins] Anaphylaxis     Tolerates Keflex 10/2020   • Toradol Hives     Migraine     • Tramadol Hives     migraine     • Bactrim [Sulfamethoxazole W-Trimethoprim] Itching     Face itching     • Stadol [Butorphanol] Unspecified     Pt reports it makes her feel like she can't wake up   • Blackberry [Rubus Fruticosus]      Oral swelling   • Fish Swelling   • Levaquin      Vomiting    • Tree Nuts Food Allergy        PHYSICAL EXAM  VITAL SIGNS: /83   Pulse 83   Temp 36.3 °C (97.3 °F) (Temporal)   Resp 14   Ht 1.727 m (5' 8\")   Wt 69.9 kg (154 lb 1.6 oz)   SpO2 97%   BMI 23.43 kg/m²   Constitutional: Well developed, Well nourished, mild distress.   HENT: Normocephalic, Atraumatic, patient has mild erythema over tragus with erythema extending about 5 cm x 2 cm inferiorly below the ear there is slight induration.  Neck, slight enlarged lymph node on the right side  Eyes: Conjunctiva normal, No discharge.    Cardiovascular: Normal heart rate, Normal rhythm, No murmurs, equal pulses.   Pulmonary: Normal breath sounds, No respiratory distress, No wheezing, No rales, No rhonchi.  Chest: No chest wall tenderness or deformity.   Abdomen:Soft, No tenderness, No masses, no rebound, no guarding.   Back: No CVA tenderness.   Musculoskeletal: No major deformities noted, No tenderness.   Skin: Warm, Dry, No erythema, No rash.   Neurologic: Alert & oriented x 3, Normal motor function,  No focal deficits noted.   Psychiatric: Affect normal, Judgment normal, Mood normal.           RADIOLOGY/PROCEDURES  No orders " to display       Laboratory tests  Results for orders placed or performed during the hospital encounter of 11/27/20   CBC WITH DIFFERENTIAL   Result Value Ref Range    WBC 8.6 4.8 - 10.8 K/uL    RBC 4.42 4.20 - 5.40 M/uL    Hemoglobin 12.2 12.0 - 16.0 g/dL    Hematocrit 36.7 (L) 37.0 - 47.0 %    MCV 83.0 81.4 - 97.8 fL    MCH 27.6 27.0 - 33.0 pg    MCHC 33.2 (L) 33.6 - 35.0 g/dL    RDW 43.4 35.9 - 50.0 fL    Platelet Count 276 164 - 446 K/uL    MPV 9.1 9.0 - 12.9 fL    Neutrophils-Polys 54.10 44.00 - 72.00 %    Lymphocytes 35.70 22.00 - 41.00 %    Monocytes 6.30 0.00 - 13.40 %    Eosinophils 3.50 0.00 - 6.90 %    Basophils 0.30 0.00 - 1.80 %    Immature Granulocytes 0.10 0.00 - 0.90 %    Nucleated RBC 0.00 /100 WBC    Neutrophils (Absolute) 4.66 2.00 - 7.15 K/uL    Lymphs (Absolute) 3.08 1.00 - 4.80 K/uL    Monos (Absolute) 0.54 0.00 - 0.85 K/uL    Eos (Absolute) 0.30 0.00 - 0.51 K/uL    Baso (Absolute) 0.03 0.00 - 0.12 K/uL    Immature Granulocytes (abs) 0.01 0.00 - 0.11 K/uL    NRBC (Absolute) 0.00 K/uL   COMP METABOLIC PANEL   Result Value Ref Range    Sodium 142 135 - 145 mmol/L    Potassium 3.5 (L) 3.6 - 5.5 mmol/L    Chloride 111 96 - 112 mmol/L    Co2 21 20 - 33 mmol/L    Anion Gap 10.0 7.0 - 16.0    Glucose 83 65 - 99 mg/dL    Bun 9 8 - 22 mg/dL    Creatinine 0.61 0.50 - 1.40 mg/dL    Calcium 8.1 (L) 8.4 - 10.2 mg/dL    AST(SGOT) 15 12 - 45 U/L    ALT(SGPT) 11 2 - 50 U/L    Alkaline Phosphatase 111 (H) 30 - 99 U/L    Total Bilirubin 0.4 0.1 - 1.5 mg/dL    Albumin 3.6 3.2 - 4.9 g/dL    Total Protein 6.6 6.0 - 8.2 g/dL    Globulin 3.0 1.9 - 3.5 g/dL    A-G Ratio 1.2 g/dL   LIPASE   Result Value Ref Range    Lipase 20 7 - 58 U/L   ESTIMATED GFR   Result Value Ref Range    GFR If African American >60 >60 mL/min/1.73 m 2    GFR If Non African American >60 >60 mL/min/1.73 m 2         Medications given in the ER  Medications   dicyclomine (BENTYL) injection 20 mg (20 mg Intramuscular Given 11/27/20 2109)   NS  infusion 1,000 mL (0 mL Intravenous Stopped 11/27/20 2230)   HYDROcodone-acetaminophen (NORCO) 5-325 MG per tablet 1 Tab (1 Tab Oral Given 11/27/20 2109)       COURSE & MEDICAL DECISION MAKING  Pertinent Labs & Imaging studies reviewed. (See chart for details)  Differential includes recurrent cellulitis of the face, recurrent perichondritis,     Reviewed the patient's recent hospitalization as well as recommendations by ENT.    I verified that the patient was wearing a mask and I was wearing appropriate PPE every time I entered the room. The patient's mask was on the patient at all times during my encounter except for a brief view of the oropharynx.      Medical decision making: At this point time I think the patient may have some recurrent cellulitis versus recurrent perichondritis.  After reviewing the patient's ENT recommendations we will start the patient on prednisone for an anti-inflammatory as well as Keflex for possible skin infection.  I actually saw the patient when she was admitted last time and this does not appear to be as bad at that time.  Patient does not have any drainage from her ear I can see her TM do not think this is acute otitis media.  As far as the patient's abdominal pain she is not having significant pain or tenderness.  Her white count is normal she is she will be unable to give us a stool sample to do C. difficile testing.         The patient will return for new or worsening symptoms and is stable at the time of discharge.    The patient is referred to a primary physician for blood pressure management, diabetic screening, and for all other preventative health concerns.      DISPOSITION:  Patient will be discharged home in stable condition.    FOLLOW UP:  Ar Barnett M.D.  [address] [facility] [address]  [phone]    Schedule an appointment as soon as possible for a visit in 3 days      [facility]  [address] [facility] [phone]  In 2  days  If not getting better in 2 days.    [facility]  [address] [address]  [phone]    If you need a doctor    [place] [id number] [facility] [id number] [phone]    If you need a doctor      OUTPATIENT MEDICATIONS:  Discharge Medication List as of 11/27/2020 10:38 PM      START taking these medications    Details   cephALEXin (KEFLEX) 500 MG Cap Take 1 Cap by mouth 4 times a day for 10 days., Disp-40 Cap, R-0, Print Rx Paper      predniSONE (DELTASONE) 20 MG Tab Take 1 Tab by mouth every day for 5 days., Disp-5 Tab, R-0, Print Rx Paper      loperamide (IMODIUM) 2 MG Cap Take 1 Cap by mouth 4 times a day as needed for Diarrhea., Disp-12 Cap, R-0, Print Rx Paper             FINAL IMPRESSION  1. Cellulitis of face    2. Diarrhea, unspecified type    3. LUQ abdominal pain        Electronically signed by: Rico Chirinos M.D., 11/27/2020 8:08 PM  This record was made with a voice recognition software. The software is not perfect. I have tried to correct any grammar, spelling or context errors to the best of my ability, but errors may still remain. Interpretation of this chart should be taken in this context.

## 2020-12-03 ENCOUNTER — HOSPITAL ENCOUNTER (EMERGENCY)
Facility: MEDICAL CENTER | Age: 45
End: 2020-12-03
Attending: EMERGENCY MEDICINE

## 2020-12-03 VITALS
OXYGEN SATURATION: 99 % | HEART RATE: 99 BPM | WEIGHT: 154.1 LBS | SYSTOLIC BLOOD PRESSURE: 121 MMHG | DIASTOLIC BLOOD PRESSURE: 90 MMHG | HEIGHT: 68 IN | BODY MASS INDEX: 23.36 KG/M2 | TEMPERATURE: 97.5 F | RESPIRATION RATE: 16 BRPM

## 2020-12-03 DIAGNOSIS — S39.012A STRAIN OF LUMBAR REGION, INITIAL ENCOUNTER: ICD-10-CM

## 2020-12-03 PROCEDURE — 700102 HCHG RX REV CODE 250 W/ 637 OVERRIDE(OP): Performed by: EMERGENCY MEDICINE

## 2020-12-03 PROCEDURE — 99281 EMR DPT VST MAYX REQ PHY/QHP: CPT

## 2020-12-03 PROCEDURE — A9270 NON-COVERED ITEM OR SERVICE: HCPCS | Performed by: EMERGENCY MEDICINE

## 2020-12-03 RX ORDER — PREDNISONE 20 MG/1
60 TABLET ORAL DAILY
Qty: 15 TAB | Refills: 0 | Status: SHIPPED | OUTPATIENT
Start: 2020-12-03 | End: 2020-12-08

## 2020-12-03 RX ORDER — OXYCODONE HYDROCHLORIDE AND ACETAMINOPHEN 5; 325 MG/1; MG/1
1 TABLET ORAL ONCE
Status: COMPLETED | OUTPATIENT
Start: 2020-12-03 | End: 2020-12-03

## 2020-12-03 RX ORDER — PREDNISONE 20 MG/1
60 TABLET ORAL ONCE
Status: DISCONTINUED | OUTPATIENT
Start: 2020-12-03 | End: 2020-12-03 | Stop reason: HOSPADM

## 2020-12-03 RX ORDER — CYCLOBENZAPRINE HCL 10 MG
10 TABLET ORAL 3 TIMES DAILY PRN
Qty: 10 TAB | Refills: 0 | Status: SHIPPED | OUTPATIENT
Start: 2020-12-03 | End: 2020-12-06

## 2020-12-03 RX ORDER — PREDNISONE 20 MG/1
60 TABLET ORAL DAILY
Status: DISCONTINUED | OUTPATIENT
Start: 2020-12-04 | End: 2020-12-03

## 2020-12-03 RX ADMIN — OXYCODONE AND ACETAMINOPHEN 1 TABLET: 5; 325 TABLET ORAL at 21:45

## 2020-12-03 ASSESSMENT — FIBROSIS 4 INDEX: FIB4 SCORE: 0.77

## 2020-12-04 ENCOUNTER — HOSPITAL ENCOUNTER (INPATIENT)
Dept: HOSPITAL 8 - ED | Age: 45
LOS: 3 days | Discharge: HOME | DRG: 603 | End: 2020-12-07
Attending: HOSPITALIST | Admitting: HOSPITALIST
Payer: COMMERCIAL

## 2020-12-04 VITALS — BODY MASS INDEX: 24.86 KG/M2 | HEIGHT: 68 IN | WEIGHT: 164.02 LBS

## 2020-12-04 DIAGNOSIS — Z92.21: ICD-10-CM

## 2020-12-04 DIAGNOSIS — Z79.899: ICD-10-CM

## 2020-12-04 DIAGNOSIS — Z82.5: ICD-10-CM

## 2020-12-04 DIAGNOSIS — Z80.49: ICD-10-CM

## 2020-12-04 DIAGNOSIS — F17.200: ICD-10-CM

## 2020-12-04 DIAGNOSIS — Z86.14: ICD-10-CM

## 2020-12-04 DIAGNOSIS — K02.9: ICD-10-CM

## 2020-12-04 DIAGNOSIS — Z86.19: ICD-10-CM

## 2020-12-04 DIAGNOSIS — Z90.49: ICD-10-CM

## 2020-12-04 DIAGNOSIS — F41.9: ICD-10-CM

## 2020-12-04 DIAGNOSIS — Z85.3: ICD-10-CM

## 2020-12-04 DIAGNOSIS — K50.90: ICD-10-CM

## 2020-12-04 DIAGNOSIS — H71.21: ICD-10-CM

## 2020-12-04 DIAGNOSIS — L03.211: Primary | ICD-10-CM

## 2020-12-04 DIAGNOSIS — Z88.0: ICD-10-CM

## 2020-12-04 DIAGNOSIS — Z82.49: ICD-10-CM

## 2020-12-04 DIAGNOSIS — M06.9: ICD-10-CM

## 2020-12-04 DIAGNOSIS — H70.11: ICD-10-CM

## 2020-12-04 LAB
ANION GAP SERPL CALC-SCNC: 8 MMOL/L (ref 5–15)
CALCIUM SERPL-MCNC: 9.1 MG/DL (ref 8.5–10.1)
CHLORIDE SERPL-SCNC: 112 MMOL/L (ref 98–107)
CREAT SERPL-MCNC: 0.78 MG/DL (ref 0.55–1.02)

## 2020-12-04 PROCEDURE — 87040 BLOOD CULTURE FOR BACTERIA: CPT

## 2020-12-04 PROCEDURE — 86140 C-REACTIVE PROTEIN: CPT

## 2020-12-04 PROCEDURE — 85651 RBC SED RATE NONAUTOMATED: CPT

## 2020-12-04 PROCEDURE — 83735 ASSAY OF MAGNESIUM: CPT

## 2020-12-04 PROCEDURE — 80053 COMPREHEN METABOLIC PANEL: CPT

## 2020-12-04 PROCEDURE — 80048 BASIC METABOLIC PNL TOTAL CA: CPT

## 2020-12-04 PROCEDURE — 99285 EMERGENCY DEPT VISIT HI MDM: CPT

## 2020-12-04 PROCEDURE — 96375 TX/PRO/DX INJ NEW DRUG ADDON: CPT

## 2020-12-04 PROCEDURE — 36415 COLL VENOUS BLD VENIPUNCTURE: CPT

## 2020-12-04 PROCEDURE — 85025 COMPLETE CBC W/AUTO DIFF WBC: CPT

## 2020-12-04 PROCEDURE — 80202 ASSAY OF VANCOMYCIN: CPT

## 2020-12-04 PROCEDURE — 96374 THER/PROPH/DIAG INJ IV PUSH: CPT

## 2020-12-04 PROCEDURE — 84443 ASSAY THYROID STIM HORMONE: CPT

## 2020-12-04 PROCEDURE — 70480 CT ORBIT/EAR/FOSSA W/O DYE: CPT

## 2020-12-04 NOTE — ED NOTES
Pt medicated per MAR  Pt discharged home. Pt verbalized understand of discharge and medication instructions, all questions addressed. Pt advised to follow-up with PCP or return to ED for any new or worsening of symptoms. Pt is ambulating well and steady on feet. VSS.

## 2020-12-04 NOTE — ED PROVIDER NOTES
ED Provider Note    CHIEF COMPLAINT  Chief Complaint   Patient presents with   • Low Back Pain     since this am; denies trauma or injury; pt denies numbness, tingling, or radiating pain; the pain is described as constant; pt denies loss of bowel or bladder control       HPI  Charley Dunn is a 45 y.o. female who presents with low back pain.  The patient states she developed left low back pain this morning.  She does not remember injuring herself.  She does have some radicular pain down the left leg.  She states the pain is severe and worse with certain movements.  She does not have any dysuria nor hematuria.  She has not had any associated fevers.  She is unaware of any sick contacts.    REVIEW OF SYSTEMS  See HPI for further details. All other systems are negative.     PAST MEDICAL HISTORY  Past Medical History:   Diagnosis Date   • Anxiety    • Bradycardia    • Crohn's disease (HCC)    • MRSA (methicillin resistant Staphylococcus aureus)     to R face   • RA (rheumatoid arthritis) ([facility])        FAMILY HISTORY  [unfilled]    SOCIAL HISTORY  Social History     Socioeconomic History   • Marital status:      Spouse name: Not on file   • Number of children: Not on file   • Years of education: Not on file   • Highest education level: Not on file   Occupational History   • Not on file   Social Needs   • Financial resource strain: Not very hard   • Food insecurity     Worry: Never true     Inability: Never true   • Transportation needs     Medical: No     Non-medical: No   Tobacco Use   • Smoking status: Current Every Day Smoker     Packs/day: 0.50     Years: 32.00     Pack years: 16.00     Types: Cigarettes   • Smokeless tobacco: Never Used   Substance and Sexual Activity   • Alcohol use: Not Currently     Comment: rare   • Drug use: Never   • Sexual activity: Not on file   Lifestyle   • Physical activity     Days per week: Not on file     Minutes per session: Not on file   • Stress: Not on file  "  Relationships   • Social connections     Talks on phone: Not on file     Gets together: Not on file     Attends Buddhist service: Not on file     Active member of club or organization: Not on file     Attends meetings of clubs or organizations: Not on file     Relationship status: Not on file   • Intimate partner violence     Fear of current or ex partner: Not on file     Emotionally abused: Not on file     Physically abused: Not on file     Forced sexual activity: Not on file   Other Topics Concern   • Not on file   Social History Narrative   • Not on file       SURGICAL HISTORY  Past Surgical History:   Procedure Laterality Date   • OTHER     • OTHER ABDOMINAL SURGERY         CURRENT MEDICATIONS  Home Medications    **Home medications have not yet been reviewed for this encounter**         ALLERGIES  Allergies   Allergen Reactions   • Pcn [Penicillins] Anaphylaxis     Tolerates Keflex 10/2020   • Toradol Hives     Migraine     • Tramadol Hives     migraine     • Bactrim [Sulfamethoxazole W-Trimethoprim] Itching     Face itching     • Stadol [Butorphanol] Unspecified     Pt reports it makes her feel like she can't wake up   • Blackberry [Rubus Fruticosus]      Oral swelling   • Fish Swelling   • Levaquin      Vomiting    • Tree Nuts Food Allergy        PHYSICAL EXAM  VITAL SIGNS: /90   Pulse 99   Temp 36.4 °C (97.5 °F) (Temporal)   Resp 16   Ht 1.727 m (5' 8\")   Wt 69.9 kg (154 lb 1.6 oz)   SpO2 99%   BMI 23.43 kg/m²       Constitutional: Well developed, Well nourished, No acute distress, Non-toxic appearance.   HENT: Normocephalic, Atraumatic, Bilateral external ears normal, Oropharynx moist, No oral exudates, Nose normal.   Eyes: PERRLA, EOMI, Conjunctiva normal, No discharge.   Neck: Normal range of motion, No tenderness, Supple, No stridor.   Lymphatic: No lymphadenopathy noted.   Cardiovascular: Normal heart rate, Normal rhythm, No murmurs, No rubs, No gallops.   Thorax & Lungs: Normal breath " sounds, No respiratory distress, No wheezing, No chest tenderness.   Abdomen: Bowel sounds normal, Soft, No tenderness, No masses, No pulsatile masses.   Skin: Warm, Dry, No erythema, No rash.   Back: Reproducible paraspinal discomfort in the lumbar region with no midline discomfort or step-offs.   Extremities: Intact distal pulses, No edema, No tenderness, No cyanosis, No clubbing.   Neurologic: Alert & oriented x 3, Normal motor function, Normal sensory function, No focal deficits noted.   Psychiatric: Affect normal, Judgment normal, Mood normal.     COURSE & MEDICAL DECISION MAKING  Pertinent Labs & Imaging studies reviewed. (See chart for details)  This a 45-year-old female who presents with low back discomfort.  Clinically her pain is reproducible consistent with a muscular injury.  However based on her history she has some radicular symptoms that would support a herniated disc.  Therefore this could be a combination of a herniated disc as well as some muscular spasm.  The patient received dose of Percocet and prednisone prior to discharge.  Should be discharged on 5 more days of prednisone with instructions take Flexeril as needed.  She will return if she is acutely worse or develops weakness to her lower extremities.  She does not have any dysuria nor infectious symptoms.    FINAL IMPRESSION  1.  Low back pain    Disposition  The patient will be discharged in stable condition.         Electronically signed by: Michael Yin M.D., 12/3/2020 9:23 PM

## 2020-12-04 NOTE — ED TRIAGE NOTES
Charley Dunn  45 y.o.  Chief Complaint   Patient presents with   • Low Back Pain     since this am; denies trauma or injury; pt denies numbness, tingling, or radiating pain; the pain is described as constant; pt denies loss of bowel or bladder control

## 2020-12-05 VITALS — SYSTOLIC BLOOD PRESSURE: 98 MMHG | DIASTOLIC BLOOD PRESSURE: 59 MMHG

## 2020-12-05 VITALS — SYSTOLIC BLOOD PRESSURE: 122 MMHG | DIASTOLIC BLOOD PRESSURE: 78 MMHG

## 2020-12-05 VITALS — DIASTOLIC BLOOD PRESSURE: 74 MMHG | SYSTOLIC BLOOD PRESSURE: 109 MMHG

## 2020-12-05 VITALS — DIASTOLIC BLOOD PRESSURE: 60 MMHG | SYSTOLIC BLOOD PRESSURE: 98 MMHG

## 2020-12-05 LAB
BASOPHILS # BLD AUTO: 0 X10^3/UL (ref 0–0.1)
BASOPHILS NFR BLD AUTO: 0 % (ref 0–1)
EOSINOPHIL # BLD AUTO: 0.1 X10^3/UL (ref 0–0.4)
EOSINOPHIL NFR BLD AUTO: 1 % (ref 1–7)
ERYTHROCYTE [DISTWIDTH] IN BLOOD BY AUTOMATED COUNT: 15.5 % (ref 9.6–15.2)
ERYTHROCYTE [SEDIMENTATION RATE] IN BLOOD BY WESTERGREN METHOD: 39 MM/HR (ref 0–20)
HCT (SEDRATE): 35.2 % (ref 34.6–47.8)
LYMPHOCYTES # BLD AUTO: 4.5 X10^3/UL (ref 1–3.4)
LYMPHOCYTES NFR BLD AUTO: 40 % (ref 22–44)
MCH RBC QN AUTO: 26.9 PG (ref 27–34.8)
MCHC RBC AUTO-ENTMCNC: 33 G/DL (ref 32.4–35.8)
MD: NO
MONOCYTES # BLD AUTO: 0.7 X10^3/UL (ref 0.2–0.8)
MONOCYTES NFR BLD AUTO: 6 % (ref 2–9)
NEUTROPHILS # BLD AUTO: 5.9 X10^3/UL (ref 1.8–6.8)
NEUTROPHILS NFR BLD AUTO: 53 % (ref 42–75)
PLATELET # BLD AUTO: 316 X10^3/UL (ref 130–400)
PMV BLD AUTO: 7.6 FL (ref 7.4–10.4)
RBC # BLD AUTO: 4.44 X10^6/UL (ref 3.82–5.3)

## 2020-12-05 RX ADMIN — HYDROCODONE BITARTRATE AND ACETAMINOPHEN PRN TAB: 5; 325 TABLET ORAL at 06:27

## 2020-12-05 RX ADMIN — QUETIAPINE FUMARATE SCH MG: 25 TABLET ORAL at 20:28

## 2020-12-05 RX ADMIN — HYDROCODONE BITARTRATE AND ACETAMINOPHEN PRN TAB: 5; 325 TABLET ORAL at 09:00

## 2020-12-05 RX ADMIN — HYDROCODONE BITARTRATE AND ACETAMINOPHEN PRN TAB: 5; 325 TABLET ORAL at 13:03

## 2020-12-05 RX ADMIN — VANCOMYCIN HYDROCHLORIDE SCH MLS/HR: 1 INJECTION, POWDER, LYOPHILIZED, FOR SOLUTION INTRAVENOUS at 23:42

## 2020-12-05 RX ADMIN — CEFEPIME SCH MLS/HR: 2 INJECTION, POWDER, FOR SOLUTION INTRAVENOUS at 20:29

## 2020-12-05 RX ADMIN — VANCOMYCIN HYDROCHLORIDE SCH MLS/HR: 1 INJECTION, POWDER, LYOPHILIZED, FOR SOLUTION INTRAVENOUS at 10:09

## 2020-12-05 RX ADMIN — METRONIDAZOLE SCH MLS/HR: 500 INJECTION, SOLUTION INTRAVENOUS at 21:30

## 2020-12-05 RX ADMIN — HYDROCODONE BITARTRATE AND ACETAMINOPHEN PRN TAB: 5; 325 TABLET ORAL at 18:34

## 2020-12-05 RX ADMIN — METRONIDAZOLE SCH MLS/HR: 500 INJECTION, SOLUTION INTRAVENOUS at 13:04

## 2020-12-05 RX ADMIN — CEFEPIME SCH MLS/HR: 2 INJECTION, POWDER, FOR SOLUTION INTRAVENOUS at 09:00

## 2020-12-05 RX ADMIN — METRONIDAZOLE SCH MLS/HR: 500 INJECTION, SOLUTION INTRAVENOUS at 06:23

## 2020-12-05 NOTE — NUR
PT HERE FOR REDNESS AND SWELLING TO EAR AND CHEEK ON THR RIGHT WITH NAUSEA AND 
HA. PT HAS HAD SIMILAR SYMPTOMS IN PAST. PT PLACED ON O2 MONITORING AND BP. 
VSS. SPOUSE AT BEDSIDE. PT IN NAD. CALL LIGHT IN REACH

## 2020-12-05 NOTE — NUR
PT MEDICATED FOR PAIN. WAITING FOR LAB TO DRAW BLOOD CULTURES. WILL START ABX 
WHEN DRAWN. MED REC COMPLETE. CALL LIGHT IN REACH

## 2020-12-06 VITALS — DIASTOLIC BLOOD PRESSURE: 84 MMHG | SYSTOLIC BLOOD PRESSURE: 126 MMHG

## 2020-12-06 VITALS — SYSTOLIC BLOOD PRESSURE: 117 MMHG | DIASTOLIC BLOOD PRESSURE: 60 MMHG

## 2020-12-06 VITALS — DIASTOLIC BLOOD PRESSURE: 76 MMHG | SYSTOLIC BLOOD PRESSURE: 124 MMHG

## 2020-12-06 VITALS — SYSTOLIC BLOOD PRESSURE: 97 MMHG | DIASTOLIC BLOOD PRESSURE: 60 MMHG

## 2020-12-06 LAB
ALBUMIN SERPL-MCNC: 2.7 G/DL (ref 3.4–5)
ALP SERPL-CCNC: 96 U/L (ref 45–117)
ALT SERPL-CCNC: 14 U/L (ref 12–78)
ANION GAP SERPL CALC-SCNC: 4 MMOL/L (ref 5–15)
BASOPHILS # BLD AUTO: 0 X10^3/UL (ref 0–0.1)
BASOPHILS NFR BLD AUTO: 1 % (ref 0–1)
BILIRUB SERPL-MCNC: 0.3 MG/DL (ref 0.2–1)
CALCIUM SERPL-MCNC: 8.4 MG/DL (ref 8.5–10.1)
CHLORIDE SERPL-SCNC: 115 MMOL/L (ref 98–107)
CREAT SERPL-MCNC: 0.86 MG/DL (ref 0.55–1.02)
EOSINOPHIL # BLD AUTO: 0.3 X10^3/UL (ref 0–0.4)
EOSINOPHIL NFR BLD AUTO: 4 % (ref 1–7)
ERYTHROCYTE [DISTWIDTH] IN BLOOD BY AUTOMATED COUNT: 15.8 % (ref 9.6–15.2)
LYMPHOCYTES # BLD AUTO: 2.7 X10^3/UL (ref 1–3.4)
LYMPHOCYTES NFR BLD AUTO: 39 % (ref 22–44)
MCH RBC QN AUTO: 28 PG (ref 27–34.8)
MCHC RBC AUTO-ENTMCNC: 33.5 G/DL (ref 32.4–35.8)
MD: NO
MONOCYTES # BLD AUTO: 0.4 X10^3/UL (ref 0.2–0.8)
MONOCYTES NFR BLD AUTO: 6 % (ref 2–9)
NEUTROPHILS # BLD AUTO: 3.4 X10^3/UL (ref 1.8–6.8)
NEUTROPHILS NFR BLD AUTO: 50 % (ref 42–75)
PLATELET # BLD AUTO: 252 X10^3/UL (ref 130–400)
PMV BLD AUTO: 7.2 FL (ref 7.4–10.4)
PROT SERPL-MCNC: 6.3 G/DL (ref 6.4–8.2)
RBC # BLD AUTO: 4.08 X10^6/UL (ref 3.82–5.3)

## 2020-12-06 RX ADMIN — METRONIDAZOLE SCH MLS/HR: 500 INJECTION, SOLUTION INTRAVENOUS at 12:40

## 2020-12-06 RX ADMIN — CEFEPIME SCH MLS/HR: 2 INJECTION, POWDER, FOR SOLUTION INTRAVENOUS at 09:09

## 2020-12-06 RX ADMIN — HYDROCODONE BITARTRATE AND ACETAMINOPHEN PRN TAB: 5; 325 TABLET ORAL at 12:45

## 2020-12-06 RX ADMIN — QUETIAPINE FUMARATE SCH MG: 25 TABLET ORAL at 20:31

## 2020-12-06 RX ADMIN — VANCOMYCIN HYDROCHLORIDE SCH MLS/HR: 1 INJECTION, POWDER, LYOPHILIZED, FOR SOLUTION INTRAVENOUS at 23:11

## 2020-12-06 RX ADMIN — VANCOMYCIN HYDROCHLORIDE SCH MLS/HR: 1 INJECTION, POWDER, LYOPHILIZED, FOR SOLUTION INTRAVENOUS at 11:34

## 2020-12-06 RX ADMIN — METRONIDAZOLE SCH MLS/HR: 500 INJECTION, SOLUTION INTRAVENOUS at 05:09

## 2020-12-06 RX ADMIN — METRONIDAZOLE SCH MLS/HR: 500 INJECTION, SOLUTION INTRAVENOUS at 21:54

## 2020-12-06 RX ADMIN — HYDROCODONE BITARTRATE AND ACETAMINOPHEN PRN TAB: 5; 325 TABLET ORAL at 05:10

## 2020-12-06 RX ADMIN — HYDROCODONE BITARTRATE AND ACETAMINOPHEN PRN TAB: 5; 325 TABLET ORAL at 20:31

## 2020-12-06 RX ADMIN — CEFEPIME SCH MLS/HR: 2 INJECTION, POWDER, FOR SOLUTION INTRAVENOUS at 20:31

## 2020-12-07 VITALS — SYSTOLIC BLOOD PRESSURE: 104 MMHG | DIASTOLIC BLOOD PRESSURE: 67 MMHG

## 2020-12-07 VITALS — SYSTOLIC BLOOD PRESSURE: 112 MMHG | DIASTOLIC BLOOD PRESSURE: 75 MMHG

## 2020-12-07 VITALS — SYSTOLIC BLOOD PRESSURE: 104 MMHG | DIASTOLIC BLOOD PRESSURE: 66 MMHG

## 2020-12-07 LAB
ANION GAP SERPL CALC-SCNC: 5 MMOL/L (ref 5–15)
BASOPHILS # BLD AUTO: 0 X10^3/UL (ref 0–0.1)
BASOPHILS NFR BLD AUTO: 1 % (ref 0–1)
CALCIUM SERPL-MCNC: 8.8 MG/DL (ref 8.5–10.1)
CHLORIDE SERPL-SCNC: 108 MMOL/L (ref 98–107)
CREAT SERPL-MCNC: 0.85 MG/DL (ref 0.55–1.02)
EOSINOPHIL # BLD AUTO: 0.3 X10^3/UL (ref 0–0.4)
EOSINOPHIL NFR BLD AUTO: 3 % (ref 1–7)
ERYTHROCYTE [DISTWIDTH] IN BLOOD BY AUTOMATED COUNT: 15.6 % (ref 9.6–15.2)
LYMPHOCYTES # BLD AUTO: 0.9 X10^3/UL (ref 1–3.4)
LYMPHOCYTES NFR BLD AUTO: 11 % (ref 22–44)
MCH RBC QN AUTO: 27.9 PG (ref 27–34.8)
MCHC RBC AUTO-ENTMCNC: 34.2 G/DL (ref 32.4–35.8)
MD: NO
MONOCYTES # BLD AUTO: 0.6 X10^3/UL (ref 0.2–0.8)
MONOCYTES NFR BLD AUTO: 7 % (ref 2–9)
NEUTROPHILS # BLD AUTO: 6.4 X10^3/UL (ref 1.8–6.8)
NEUTROPHILS NFR BLD AUTO: 78 % (ref 42–75)
PLATELET # BLD AUTO: 235 X10^3/UL (ref 130–400)
PMV BLD AUTO: 7.5 FL (ref 7.4–10.4)
RBC # BLD AUTO: 4.38 X10^6/UL (ref 3.82–5.3)

## 2020-12-07 RX ADMIN — VANCOMYCIN HYDROCHLORIDE SCH MLS/HR: 1 INJECTION, POWDER, LYOPHILIZED, FOR SOLUTION INTRAVENOUS at 10:03

## 2020-12-07 RX ADMIN — METRONIDAZOLE SCH MLS/HR: 500 INJECTION, SOLUTION INTRAVENOUS at 05:07

## 2020-12-07 RX ADMIN — METRONIDAZOLE SCH MLS/HR: 500 INJECTION, SOLUTION INTRAVENOUS at 13:52

## 2020-12-07 RX ADMIN — CEFEPIME SCH MLS/HR: 2 INJECTION, POWDER, FOR SOLUTION INTRAVENOUS at 08:40

## 2020-12-27 ENCOUNTER — HOSPITAL ENCOUNTER (EMERGENCY)
Dept: HOSPITAL 8 - ED | Age: 45
Discharge: HOME | End: 2020-12-27
Payer: COMMERCIAL

## 2020-12-27 VITALS — HEIGHT: 68 IN | WEIGHT: 157.41 LBS | BODY MASS INDEX: 23.86 KG/M2

## 2020-12-27 VITALS — DIASTOLIC BLOOD PRESSURE: 80 MMHG | SYSTOLIC BLOOD PRESSURE: 115 MMHG

## 2020-12-27 DIAGNOSIS — K50.90: ICD-10-CM

## 2020-12-27 DIAGNOSIS — Z85.3: ICD-10-CM

## 2020-12-27 DIAGNOSIS — H60.11: Primary | ICD-10-CM

## 2020-12-27 PROCEDURE — 99283 EMERGENCY DEPT VISIT LOW MDM: CPT

## 2020-12-27 NOTE — NUR
CC OF RIGHT EAR PAIN SINCE YESTERDAY 9/10 PAIN WITH FEELING OFF BALANCE. FRIEND 
AT BEDSIDE. PT REQUESTING PAIN MEDICATIONS.

## 2020-12-28 ENCOUNTER — HOSPITAL ENCOUNTER (EMERGENCY)
Dept: HOSPITAL 8 - ED | Age: 45
Discharge: HOME | End: 2020-12-28
Payer: COMMERCIAL

## 2020-12-28 VITALS — BODY MASS INDEX: 23.79 KG/M2 | HEIGHT: 68 IN | WEIGHT: 156.97 LBS

## 2020-12-28 VITALS — DIASTOLIC BLOOD PRESSURE: 80 MMHG | SYSTOLIC BLOOD PRESSURE: 129 MMHG

## 2020-12-28 DIAGNOSIS — R51.9: ICD-10-CM

## 2020-12-28 DIAGNOSIS — H92.01: ICD-10-CM

## 2020-12-28 DIAGNOSIS — L03.221: Primary | ICD-10-CM

## 2020-12-28 LAB
ALBUMIN SERPL-MCNC: 3.1 G/DL (ref 3.4–5)
ANION GAP SERPL CALC-SCNC: 4 MMOL/L (ref 5–15)
BASOPHILS # BLD AUTO: 0.1 X10^3/UL (ref 0–0.1)
BASOPHILS NFR BLD AUTO: 1 % (ref 0–1)
CALCIUM SERPL-MCNC: 9.1 MG/DL (ref 8.5–10.1)
CHLORIDE SERPL-SCNC: 110 MMOL/L (ref 98–107)
CREAT SERPL-MCNC: 0.8 MG/DL (ref 0.55–1.02)
EOSINOPHIL # BLD AUTO: 0.3 X10^3/UL (ref 0–0.4)
EOSINOPHIL NFR BLD AUTO: 4 % (ref 1–7)
ERYTHROCYTE [DISTWIDTH] IN BLOOD BY AUTOMATED COUNT: 15.5 % (ref 9.6–15.2)
LYMPHOCYTES # BLD AUTO: 2.5 X10^3/UL (ref 1–3.4)
LYMPHOCYTES NFR BLD AUTO: 28 % (ref 22–44)
MCH RBC QN AUTO: 27.9 PG (ref 27–34.8)
MCHC RBC AUTO-ENTMCNC: 33.8 G/DL (ref 32.4–35.8)
MD: NO
MONOCYTES # BLD AUTO: 0.5 X10^3/UL (ref 0.2–0.8)
MONOCYTES NFR BLD AUTO: 6 % (ref 2–9)
NEUTROPHILS # BLD AUTO: 5.5 X10^3/UL (ref 1.8–6.8)
NEUTROPHILS NFR BLD AUTO: 62 % (ref 42–75)
PLATELET # BLD AUTO: 338 X10^3/UL (ref 130–400)
PMV BLD AUTO: 7.2 FL (ref 7.4–10.4)
RBC # BLD AUTO: 4.61 X10^6/UL (ref 3.82–5.3)

## 2020-12-28 PROCEDURE — 99284 EMERGENCY DEPT VISIT MOD MDM: CPT

## 2020-12-28 PROCEDURE — 36415 COLL VENOUS BLD VENIPUNCTURE: CPT

## 2020-12-28 PROCEDURE — 96365 THER/PROPH/DIAG IV INF INIT: CPT

## 2020-12-28 PROCEDURE — 80048 BASIC METABOLIC PNL TOTAL CA: CPT

## 2020-12-28 PROCEDURE — 96366 THER/PROPH/DIAG IV INF ADDON: CPT

## 2020-12-28 PROCEDURE — 85025 COMPLETE CBC W/AUTO DIFF WBC: CPT

## 2020-12-28 PROCEDURE — 82040 ASSAY OF SERUM ALBUMIN: CPT

## 2020-12-28 NOTE — NUR
PT AMBULATES WELL TO BATHROOM. DRESSING AT THIS TIME. REPORTS DECREASE IN PAIN 
LEVEL FOLLOWING FINAL MED.

## 2020-12-28 NOTE — NUR
PT MEDICATED PER EMAR, IV START, ON BP AND SPO2 MONITORING. LIGHTS DIMMED PER 
PT COMFORT. PT  AT BEDSIDE. NAD NOTED AT THIS TIME.

## 2020-12-28 NOTE — NUR
PT SITTING UP IN BED, CELL PHONE IN USE. NAD NOTED AT THIS TIME. PT REPORTS 
MEDICATION HAS "BARELY" HELPED HER PAIN. RESPIRATIONS EVEN AND UNLABORED ON RA. 
 AT BEDSIDE.

## 2021-01-03 ENCOUNTER — HOSPITAL ENCOUNTER (EMERGENCY)
Dept: HOSPITAL 8 - ED | Age: 46
Discharge: HOME | End: 2021-01-03
Payer: COMMERCIAL

## 2021-01-03 VITALS — SYSTOLIC BLOOD PRESSURE: 129 MMHG | DIASTOLIC BLOOD PRESSURE: 78 MMHG

## 2021-01-03 VITALS — BODY MASS INDEX: 23.69 KG/M2 | WEIGHT: 156.31 LBS | HEIGHT: 68 IN

## 2021-01-03 DIAGNOSIS — Z88.2: ICD-10-CM

## 2021-01-03 DIAGNOSIS — Z88.6: ICD-10-CM

## 2021-01-03 DIAGNOSIS — M06.9: ICD-10-CM

## 2021-01-03 DIAGNOSIS — H70.11: Primary | ICD-10-CM

## 2021-01-03 DIAGNOSIS — Z90.49: ICD-10-CM

## 2021-01-03 DIAGNOSIS — Z88.0: ICD-10-CM

## 2021-01-03 DIAGNOSIS — H60.11: ICD-10-CM

## 2021-01-03 PROCEDURE — 99283 EMERGENCY DEPT VISIT LOW MDM: CPT

## 2021-01-03 NOTE — NUR
STEVE QUINN AT BEDSIDE FOR RECHECK, PT TO BE DISCHARGED, REPORTS PAIN IMPROVED. CALL 
LIGHT IN REACH. AWAITING CHART AND DISCHARGE PAPERS

## 2021-01-03 NOTE — NUR
DR. MARTINEZ AT BEDSIDE FOR EVALUATION. PT MEDICATED AS NOTED PER MD ORDER FOR 
9/10 RIGHT EAR PAIN. ENT PAGED BY DR. MARTINEZ. CALL LIGHT IN REACH. FALL 
PRECAUTIONS IN PLACE

## 2021-01-03 NOTE — NUR
ASSUMED CARE OF PT AT THIS TIME FROM TRIAGE. 1ST CONTACT WITH PT. TSEVE QUINN AT 
BEDSIDE FOR EVALUATION. 46 Y/O F PRESENTS STATING "MY RIGHT EAR HAS BEEN 
HURTING ON AND OFF SINCE FRIDAY, I WAS HERE THEN AND THEY GAVE ME A 
PRESCRIPTION FOR ANTIOBIOTICS AND PAIN MEDICATION BUT I COULDN'T AFFORD THEM SO 
I NEVER GOT THEM FILLED, MY EAR HAS SOME STUFF COMING OUT LIKE BROWN AND PAIN 
IS GETTING WORSE." RATES PAIN 9/10. VSS. FAMILY AT BEDSIDE. A&OX4. AWAITING 
ORDERS. CALL LIGHT IN REACH. FALL PRECAUTIONS IN PLACE.

## 2021-01-03 NOTE — NUR
BREAK RN. PT STATES PAIN DECREASED, MEDICATION EFFECTIVE. PT OK FOR D/C PER 
ERMD. PT VERBALIZED UNDERSTANDING OF D/C INSTRUCTIONS. PT HAS STEADY GAIT UPON 
D/C AND ALL OWN BELONGINGS.

## 2021-01-29 ENCOUNTER — HOSPITAL ENCOUNTER (EMERGENCY)
Dept: HOSPITAL 8 - ED | Age: 46
Discharge: HOME | End: 2021-01-29
Payer: COMMERCIAL

## 2021-01-29 VITALS — BODY MASS INDEX: 23.96 KG/M2 | HEIGHT: 68 IN | WEIGHT: 158.07 LBS

## 2021-01-29 VITALS — DIASTOLIC BLOOD PRESSURE: 90 MMHG | SYSTOLIC BLOOD PRESSURE: 143 MMHG

## 2021-01-29 DIAGNOSIS — H60.11: ICD-10-CM

## 2021-01-29 DIAGNOSIS — H60.311: ICD-10-CM

## 2021-01-29 DIAGNOSIS — H66.005: Primary | ICD-10-CM

## 2021-01-29 PROCEDURE — 99283 EMERGENCY DEPT VISIT LOW MDM: CPT

## 2021-02-04 ENCOUNTER — HOSPITAL ENCOUNTER (EMERGENCY)
Facility: MEDICAL CENTER | Age: 46
End: 2021-02-04
Attending: EMERGENCY MEDICINE

## 2021-02-04 ENCOUNTER — APPOINTMENT (OUTPATIENT)
Dept: RADIOLOGY | Facility: MEDICAL CENTER | Age: 46
End: 2021-02-04
Attending: EMERGENCY MEDICINE

## 2021-02-04 VITALS
HEIGHT: 68 IN | TEMPERATURE: 98.4 F | SYSTOLIC BLOOD PRESSURE: 136 MMHG | RESPIRATION RATE: 17 BRPM | HEART RATE: 98 BPM | DIASTOLIC BLOOD PRESSURE: 88 MMHG | OXYGEN SATURATION: 99 % | BODY MASS INDEX: 23.76 KG/M2 | WEIGHT: 156.75 LBS

## 2021-02-04 DIAGNOSIS — S80.11XA CONTUSION OF MULTIPLE SITES OF RIGHT LOWER EXTREMITY, INITIAL ENCOUNTER: ICD-10-CM

## 2021-02-04 PROCEDURE — 93971 EXTREMITY STUDY: CPT | Mod: RT

## 2021-02-04 PROCEDURE — 99283 EMERGENCY DEPT VISIT LOW MDM: CPT

## 2021-02-04 ASSESSMENT — LIFESTYLE VARIABLES
DO YOU DRINK ALCOHOL: YES
HAVE YOU EVER FELT YOU SHOULD CUT DOWN ON YOUR DRINKING: NO

## 2021-02-04 ASSESSMENT — FIBROSIS 4 INDEX: FIB4 SCORE: 0.77

## 2021-02-04 NOTE — ED TRIAGE NOTES
Chief Complaint   Patient presents with   • Leg Swelling     Pt just noticed a bruise to her right lower leg this am, recent hospitalization for an ear infection. Concerned it may be a DVT, no trauma.

## 2021-02-04 NOTE — ED PROVIDER NOTES
ED Provider Note  CHIEF COMPLAINT  Chief Complaint   Patient presents with   • Leg Swelling     Pt just noticed a bruise to her right lower leg this am, recent hospitalization for an ear infection. Concerned it may be a DVT, no trauma.       HPI  Charley Dunn is a 45 y.o. female who presents to the ER with complaint of a bruise to her right lower extremity which she noticed this morning.  She also reports some mild swelling to her right lower extremity which she also noticed this morning.  The patient spent the weekend at [facility].  She was hospitalized for an ear infection.  She was discharged 3 days ago.  She states that because of Covid she has been self quarantining at home.  She is worried she is going to get Covid so she has been social distancing per CDC recommendations.  She says as a result she really has not been walking around the house much.  She was getting Lovenox when she was in the hospital.  She is concerned she might have a blood clot because of her recent hospitalization.  She does not recall bumping her leg against anything.  No known trauma or injury to her leg.  No chest pains or shortness of breath.  No numbness or tingling into her feet.  She has never had any history of DVT or PE.    REVIEW OF SYSTEMS  See HPI for further details.  Positive for bruise to the right lower extremity and swelling to the right lower extremity.  Negative for chest pain, shortness of breath, numbness/tingling.  All other systems are negative.    PAST MEDICAL HISTORY  Past Medical History:   Diagnosis Date   • Anxiety    • Bradycardia    • Crohn's disease (HCC)    • MRSA (methicillin resistant Staphylococcus aureus)     to R face   • RA (rheumatoid arthritis) ([facility])        FAMILY HISTORY  No family history on file.    SOCIAL HISTORY  Social History     Socioeconomic History   • Marital status:      Spouse name: Not on file   • Number of children: Not on file   • Years of education: Not on  file   • Highest education level: Not on file   Occupational History   • Not on file   Social Needs   • Financial resource strain: Not very hard   • Food insecurity     Worry: Never true     Inability: Never true   • Transportation needs     Medical: No     Non-medical: No   Tobacco Use   • Smoking status: Current Every Day Smoker     Packs/day: 0.50     Years: 32.00     Pack years: 16.00     Types: Cigarettes   • Smokeless tobacco: Never Used   Substance and Sexual Activity   • Alcohol use: Not Currently     Comment: rare   • Drug use: Never   • Sexual activity: Not on file   Lifestyle   • Physical activity     Days per week: Not on file     Minutes per session: Not on file   • Stress: Not on file   Relationships   • Social connections     Talks on phone: Not on file     Gets together: Not on file     Attends Druze service: Not on file     Active member of club or organization: Not on file     Attends meetings of clubs or organizations: Not on file     Relationship status: Not on file   • Intimate partner violence     Fear of current or ex partner: Not on file     Emotionally abused: Not on file     Physically abused: Not on file     Forced sexual activity: Not on file   Other Topics Concern   • Not on file   Social History Narrative   • Not on file       SURGICAL HISTORY  Past Surgical History:   Procedure Laterality Date   • OTHER     • OTHER ABDOMINAL SURGERY         CURRENT MEDICATIONS  Home Medications    **Home medications have not yet been reviewed for this encounter**         ALLERGIES  Allergies   Allergen Reactions   • Pcn [Penicillins] Anaphylaxis     Tolerates Keflex 10/2020   • Toradol Hives     Migraine     • Tramadol Hives     migraine     • Bactrim [Sulfamethoxazole W-Trimethoprim] Itching     Face itching     • Stadol [Butorphanol] Unspecified     Pt reports it makes her feel like she can't wake up   • Blackberry [Rubus Fruticosus]      Oral swelling   • Fish Swelling   • Levaquin      Vomiting  "   • Tree Nuts Food Allergy        PHYSICAL EXAM  VITAL SIGNS: /88   Pulse 98   Temp 36.9 °C (98.4 °F) (Temporal)   Resp 17   Ht 1.727 m (5' 8\")   Wt 71.1 kg (156 lb 12 oz)   SpO2 99%   BMI 23.83 kg/m²      Constitutional: Well developed, well nourished; No acute distress; Non-toxic appearance.   HENT: Normocephalic, atraumatic; Bilateral external ears normal; oropharyngeal examination deferred due to Covid 19 outbreak and lack of oropharyngeal complaint  Eyes: PERRL, EOMI, Conjunctiva normal. No discharge.   Neck:  Supple,   Thorax & Lungs: No respiratory distress  Abdomen: Soft, nontender, bowel sounds normal. No obvious masses; No pulsatile masses; no rebound, guarding, or peritoneal signs.   Skin: Good color; warm and dry without rash or petechia.  Back: Nontender, No CVA tenderness.   Extremities: Distal radial, dorsalis pedis, posterior tibial pulses are equal bilaterally; No edema; Nontender calves or saphenous, No cyanosis, No clubbing.   Musculoskeletal: Right lower extremity: There is a small area of ecchymosis to the anteromedial aspect of the proximal tibia with some mild swelling and tenderness within that area.  There is some mild tenderness to the calf.  No tenderness to the medial thigh.  Nontender knee.  No swelling to the knee.  Nontender ankle.  No swelling to the ankle.  There is no erythema.  No warmth.  Compartments of the calf are soft.  No pain in the leg with passive extension of the toes.  Full range of motion to the digits.  Sensation is intact to light touch.  2+ DP and PT pulses equal bilaterally.  Neurologic: Alert & oriented x 4, clear speech      RADIOLOGY/PROCEDURES  US-EXTREMITY VENOUS LOWER UNILAT RIGHT   Final Result      CONCLUSIONS   No evidence of deep venous thrombosis.    COURSE & MEDICAL DECISION MAKING  Pertinent Labs & Imaging studies reviewed. (See chart for details)      Patient presents to the ER complaining of a bruise to her right lower extremity as well " as some mild swelling of the right lower extremity and some calf pain.  The patient noticed the symptoms this morning.  She spent the weekend at [facility], admitted for right ear infection.  She was discharged 3 days ago.  She is concerned about DVT.  She was getting Lovenox injections while she was in the hospital.  She does not recall any trauma or injury to her leg.  However, she noticed a bruise today when she woke up this morning.  She does have a bruise on the anteromedial aspect of the proximal tibial region.  It is tender to palpation.  There is some very subtle swelling in the area of the bruise and slightly beyond it.  There was some tenderness in the calf.  The compartments of the legs are soft.  No concern for compartment syndrome at this time.  She has 2+ pedal pulses.  No concern for arterial compromise.  Again, no trauma or injury.  No think an x-ray is necessary.  She was concerned about DVT given her recent hospitalization.  Ultrasounds performed and is negative for DVT.  She has no chest pains or shortness of breath.  Her vitals are normal and stable.  She is not in any distress.  I think she must have bumped her leg.  Perhaps being on Lovenox over the weekend made her blood a little bit thinner and even a minor bump of the anterior tibia caused a bruise and some mild swelling.  The patient is safe and stable for outpatient management and discharge home.  She has been given strict return precautions and discharge instructions and she understands if she has any worsening bruising, swelling or pain or if she has any concerns at all she should return to the ER immediately.  Patient understands treatment plan and follow-up.      I verified that the patient was wearing a mask and I was wearing appropriate PPE every time I entered the room. The patient's mask was on the patient at all times during my encounter     FINAL IMPRESSION  1. Contusion of multiple sites of right lower extremity,  initial encounter Acute         This dictation has been created using voice recognition software. The accuracy of the dictation is limited by the abilities of the software. I expect there may be some errors of grammar and possibly content. I made every attempt to manually correct the errors within my dictation. However, errors related to voice recognition software may still exist and should be interpreted within the appropriate context.    Electronically signed by: Ashli Chen M.D., 2/4/2021 2:51 PM

## 2021-02-05 NOTE — DISCHARGE INSTRUCTIONS
Use ice to help with the swelling and the pain.  Elevate leg is much as possible.  This will help with the swelling and the pain.    Return to the ER for any worsening swelling, worsening pain, worsening bruising, redness to the leg, numbness or tingling to your toes, pain out of proportion to what you would expect, fevers over 100.4, shaking chills, chest pain, shortness of breath, or for any concerns.    Follow-up with your primary care physician within the next 1 to 2 days.  If you do not have a primary care physician, follow-up with one of the primary care doctors at the [facility].  Please call for appointment.

## 2021-04-06 ENCOUNTER — HOSPITAL ENCOUNTER (EMERGENCY)
Facility: MEDICAL CENTER | Age: 46
End: 2021-04-06
Attending: EMERGENCY MEDICINE

## 2021-04-06 ENCOUNTER — APPOINTMENT (OUTPATIENT)
Dept: RADIOLOGY | Facility: MEDICAL CENTER | Age: 46
End: 2021-04-06
Attending: EMERGENCY MEDICINE

## 2021-04-06 VITALS
OXYGEN SATURATION: 96 % | WEIGHT: 159.39 LBS | HEART RATE: 97 BPM | HEIGHT: 68 IN | TEMPERATURE: 98.1 F | BODY MASS INDEX: 24.16 KG/M2 | DIASTOLIC BLOOD PRESSURE: 77 MMHG | RESPIRATION RATE: 33 BRPM | SYSTOLIC BLOOD PRESSURE: 118 MMHG

## 2021-04-06 DIAGNOSIS — H60.332 ACUTE SWIMMER'S EAR OF LEFT SIDE: ICD-10-CM

## 2021-04-06 DIAGNOSIS — L03.221 CELLULITIS OF NECK: ICD-10-CM

## 2021-04-06 DIAGNOSIS — R65.10 SIRS (SYSTEMIC INFLAMMATORY RESPONSE SYNDROME) (HCC): ICD-10-CM

## 2021-04-06 LAB
ALBUMIN SERPL BCP-MCNC: 4.3 G/DL (ref 3.2–4.9)
ALBUMIN/GLOB SERPL: 1.1 G/DL
ALP SERPL-CCNC: 155 U/L (ref 30–99)
ALT SERPL-CCNC: 22 U/L (ref 2–50)
ANION GAP SERPL CALC-SCNC: 8 MMOL/L (ref 7–16)
AST SERPL-CCNC: 21 U/L (ref 12–45)
BASOPHILS # BLD AUTO: 0.5 % (ref 0–1.8)
BASOPHILS # BLD: 0.05 K/UL (ref 0–0.12)
BILIRUB SERPL-MCNC: 0.3 MG/DL (ref 0.1–1.5)
BUN SERPL-MCNC: 9 MG/DL (ref 8–22)
CALCIUM SERPL-MCNC: 9.9 MG/DL (ref 8.5–10.5)
CHLORIDE SERPL-SCNC: 102 MMOL/L (ref 96–112)
CO2 SERPL-SCNC: 26 MMOL/L (ref 20–33)
CREAT SERPL-MCNC: 0.73 MG/DL (ref 0.5–1.4)
EOSINOPHIL # BLD AUTO: 0.26 K/UL (ref 0–0.51)
EOSINOPHIL NFR BLD: 2.5 % (ref 0–6.9)
ERYTHROCYTE [DISTWIDTH] IN BLOOD BY AUTOMATED COUNT: 45.9 FL (ref 35.9–50)
GLOBULIN SER CALC-MCNC: 3.9 G/DL (ref 1.9–3.5)
GLUCOSE SERPL-MCNC: 108 MG/DL (ref 65–99)
HCT VFR BLD AUTO: 44.8 % (ref 37–47)
HGB BLD-MCNC: 14.7 G/DL (ref 12–16)
IMM GRANULOCYTES # BLD AUTO: 0.03 K/UL (ref 0–0.11)
IMM GRANULOCYTES NFR BLD AUTO: 0.3 % (ref 0–0.9)
LACTATE BLD-SCNC: 1.7 MMOL/L (ref 0.5–2)
LYMPHOCYTES # BLD AUTO: 3.05 K/UL (ref 1–4.8)
LYMPHOCYTES NFR BLD: 29.5 % (ref 22–41)
MCH RBC QN AUTO: 28.4 PG (ref 27–33)
MCHC RBC AUTO-ENTMCNC: 32.8 G/DL (ref 33.6–35)
MCV RBC AUTO: 86.7 FL (ref 81.4–97.8)
MONOCYTES # BLD AUTO: 0.43 K/UL (ref 0–0.85)
MONOCYTES NFR BLD AUTO: 4.2 % (ref 0–13.4)
NEUTROPHILS # BLD AUTO: 6.53 K/UL (ref 2–7.15)
NEUTROPHILS NFR BLD: 63 % (ref 44–72)
NRBC # BLD AUTO: 0 K/UL
NRBC BLD-RTO: 0 /100 WBC
PLATELET # BLD AUTO: 316 K/UL (ref 164–446)
PMV BLD AUTO: 9.4 FL (ref 9–12.9)
POTASSIUM SERPL-SCNC: 4.2 MMOL/L (ref 3.6–5.5)
PROT SERPL-MCNC: 8.2 G/DL (ref 6–8.2)
RBC # BLD AUTO: 5.17 M/UL (ref 4.2–5.4)
SODIUM SERPL-SCNC: 136 MMOL/L (ref 135–145)
WBC # BLD AUTO: 10.4 K/UL (ref 4.8–10.8)

## 2021-04-06 PROCEDURE — 70487 CT MAXILLOFACIAL W/DYE: CPT

## 2021-04-06 PROCEDURE — 85025 COMPLETE CBC W/AUTO DIFF WBC: CPT

## 2021-04-06 PROCEDURE — 99285 EMERGENCY DEPT VISIT HI MDM: CPT

## 2021-04-06 PROCEDURE — 71045 X-RAY EXAM CHEST 1 VIEW: CPT

## 2021-04-06 PROCEDURE — 83605 ASSAY OF LACTIC ACID: CPT

## 2021-04-06 PROCEDURE — 700117 HCHG RX CONTRAST REV CODE 255: Performed by: EMERGENCY MEDICINE

## 2021-04-06 PROCEDURE — 87040 BLOOD CULTURE FOR BACTERIA: CPT | Mod: 91

## 2021-04-06 PROCEDURE — 96375 TX/PRO/DX INJ NEW DRUG ADDON: CPT

## 2021-04-06 PROCEDURE — 96376 TX/PRO/DX INJ SAME DRUG ADON: CPT

## 2021-04-06 PROCEDURE — 80053 COMPREHEN METABOLIC PANEL: CPT

## 2021-04-06 PROCEDURE — 36415 COLL VENOUS BLD VENIPUNCTURE: CPT

## 2021-04-06 PROCEDURE — 96365 THER/PROPH/DIAG IV INF INIT: CPT

## 2021-04-06 PROCEDURE — 700111 HCHG RX REV CODE 636 W/ 250 OVERRIDE (IP): Performed by: EMERGENCY MEDICINE

## 2021-04-06 RX ORDER — MORPHINE SULFATE 4 MG/ML
4 INJECTION, SOLUTION INTRAMUSCULAR; INTRAVENOUS ONCE
Status: COMPLETED | OUTPATIENT
Start: 2021-04-06 | End: 2021-04-06

## 2021-04-06 RX ORDER — HYDROCODONE BITARTRATE AND ACETAMINOPHEN 5; 325 MG/1; MG/1
1 TABLET ORAL EVERY 4 HOURS PRN
Qty: 25 TABLET | Refills: 0 | Status: SHIPPED | OUTPATIENT
Start: 2021-04-06 | End: 2021-04-13

## 2021-04-06 RX ORDER — ALPRAZOLAM 0.25 MG/1
0.25 TABLET ORAL 2 TIMES DAILY PRN
COMMUNITY

## 2021-04-06 RX ORDER — DOXYCYCLINE 100 MG/1
100 CAPSULE ORAL 2 TIMES DAILY
Qty: 14 CAPSULE | Refills: 0 | Status: ON HOLD | OUTPATIENT
Start: 2021-04-06 | End: 2021-04-09

## 2021-04-06 RX ORDER — ONDANSETRON 2 MG/ML
4 INJECTION INTRAMUSCULAR; INTRAVENOUS ONCE
Status: COMPLETED | OUTPATIENT
Start: 2021-04-06 | End: 2021-04-06

## 2021-04-06 RX ORDER — CIPROFLOXACIN 2 MG/ML
400 INJECTION, SOLUTION INTRAVENOUS ONCE
Status: COMPLETED | OUTPATIENT
Start: 2021-04-06 | End: 2021-04-06

## 2021-04-06 RX ADMIN — MORPHINE SULFATE 4 MG: 4 INJECTION INTRAVENOUS at 17:39

## 2021-04-06 RX ADMIN — ONDANSETRON 4 MG: 2 INJECTION INTRAMUSCULAR; INTRAVENOUS at 16:04

## 2021-04-06 RX ADMIN — CIPROFLOXACIN 400 MG: 2 INJECTION, SOLUTION INTRAVENOUS at 17:42

## 2021-04-06 RX ADMIN — MORPHINE SULFATE 4 MG: 4 INJECTION INTRAVENOUS at 16:06

## 2021-04-06 RX ADMIN — IOHEXOL 80 ML: 350 INJECTION, SOLUTION INTRAVENOUS at 16:37

## 2021-04-06 ASSESSMENT — FIBROSIS 4 INDEX: FIB4 SCORE: 0.77

## 2021-04-06 NOTE — ED TRIAGE NOTES
Charley Dunn  45 y.o.  Chief Complaint   Patient presents with   • Earache     RIGHT, history of recurrent R-sided periauricular cellulitis discharged from [facility] 4/4/2021, describes pain as 10/10 sharp stabbing throbbing   • Dizziness     intermitttent slight dizziness with reucrrent infections, dizziness worse this morning, states that she is unable to ambulate due to the dizziness     Patient to triage via wheelchair for above. A & O x 4, GCS 15. Mask in place.    Patient states that this is a recurrent problem, and that she has had MRSA on her face. She states that she has an appointment with an ENT, but that the pain and dizziness are too bad for her to wait. + concurrent nausea and loss of appetite with symptoms.    Did not have a change to fill her prescriptions after being discharged from [facility] - was going to pick them up from the pharmacy this morning but her symptoms were too severe so she came here instead.    Complaining of nausea in triage, emesis bag provided, no vomiting noted at this time.    Triage process explained to patient, apologized for wait time, and returned to [facility].

## 2021-04-06 NOTE — ED PROVIDER NOTES
ED Provider Note    Scribed for Jose Palomino M.D. by Issa Mariee. 4/6/2021  3:41 PM    Primary care provider: Pcp Pt States None  Means of arrival: Walk-in  History obtained from: Patient  History limited by: None    CHIEF COMPLAINT  Chief Complaint   Patient presents with   • Earache     RIGHT, history of recurrent R-sided periauricular cellulitis discharged from [facility] 4/4/2021, describes pain as 10/10 sharp stabbing throbbing   • Dizziness     intermitttent slight dizziness with reucrrent infections, dizziness worse this morning, states that she is unable to ambulate due to the dizziness       HPI  Charley Dunn is a 45 y.o. female who presents to the Emergency Department for acute, moderate right sided earache. Patient has a history of recurrent right sided periauricular cellulitis with similar episodes of pain in the past. She also reports having intermittent dizziness (described as vertigo) which she states is new, as well as mild left ear pain and nausea. There are no known alleviating or exacerbating factors. Patient rates her pain at a 10/10 in severity. She was admitted on 4/4/21 for her periauricular cellulitis. Patient denies any associated vomiting, abdominal pain, chest pain, cough, or fever.     REVIEW OF SYSTEMS  Pertinent negatives include no vomiting, chest pain, cough, or fever.  As above, all other systems reviewed and are negative.   See HPI for further details.     PAST MEDICAL HISTORY   has a past medical history of Anxiety, Bradycardia, Cellulitis, Crohn's disease (HCC), Ear infection, MRSA (methicillin resistant Staphylococcus aureus), RA (rheumatoid arthritis) ([facility]), and Smoker.    SURGICAL HISTORY   has a past surgical history that includes other abdominal surgery and other.    SOCIAL HISTORY  Social History     Tobacco Use   • Smoking status: Current Every Day Smoker     Packs/day: 0.50     Years: 32.00     Pack years: 16.00     Types: Cigarettes   • Smokeless tobacco:  "Never Used   Substance Use Topics   • Alcohol use: Not Currently   • Drug use: Never      Social History     Substance and Sexual Activity   Drug Use Never       FAMILY HISTORY  History reviewed. No pertinent family history.    CURRENT MEDICATIONS  Home Medications     Reviewed by Margaret Williamson R.N. (Registered Nurse) on 04/06/21 at 1510  Med List Status: Partial   Medication Last Dose Status   ALPRAZolam (XANAX) 0.25 MG Tab  Active   Biotin w/ Vitamins C & E (HAIR/SKIN/NAILS PO)  Active   loperamide (IMODIUM A-D) 2 MG tablet  Active   loperamide (IMODIUM) 2 MG Cap  Active   neomycin sulf/polymyx B sulf/HC soln (CORTISPORIN HC SOL) 3.5-24341-2 Solution  Active   ondansetron (ZOFRAN ODT) 4 MG TABLET DISPERSIBLE  Active   Probiotic Product (PROBIOTIC DAILY PO)  Active   QUEtiapine (SEROQUEL) 50 MG tablet  Active                ALLERGIES  Allergies   Allergen Reactions   • Pcn [Penicillins] Anaphylaxis     Tolerates Keflex 10/2020   • Toradol Hives     Migraine     • Tramadol Hives     migraine     • Bactrim [Sulfamethoxazole W-Trimethoprim] Itching     Face itching     • Stadol [Butorphanol] Unspecified     Pt reports it makes her feel like she can't wake up   • Bee Venom Anaphylaxis   • Blackberry [Rubus Fruticosus]      Oral swelling   • Fish Swelling   • Levaquin      Vomiting    • Tree Nuts Food Allergy        PHYSICAL EXAM  VITAL SIGNS: /94   Pulse (!) 120   Temp 37.2 °C (99 °F) (Temporal)   Resp 16   Ht 1.727 m (5' 8\")   Wt 72.3 kg (159 lb 6.3 oz)   SpO2 98%   BMI 24.24 kg/m²     Constitutional: Well developed, Well nourished, No acute distress, Non-toxic appearance.   HENT: Left ear canal with significant purulence and non visualized TM. Right TM is normal, but there is induration and edema inferior to the right mastoid process and the right upper neck. Normocephalic, Atraumatic, Bilateral external ears normal, Oropharynx is clear mucous membranes are moist. No oral exudates or nasal discharge. " "  Eyes: Pupils are equal round and reactive, EOMI, Conjunctiva normal, No discharge.   Neck: Normal range of motion, No tenderness, Supple, No stridor. No meningismus.  Lymphatic: No lymphadenopathy noted.   Cardiovascular: Tachycardic. Regular rhythm without murmur rub or gallop.  Thorax & Lungs: Clear breath sounds bilaterally without wheezes, rhonchi or rales. There is no chest wall tenderness.   Abdomen: Soft non-tender non-distended. There is no rebound or guarding. No organomegaly is appreciated. Bowel sounds are normal.  Skin: Normal without rash.   Back: No CVA or spinal tenderness.   Extremities: Intact distal pulses, No edema, No tenderness, No cyanosis, No clubbing. Capillary refill is less than 2 seconds.  Musculoskeletal: Good range of motion in all major joints. No tenderness to palpation or major deformities noted.   Neurologic: Alert & oriented x 3, Normal motor function, Normal sensory function, No focal deficits noted. Reflexes are normal.  Psychiatric: Affect normal, Judgment normal, Mood normal. There is no suicidal ideation or patient reported hallucinations.       DIAGNOSTIC STUDIES / PROCEDURES    LABS  Labs Reviewed   CBC WITH DIFFERENTIAL - Abnormal; Notable for the following components:       Result Value    MCHC 32.8 (*)     All other components within normal limits   COMP METABOLIC PANEL - Abnormal; Notable for the following components:    Glucose 108 (*)     Alkaline Phosphatase 155 (*)     Globulin 3.9 (*)     All other components within normal limits   BLOOD CULTURE    Narrative:     Per Hospital Policy: Only change Specimen Src: to \"Line\" if  specified by physician order.   LACTIC ACID   ESTIMATED GFR   LACTIC ACID   LACTIC ACID   BLOOD CULTURE      All labs reviewed by me.    RADIOLOGY  DX-CHEST-PORTABLE (1 VIEW)   Final Result      1.  There is no acute cardiopulmonary process.      CT-MAXILLOFACIAL WITH PLUS RECONS   Final Result      1.  There is mild asymmetric enhancement of the " parotid glands, more so on the right suggesting possible parotiditis.   2.  Debris or cerumen in the bilateral external auditory canals is again seen with no definite perirectal inflammation seen on the right at this time. Recommend clinical correlation however in regards to possible external otitis.   3.  There is questionable minimal heterogeneous enhancement in the left palatine tonsillar tissue which may represent inflammation.a   4.  Fluid in the left mastoid air cells has resolved.   5.  Dental disease again noted.        The radiologist's interpretation of all radiological studies have been reviewed by me.    COURSE & MEDICAL DECISION MAKING  Nursing notes, VS, PMSFHx reviewed in chart.    3:41 PM Patient seen and examined at bedside; Sepsis protocol activated. Ordered for CT-maxillofacial w/, DX-chest, lactic acid, CBC w/ diff, CMP, and Blood Culture to evaluate. Patient was treated with morphine 4 mg and Zofran 4 mg for her symptoms.      4:03 PM Patient treated with Cipro 400 mg and Vancocin 1750 mg.  We ultimately held on vancomycin as her lab work came back and showed a lactate of only 1.7 with no leukocytosis or shift and no electrolyte derangements.  Patient responded well to fluid and her heart rate came down.    CT of the maxillofacial area with IV contrast shows debris in the external canal on the left consistent with her otitis externa clinically and on the right there is no evidence of significant inflammation around the mastoid process and I think this is a simple cellulitis without abscess clinically as well as no evidence of abscess on CT    5:19 PM Patient was reevaluated at bedside; she has continued pain and will treat with additional medications. Discussed lab and radiology results with the patient and updated her on plan of care.     6:15 PM Return precautions were discussed with the patient, and they were cleared for discharge at this time. Patient was understanding and agreeable to  discharge.    I reviewed prescription monitoring program for patient's narcotic use before prescribing a scheduled drug.The patient will not drink alcohol nor drive with prescribed medications. The patient will return for new or worsening symptoms and is stable at the time of discharge.    The patient is referred to a primary physician for blood pressure management, diabetic screening, and for all other preventative health concerns.    In prescribing controlled substances to this patient, I certify that I have obtained and reviewed the medical history of Charley Dunn. I have also made a good david effort to obtain applicable records from other providers who have treated the patient and records did not demonstrate any increased risk of substance abuse that would prevent me from prescribing controlled substances.     I have conducted a physical exam and documented it. I have reviewed Ms. Dunn’s prescription history as maintained by the [place] Prescription Monitoring Program.     I have assessed the patient’s risk for abuse, dependency, and addiction using the validated Opioid Risk Tool available at https://www.mdcalc.com/kvaabm-qrka-nlob-ort-narcotic-abuse.     Given the above, I believe the benefits of controlled substance therapy outweigh the risks. The reasons for prescribing controlled substances include non-narcotic, oral analgesic alternatives have been inadequate for pain control. Accordingly, I have discussed the risk and benefits, treatment plan, and alternative therapies with the patient.       DISPOSITION:  Patient will be discharged home in stable condition.    FOLLOW UP:  No follow-up provider specified.    OUTPATIENT MEDICATIONS:  New Prescriptions    DOXYCYCLINE (MONODOX) 100 MG CAPSULE    Take 1 capsule by mouth 2 times a day for 7 days.    HYDROCODONE-ACETAMINOPHEN (NORCO) 5-325 MG TAB PER TABLET    Take 1 tablet by mouth every four hours as needed for up to 7 days.    NEOMYCIN SULF/POLYMYX B  SULF/HC SOLN (CORTISPORIN HC SOL) 3.5-37601-6 SOLUTION    Administer 3 Drops into affected ear(s) 4 times a day. Administer drops to both ears.     FINAL IMPRESSION  1. SIRS (systemic inflammatory response syndrome) (HCC)    2. Acute swimmer's ear of left side    3. Cellulitis of neck          Issa JENNINGS (Scribe), am scribing for, and in the presence of, Jose Palomino M.D..    Electronically signed by: Issa Mariee (Scribe), 4/6/2021    IJose M.D. personally performed the services described in this documentation, as scribed by Issa Mariee in my presence, and it is both accurate and complete.    The note accurately reflects work and decisions made by me.  Jose Palomino M.D.  4/6/2021  6:27 PM

## 2021-04-06 NOTE — ED NOTES
PIV has been placed and blood/1st set BC has been drawn and sent.  Pt medicated per MAR.  All monitoring in place.  Awaiting lab for 2nd set BC

## 2021-04-07 ENCOUNTER — HOSPITAL ENCOUNTER (INPATIENT)
Facility: MEDICAL CENTER | Age: 46
LOS: 1 days | DRG: 155 | End: 2021-04-09
Attending: EMERGENCY MEDICINE | Admitting: INTERNAL MEDICINE

## 2021-04-07 LAB
ALBUMIN SERPL BCP-MCNC: 4.2 G/DL (ref 3.2–4.9)
ALBUMIN/GLOB SERPL: 1.4 G/DL
ALP SERPL-CCNC: 143 U/L (ref 30–99)
ALT SERPL-CCNC: 15 U/L (ref 2–50)
ANION GAP SERPL CALC-SCNC: 12 MMOL/L (ref 7–16)
AST SERPL-CCNC: 11 U/L (ref 12–45)
BASOPHILS # BLD AUTO: 0.4 % (ref 0–1.8)
BASOPHILS # BLD: 0.03 K/UL (ref 0–0.12)
BILIRUB SERPL-MCNC: 0.5 MG/DL (ref 0.1–1.5)
BUN SERPL-MCNC: 8 MG/DL (ref 8–22)
CALCIUM SERPL-MCNC: 9.6 MG/DL (ref 8.5–10.5)
CHLORIDE SERPL-SCNC: 102 MMOL/L (ref 96–112)
CO2 SERPL-SCNC: 22 MMOL/L (ref 20–33)
CREAT SERPL-MCNC: 0.73 MG/DL (ref 0.5–1.4)
EOSINOPHIL # BLD AUTO: 0.24 K/UL (ref 0–0.51)
EOSINOPHIL NFR BLD: 3.6 % (ref 0–6.9)
ERYTHROCYTE [DISTWIDTH] IN BLOOD BY AUTOMATED COUNT: 47.8 FL (ref 35.9–50)
GLOBULIN SER CALC-MCNC: 3.1 G/DL (ref 1.9–3.5)
GLUCOSE SERPL-MCNC: 91 MG/DL (ref 65–99)
HCT VFR BLD AUTO: 36.9 % (ref 37–47)
HGB BLD-MCNC: 11.7 G/DL (ref 12–16)
IMM GRANULOCYTES # BLD AUTO: 0.03 K/UL (ref 0–0.11)
IMM GRANULOCYTES NFR BLD AUTO: 0.4 % (ref 0–0.9)
LYMPHOCYTES # BLD AUTO: 2.6 K/UL (ref 1–4.8)
LYMPHOCYTES NFR BLD: 38.5 % (ref 22–41)
MCH RBC QN AUTO: 28.8 PG (ref 27–33)
MCHC RBC AUTO-ENTMCNC: 32.1 G/DL (ref 33.6–35)
MCV RBC AUTO: 89.7 FL (ref 81.4–97.8)
MONOCYTES # BLD AUTO: 0.38 K/UL (ref 0–0.85)
MONOCYTES NFR BLD AUTO: 5.6 % (ref 0–13.4)
NEUTROPHILS # BLD AUTO: 3.48 K/UL (ref 2–7.15)
NEUTROPHILS NFR BLD: 51.5 % (ref 44–72)
NRBC # BLD AUTO: 0 K/UL
NRBC BLD-RTO: 0 /100 WBC
PLATELET # BLD AUTO: 246 K/UL (ref 164–446)
PMV BLD AUTO: 9.6 FL (ref 9–12.9)
POTASSIUM SERPL-SCNC: 4.5 MMOL/L (ref 3.6–5.5)
PROT SERPL-MCNC: 7.3 G/DL (ref 6–8.2)
RBC # BLD AUTO: 4.06 M/UL (ref 4.2–5.4)
SARS-COV-2 RNA RESP QL NAA+PROBE: NOTDETECTED
SODIUM SERPL-SCNC: 136 MMOL/L (ref 135–145)
SPECIMEN SOURCE: NORMAL
WBC # BLD AUTO: 6.8 K/UL (ref 4.8–10.8)

## 2021-04-07 PROCEDURE — 85025 COMPLETE CBC W/AUTO DIFF WBC: CPT

## 2021-04-07 PROCEDURE — 700111 HCHG RX REV CODE 636 W/ 250 OVERRIDE (IP): Performed by: INTERNAL MEDICINE

## 2021-04-07 PROCEDURE — 700102 HCHG RX REV CODE 250 W/ 637 OVERRIDE(OP): Performed by: INTERNAL MEDICINE

## 2021-04-07 PROCEDURE — U0005 INFEC AGEN DETEC AMPLI PROBE: HCPCS

## 2021-04-07 PROCEDURE — 700111 HCHG RX REV CODE 636 W/ 250 OVERRIDE (IP): Performed by: EMERGENCY MEDICINE

## 2021-04-07 PROCEDURE — 80053 COMPREHEN METABOLIC PANEL: CPT

## 2021-04-07 PROCEDURE — G0378 HOSPITAL OBSERVATION PER HR: HCPCS

## 2021-04-07 PROCEDURE — 700105 HCHG RX REV CODE 258: Performed by: EMERGENCY MEDICINE

## 2021-04-07 PROCEDURE — A9270 NON-COVERED ITEM OR SERVICE: HCPCS | Performed by: INTERNAL MEDICINE

## 2021-04-07 PROCEDURE — 96365 THER/PROPH/DIAG IV INF INIT: CPT

## 2021-04-07 PROCEDURE — 87040 BLOOD CULTURE FOR BACTERIA: CPT

## 2021-04-07 PROCEDURE — 99285 EMERGENCY DEPT VISIT HI MDM: CPT

## 2021-04-07 PROCEDURE — 96375 TX/PRO/DX INJ NEW DRUG ADDON: CPT

## 2021-04-07 PROCEDURE — 99220 PR INITIAL OBSERVATION CARE,LEVL III: CPT | Performed by: INTERNAL MEDICINE

## 2021-04-07 PROCEDURE — 96366 THER/PROPH/DIAG IV INF ADDON: CPT

## 2021-04-07 PROCEDURE — 96376 TX/PRO/DX INJ SAME DRUG ADON: CPT

## 2021-04-07 PROCEDURE — U0003 INFECTIOUS AGENT DETECTION BY NUCLEIC ACID (DNA OR RNA); SEVERE ACUTE RESPIRATORY SYNDROME CORONAVIRUS 2 (SARS-COV-2) (CORONAVIRUS DISEASE [COVID-19]), AMPLIFIED PROBE TECHNIQUE, MAKING USE OF HIGH THROUGHPUT TECHNOLOGIES AS DESCRIBED BY CMS-2020-01-R: HCPCS

## 2021-04-07 RX ORDER — OXYCODONE HYDROCHLORIDE AND ACETAMINOPHEN 5; 325 MG/1; MG/1
1-2 TABLET ORAL EVERY 4 HOURS PRN
Status: DISCONTINUED | OUTPATIENT
Start: 2021-04-07 | End: 2021-04-09 | Stop reason: HOSPADM

## 2021-04-07 RX ORDER — PROCHLORPERAZINE EDISYLATE 5 MG/ML
5-10 INJECTION INTRAMUSCULAR; INTRAVENOUS EVERY 4 HOURS PRN
Status: DISCONTINUED | OUTPATIENT
Start: 2021-04-07 | End: 2021-04-09 | Stop reason: HOSPADM

## 2021-04-07 RX ORDER — ONDANSETRON 2 MG/ML
4 INJECTION INTRAMUSCULAR; INTRAVENOUS EVERY 4 HOURS PRN
Status: DISCONTINUED | OUTPATIENT
Start: 2021-04-07 | End: 2021-04-09 | Stop reason: HOSPADM

## 2021-04-07 RX ORDER — PROMETHAZINE HYDROCHLORIDE 12.5 MG/1
12.5-25 SUPPOSITORY RECTAL EVERY 4 HOURS PRN
Status: DISCONTINUED | OUTPATIENT
Start: 2021-04-07 | End: 2021-04-09 | Stop reason: HOSPADM

## 2021-04-07 RX ORDER — HYDROCODONE BITARTRATE AND ACETAMINOPHEN 5; 325 MG/1; MG/1
1-2 TABLET ORAL EVERY 6 HOURS PRN
Status: DISCONTINUED | OUTPATIENT
Start: 2021-04-07 | End: 2021-04-07

## 2021-04-07 RX ORDER — CIPROFLOXACIN 500 MG/1
500 TABLET, FILM COATED ORAL EVERY 12 HOURS
Status: DISCONTINUED | OUTPATIENT
Start: 2021-04-07 | End: 2021-04-07

## 2021-04-07 RX ORDER — SODIUM CHLORIDE, SODIUM LACTATE, POTASSIUM CHLORIDE, AND CALCIUM CHLORIDE .6; .31; .03; .02 G/100ML; G/100ML; G/100ML; G/100ML
30 INJECTION, SOLUTION INTRAVENOUS ONCE
Status: COMPLETED | OUTPATIENT
Start: 2021-04-07 | End: 2021-04-07

## 2021-04-07 RX ORDER — PROMETHAZINE HYDROCHLORIDE 25 MG/1
12.5-25 TABLET ORAL EVERY 4 HOURS PRN
Status: DISCONTINUED | OUTPATIENT
Start: 2021-04-07 | End: 2021-04-09 | Stop reason: HOSPADM

## 2021-04-07 RX ORDER — ONDANSETRON 4 MG/1
4 TABLET, ORALLY DISINTEGRATING ORAL EVERY 4 HOURS PRN
Status: DISCONTINUED | OUTPATIENT
Start: 2021-04-07 | End: 2021-04-09 | Stop reason: HOSPADM

## 2021-04-07 RX ORDER — NICOTINE 21 MG/24HR
21 PATCH, TRANSDERMAL 24 HOURS TRANSDERMAL
Status: DISCONTINUED | OUTPATIENT
Start: 2021-04-08 | End: 2021-04-09 | Stop reason: HOSPADM

## 2021-04-07 RX ORDER — SENNOSIDES 25 MG/1
1 TABLET, FILM COATED ORAL EVERY MORNING
Status: ON HOLD | COMMUNITY
End: 2021-04-18

## 2021-04-07 RX ORDER — QUETIAPINE FUMARATE 100 MG/1
100 TABLET, FILM COATED ORAL
Status: DISCONTINUED | OUTPATIENT
Start: 2021-04-07 | End: 2021-04-09 | Stop reason: HOSPADM

## 2021-04-07 RX ORDER — CIPROFLOXACIN 2 MG/ML
400 INJECTION, SOLUTION INTRAVENOUS EVERY 8 HOURS
Status: DISCONTINUED | OUTPATIENT
Start: 2021-04-07 | End: 2021-04-08

## 2021-04-07 RX ORDER — MORPHINE SULFATE 4 MG/ML
4 INJECTION, SOLUTION INTRAMUSCULAR; INTRAVENOUS ONCE
Status: COMPLETED | OUTPATIENT
Start: 2021-04-07 | End: 2021-04-07

## 2021-04-07 RX ORDER — MORPHINE SULFATE 4 MG/ML
2 INJECTION, SOLUTION INTRAMUSCULAR; INTRAVENOUS EVERY 4 HOURS PRN
Status: DISCONTINUED | OUTPATIENT
Start: 2021-04-07 | End: 2021-04-09 | Stop reason: HOSPADM

## 2021-04-07 RX ORDER — ACETAMINOPHEN 325 MG/1
650 TABLET ORAL EVERY 6 HOURS PRN
Status: DISCONTINUED | OUTPATIENT
Start: 2021-04-07 | End: 2021-04-09 | Stop reason: HOSPADM

## 2021-04-07 RX ORDER — ALPRAZOLAM 0.25 MG/1
0.25 TABLET ORAL 2 TIMES DAILY PRN
Status: DISCONTINUED | OUTPATIENT
Start: 2021-04-07 | End: 2021-04-09 | Stop reason: HOSPADM

## 2021-04-07 RX ORDER — QUETIAPINE FUMARATE 100 MG/1
100 TABLET, FILM COATED ORAL
Status: ON HOLD | COMMUNITY
Start: 2021-03-30 | End: 2021-04-18 | Stop reason: SDUPTHER

## 2021-04-07 RX ADMIN — MORPHINE SULFATE 4 MG: 4 INJECTION INTRAVENOUS at 15:56

## 2021-04-07 RX ADMIN — OXYCODONE HYDROCHLORIDE AND ACETAMINOPHEN 2 TABLET: 5; 325 TABLET ORAL at 20:23

## 2021-04-07 RX ADMIN — CIPROFLOXACIN 400 MG: 2 INJECTION, SOLUTION INTRAVENOUS at 20:25

## 2021-04-07 RX ADMIN — QUETIAPINE FUMARATE 100 MG: 100 TABLET ORAL at 23:14

## 2021-04-07 RX ADMIN — SODIUM CHLORIDE, POTASSIUM CHLORIDE, SODIUM LACTATE AND CALCIUM CHLORIDE 1000 ML: 600; 310; 30; 20 INJECTION, SOLUTION INTRAVENOUS at 16:07

## 2021-04-07 RX ADMIN — NEOMYCIN SULFATE, POLYMYXIN B SULFATE, HYDROCORTISONE 3 DROP: 3.5; 10000; 1 SOLUTION/ DROPS AURICULAR (OTIC) at 21:47

## 2021-04-07 RX ADMIN — MORPHINE SULFATE 2 MG: 4 INJECTION INTRAVENOUS at 21:46

## 2021-04-07 ASSESSMENT — LIFESTYLE VARIABLES
HOW MANY TIMES IN THE PAST YEAR HAVE YOU HAD 5 OR MORE DRINKS IN A DAY: 0
EVER HAD A DRINK FIRST THING IN THE MORNING TO STEADY YOUR NERVES TO GET RID OF A HANGOVER: NO
TOTAL SCORE: 0
DOES PATIENT WANT TO STOP DRINKING: NO
ALCOHOL_USE: NO
TOTAL SCORE: 0
DO YOU DRINK ALCOHOL: NO
EVER FELT BAD OR GUILTY ABOUT YOUR DRINKING: NO
AVERAGE NUMBER OF DAYS PER WEEK YOU HAVE A DRINK CONTAINING ALCOHOL: 0
ON A TYPICAL DAY WHEN YOU DRINK ALCOHOL HOW MANY DRINKS DO YOU HAVE: 0
TOTAL SCORE: 0
HAVE YOU EVER FELT YOU SHOULD CUT DOWN ON YOUR DRINKING: NO
CONSUMPTION TOTAL: NEGATIVE
HAVE PEOPLE ANNOYED YOU BY CRITICIZING YOUR DRINKING: NO

## 2021-04-07 ASSESSMENT — ENCOUNTER SYMPTOMS
SHORTNESS OF BREATH: 0
ABDOMINAL PAIN: 0
CHILLS: 0
SINUS PAIN: 1
DIZZINESS: 0
NECK PAIN: 1
DIARRHEA: 0
VOMITING: 0
HEADACHES: 0
COUGH: 1
FEVER: 0
SORE THROAT: 0

## 2021-04-07 ASSESSMENT — PAIN DESCRIPTION - PAIN TYPE
TYPE: ACUTE PAIN

## 2021-04-07 ASSESSMENT — FIBROSIS 4 INDEX
FIB4 SCORE: 0.54
FIB4 SCORE: 0.67

## 2021-04-07 NOTE — ED NOTES
IV abx complete.  Pt tolerated well.   Vanco d/c'd by ERP.  Pt ready for discharge.  Pt given d/c instructions, f/u info and location for pick-up and admin instructions for RX x 3 with verbal understanding.  VSS at discharge.  PIV d/c'd with tip intact.  Pt dressed independently.  Pt's  bedside who is driving her home. Pt taken from ED via w/c by ED tech.  Escorted to the lobby for discharge.

## 2021-04-07 NOTE — ED PROVIDER NOTES
ED Provider Note    CHIEF COMPLAINT  Chief Complaint   Patient presents with    Ear Pain     r sided periauricular cellulitis dx yesterday here, unable to get rx filled       HPI  Charley Dunn is a 45 y.o. female who presents to the emergency department with worsening right ear and facial discomfort. She notes multiple repeats ER visits due to the same. She notes that she was initially seen at [facility] roughly 4 days ago. She had an overnight stay and was discharged two days ago on Monday. Was seen here in the ER yesterday after not being able to  her outpatient prescriptions due to financial constraints. Was again evaluated with bloodwork and had an additional CT completed. At that point patient was again discharged with instructions for completion of antibiotic course and was additionally provided pain medications as this is a leading causation of her bounced back. Again she was unable to fill the prescription due to financial constraints and she is now here with continued worsening of pain and redness and facial swelling.    REVIEW OF SYSTEMS  See HPI for further details. All other systems are negative.     PAST MEDICAL HISTORY   has a past medical history of Anxiety, Bradycardia, Cellulitis, Crohn's disease ([facility]), Ear infection, MRSA (methicillin resistant Staphylococcus aureus), RA (rheumatoid arthritis) ([facility]), and Smoker.    SOCIAL HISTORY  Social History     Tobacco Use    Smoking status: Current Every Day Smoker     Packs/day: 0.50     Years: 32.00     Pack years: 16.00     Types: Cigarettes    Smokeless tobacco: Never Used   Substance and Sexual Activity    Alcohol use: Not Currently    Drug use: Never    Sexual activity: Not on file       SURGICAL HISTORY   has a past surgical history that includes other abdominal surgery and other.    CURRENT MEDICATIONS  Home Medications       Reviewed by Nancy Llanos (Pharmacy Tech) on 04/07/21 at 1700  Med List Status: Complete  "    Medication Last Dose Status   ALPRAZolam (XANAX) 0.25 MG Tab >2 days Active   Biotin w/ Vitamins C & E (HAIR SKIN & NAILS GUMMIES PO) 4/6/2021 Active   doxycycline (MONODOX) 100 MG capsule New medication Active   HYDROcodone-acetaminophen (NORCO) 5-325 MG Tab per tablet New medication Active   loperamide (IMODIUM A-D) 2 MG tablet NOT TAKING Active   loperamide (IMODIUM) 2 MG Cap NOT TAKING Active   neomycin sulf/polymyx B sulf/HC soln (CORTISPORIN HC SOL) 3.5-29585-5 Solution DUPLICATE Active   neomycin sulf/polymyx B sulf/HC soln (CORTISPORIN HC SOL) 3.5-00141-1 Solution 4/7/2021 Active   ondansetron (ZOFRAN ODT) 4 MG TABLET DISPERSIBLE >1 week Active   Probiotic Product (RA PROBIOTIC GUMMIES) Chew Tab 4/7/2021 Active   QUEtiapine (SEROQUEL) 100 MG Tab 4/6/2021 Active   QUEtiapine (SEROQUEL) 50 MG tablet NOT TAKING Active                    ALLERGIES  Allergies   Allergen Reactions    Bee Venom Anaphylaxis    Blackberry [Rubus Fruticosus] Anaphylaxis     Oral swelling    Fish Anaphylaxis and Swelling    Levaquin Vomiting and Swelling     Tongue/mouth swelling    Pcn [Penicillins] Anaphylaxis     Tolerates Keflex 10/2020    Toradol Hives     + Migraine      Tramadol Hives     + Migraine      Tree Nuts Food Allergy Anaphylaxis    Bactrim [Sulfamethoxazole W-Trimethoprim] Itching     Face itching      Stadol [Butorphanol] Unspecified     Pt reports it makes her feel like she can't wake up       PHYSICAL EXAM  VITAL SIGNS: /79   Pulse 94   Temp 36.8 °C (98.2 °F) (Temporal)   Resp 16   Ht 1.727 m (5' 8\")   Wt 72.8 kg (160 lb 7.9 oz)   SpO2 97%   BMI 24.40 kg/m²  @[id number]::@   Pulse ox interpretation: I interpret this pulse ox as normal.  Constitutional: Alert in no apparent distress.  HENT: No signs of trauma, right irregular erythema and soft tissue swelling. No discernible direct mastoid tenderness although the entire area is tender to light palpation. Continues to have evidence of otitis externa. " Unable to visualize tympanic membrane secondary to pain with regular movement.  Eyes: Pupils are equal and reactive  Neck: Normal range of motion, No tenderness, Supple  Cardiovascular: Regular rate and rhythm, no murmurs.   Thorax & Lungs: Normal breath sounds, No respiratory distress  Abdomen: Bowel sounds normal, Soft, No tenderness  Skin: Warm, Dry, No erythema, No rash.   Extremities: Intact distal pulses   Musculoskeletal: Good range of motion in all major joints. No tenderness to palpation or major deformities noted.   Neurologic: Alert , Normal motor function, Normal sensory function, No focal deficits noted.   Psychiatric: Affect normal, Judgment normal, Mood normal.       DIAGNOSTIC STUDIES / PROCEDURES      LABS  Results for orders placed or performed during the hospital encounter of 04/07/21   CBC WITH DIFFERENTIAL   Result Value Ref Range    WBC 6.8 4.8 - 10.8 K/uL    RBC 4.06 (L) 4.20 - 5.40 M/uL    Hemoglobin 11.7 (L) 12.0 - 16.0 g/dL    Hematocrit 36.9 (L) 37.0 - 47.0 %    MCV 89.7 81.4 - 97.8 fL    MCH 28.8 27.0 - 33.0 pg    MCHC 32.1 (L) 33.6 - 35.0 g/dL    RDW 47.8 35.9 - 50.0 fL    Platelet Count 246 164 - 446 K/uL    MPV 9.6 9.0 - 12.9 fL    Neutrophils-Polys 51.50 44.00 - 72.00 %    Lymphocytes 38.50 22.00 - 41.00 %    Monocytes 5.60 0.00 - 13.40 %    Eosinophils 3.60 0.00 - 6.90 %    Basophils 0.40 0.00 - 1.80 %    Immature Granulocytes 0.40 0.00 - 0.90 %    Nucleated RBC 0.00 /100 WBC    Neutrophils (Absolute) 3.48 2.00 - 7.15 K/uL    Lymphs (Absolute) 2.60 1.00 - 4.80 K/uL    Monos (Absolute) 0.38 0.00 - 0.85 K/uL    Eos (Absolute) 0.24 0.00 - 0.51 K/uL    Baso (Absolute) 0.03 0.00 - 0.12 K/uL    Immature Granulocytes (abs) 0.03 0.00 - 0.11 K/uL    NRBC (Absolute) 0.00 K/uL   COMP METABOLIC PANEL   Result Value Ref Range    Sodium 136 135 - 145 mmol/L    Potassium 4.5 3.6 - 5.5 mmol/L    Chloride 102 96 - 112 mmol/L    Co2 22 20 - 33 mmol/L    Anion Gap 12.0 7.0 - 16.0    Glucose 91 65 - 99  mg/dL    Bun 8 8 - 22 mg/dL    Creatinine 0.73 0.50 - 1.40 mg/dL    Calcium 9.6 8.5 - 10.5 mg/dL    AST(SGOT) 11 (L) 12 - 45 U/L    ALT(SGPT) 15 2 - 50 U/L    Alkaline Phosphatase 143 (H) 30 - 99 U/L    Total Bilirubin 0.5 0.1 - 1.5 mg/dL    Albumin 4.2 3.2 - 4.9 g/dL    Total Protein 7.3 6.0 - 8.2 g/dL    Globulin 3.1 1.9 - 3.5 g/dL    A-G Ratio 1.4 g/dL   SARS-CoV-2 PCR (24 hour In-House): Collect NP swab in VTM    Specimen: Nasopharyngeal; Respirate   Result Value Ref Range    SARS-CoV-2 Source NP Swab     SARS-CoV-2 by PCR NotDetected    ESTIMATED GFR   Result Value Ref Range    GFR If African American >60 >60 mL/min/1.73 m 2    GFR If Non African American >60 >60 mL/min/1.73 m 2           COURSE & MEDICAL DECISION MAKING  Pertinent Labs & Imaging studies reviewed. (See chart for details)  patient presented emergency room with worsening infection. History as above. I do believe that the patient's infection is worsening secondary to noncompliance with outpatient regimen. She states that the noncompliance secondary to financial constraints. This point due to the fact that the patient has been admitted to [facility], seen here in the ER yesterday and now back today I do believe it'll be prudent the patient continue with IV antibiotics here as it does not appear that the patient will be able to successfully complete outpatient antibiotics and has failed that plan thus far. I discussed case with hospitals was agreeable to ongoing inpatient care at this time.    FINAL IMPRESSION  right facial cellulitis  right external otitis         Electronically signed by: Heraclio Yao M.D., 4/7/2021 3:27 PM

## 2021-04-07 NOTE — ED NOTES
New PIV placed and pt medicated for pain per MAR.  ABX infusing.  Plan to discharge patient after both IV abx per ERP.  Pt updated to POC, all needs met.

## 2021-04-07 NOTE — ED NOTES
Pt's IV appears infiltrated after CT with swelling and burning pain w/ flush.  PIV d/c'd with tip intact.  Kelley bedside to attempt US guided PIV.

## 2021-04-07 NOTE — ED TRIAGE NOTES
"Chief Complaint   Patient presents with   • Ear Pain     r sided periauricular cellulitis dx yesterday here, unable to get rx filled     Pt wheeled to triage for above complaint. R side periauricular area, red, warm to touch. Was given IV rocephin yesterday, had CT done and sent home with ABX. Reports chills last night and this am.     Pt is alert and oriented, speaking in full sentences, follows commands and responds appropriately to questions. Resp are even and unlabored. No behavioral indicators of pain.   Pt placed in lobby. Pt educated on triage process. Pt encouraged to alert staff for any changes.    /84   Pulse (!) 106   Temp 36.2 °C (97.2 °F) (Temporal)   Resp 16   Ht 1.727 m (5' 8\")   Wt 72 kg (158 lb 11.7 oz)   SpO2 100%   BMI 24.14 kg/m²     "

## 2021-04-08 PROBLEM — R19.7 DIARRHEA: Status: ACTIVE | Noted: 2021-04-08

## 2021-04-08 LAB
ANION GAP SERPL CALC-SCNC: 9 MMOL/L (ref 7–16)
BASOPHILS # BLD AUTO: 0.3 % (ref 0–1.8)
BASOPHILS # BLD: 0.02 K/UL (ref 0–0.12)
BUN SERPL-MCNC: 9 MG/DL (ref 8–22)
CALCIUM SERPL-MCNC: 8.7 MG/DL (ref 8.5–10.5)
CHLORIDE SERPL-SCNC: 108 MMOL/L (ref 96–112)
CO2 SERPL-SCNC: 24 MMOL/L (ref 20–33)
CREAT SERPL-MCNC: 0.75 MG/DL (ref 0.5–1.4)
EOSINOPHIL # BLD AUTO: 0.27 K/UL (ref 0–0.51)
EOSINOPHIL NFR BLD: 4.2 % (ref 0–6.9)
ERYTHROCYTE [DISTWIDTH] IN BLOOD BY AUTOMATED COUNT: 46.5 FL (ref 35.9–50)
FERRITIN SERPL-MCNC: 20.8 NG/ML (ref 10–291)
GLUCOSE SERPL-MCNC: 111 MG/DL (ref 65–99)
HCT VFR BLD AUTO: 32.7 % (ref 37–47)
HGB BLD-MCNC: 10.9 G/DL (ref 12–16)
HGB RETIC QN AUTO: 30.7 PG/CELL (ref 29–35)
IMM GRANULOCYTES # BLD AUTO: 0.02 K/UL (ref 0–0.11)
IMM GRANULOCYTES NFR BLD AUTO: 0.3 % (ref 0–0.9)
IMM RETICS NFR: 15.6 % (ref 9.3–17.4)
IRON SATN MFR SERPL: 20 % (ref 15–55)
IRON SERPL-MCNC: 54 UG/DL (ref 40–170)
LYMPHOCYTES # BLD AUTO: 2.77 K/UL (ref 1–4.8)
LYMPHOCYTES NFR BLD: 42.8 % (ref 22–41)
MCH RBC QN AUTO: 29.1 PG (ref 27–33)
MCHC RBC AUTO-ENTMCNC: 33.3 G/DL (ref 33.6–35)
MCV RBC AUTO: 87.2 FL (ref 81.4–97.8)
MONOCYTES # BLD AUTO: 0.39 K/UL (ref 0–0.85)
MONOCYTES NFR BLD AUTO: 6 % (ref 0–13.4)
NEUTROPHILS # BLD AUTO: 3 K/UL (ref 2–7.15)
NEUTROPHILS NFR BLD: 46.4 % (ref 44–72)
NRBC # BLD AUTO: 0 K/UL
NRBC BLD-RTO: 0 /100 WBC
PLATELET # BLD AUTO: 239 K/UL (ref 164–446)
PMV BLD AUTO: 9.5 FL (ref 9–12.9)
POTASSIUM SERPL-SCNC: 3.5 MMOL/L (ref 3.6–5.5)
RBC # BLD AUTO: 3.75 M/UL (ref 4.2–5.4)
RETICS # AUTO: 0.08 M/UL (ref 0.04–0.06)
RETICS/RBC NFR: 2.1 % (ref 0.8–2.1)
SODIUM SERPL-SCNC: 141 MMOL/L (ref 135–145)
TIBC SERPL-MCNC: 266 UG/DL (ref 250–450)
TSH SERPL DL<=0.005 MIU/L-ACNC: 4.44 UIU/ML (ref 0.38–5.33)
UIBC SERPL-MCNC: 212 UG/DL (ref 110–370)
VIT B12 SERPL-MCNC: 447 PG/ML (ref 211–911)
WBC # BLD AUTO: 6.5 K/UL (ref 4.8–10.8)

## 2021-04-08 PROCEDURE — 700111 HCHG RX REV CODE 636 W/ 250 OVERRIDE (IP): Performed by: NURSE PRACTITIONER

## 2021-04-08 PROCEDURE — 84443 ASSAY THYROID STIM HORMONE: CPT

## 2021-04-08 PROCEDURE — 85046 RETICYTE/HGB CONCENTRATE: CPT

## 2021-04-08 PROCEDURE — 85025 COMPLETE CBC W/AUTO DIFF WBC: CPT

## 2021-04-08 PROCEDURE — 700111 HCHG RX REV CODE 636 W/ 250 OVERRIDE (IP): Performed by: INTERNAL MEDICINE

## 2021-04-08 PROCEDURE — 700102 HCHG RX REV CODE 250 W/ 637 OVERRIDE(OP): Performed by: NURSE PRACTITIONER

## 2021-04-08 PROCEDURE — A9270 NON-COVERED ITEM OR SERVICE: HCPCS | Performed by: NURSE PRACTITIONER

## 2021-04-08 PROCEDURE — A9270 NON-COVERED ITEM OR SERVICE: HCPCS | Performed by: INTERNAL MEDICINE

## 2021-04-08 PROCEDURE — 770001 HCHG ROOM/CARE - MED/SURG/GYN PRIV*

## 2021-04-08 PROCEDURE — 82728 ASSAY OF FERRITIN: CPT

## 2021-04-08 PROCEDURE — 82607 VITAMIN B-12: CPT

## 2021-04-08 PROCEDURE — 700105 HCHG RX REV CODE 258: Performed by: INTERNAL MEDICINE

## 2021-04-08 PROCEDURE — 700102 HCHG RX REV CODE 250 W/ 637 OVERRIDE(OP): Performed by: INTERNAL MEDICINE

## 2021-04-08 PROCEDURE — 96376 TX/PRO/DX INJ SAME DRUG ADON: CPT

## 2021-04-08 PROCEDURE — 80048 BASIC METABOLIC PNL TOTAL CA: CPT

## 2021-04-08 PROCEDURE — 83550 IRON BINDING TEST: CPT

## 2021-04-08 PROCEDURE — 96366 THER/PROPH/DIAG IV INF ADDON: CPT

## 2021-04-08 PROCEDURE — 700105 HCHG RX REV CODE 258: Performed by: NURSE PRACTITIONER

## 2021-04-08 PROCEDURE — 99233 SBSQ HOSP IP/OBS HIGH 50: CPT | Performed by: INTERNAL MEDICINE

## 2021-04-08 PROCEDURE — 83540 ASSAY OF IRON: CPT

## 2021-04-08 PROCEDURE — 96367 TX/PROPH/DG ADDL SEQ IV INF: CPT

## 2021-04-08 RX ORDER — POTASSIUM CHLORIDE 20 MEQ/1
20 TABLET, EXTENDED RELEASE ORAL ONCE
Status: COMPLETED | OUTPATIENT
Start: 2021-04-08 | End: 2021-04-08

## 2021-04-08 RX ADMIN — MORPHINE SULFATE 2 MG: 4 INJECTION INTRAVENOUS at 06:34

## 2021-04-08 RX ADMIN — NEOMYCIN SULFATE, POLYMYXIN B SULFATE, HYDROCORTISONE 3 DROP: 3.5; 10000; 1 SOLUTION/ DROPS AURICULAR (OTIC) at 18:58

## 2021-04-08 RX ADMIN — POTASSIUM CHLORIDE 20 MEQ: 1500 TABLET, EXTENDED RELEASE ORAL at 07:48

## 2021-04-08 RX ADMIN — NEOMYCIN SULFATE, POLYMYXIN B SULFATE, HYDROCORTISONE 3 DROP: 3.5; 10000; 1 SOLUTION/ DROPS AURICULAR (OTIC) at 10:47

## 2021-04-08 RX ADMIN — NEOMYCIN SULFATE, POLYMYXIN B SULFATE, HYDROCORTISONE 3 DROP: 3.5; 10000; 1 SOLUTION/ DROPS AURICULAR (OTIC) at 21:26

## 2021-04-08 RX ADMIN — QUETIAPINE FUMARATE 100 MG: 100 TABLET ORAL at 21:36

## 2021-04-08 RX ADMIN — SODIUM CHLORIDE 1.5 G: 900 INJECTION INTRAVENOUS at 18:58

## 2021-04-08 RX ADMIN — ALPRAZOLAM 0.25 MG: 0.25 TABLET ORAL at 19:47

## 2021-04-08 RX ADMIN — VANCOMYCIN HYDROCHLORIDE 125 MG: KIT ORAL at 18:58

## 2021-04-08 RX ADMIN — NICOTINE TRANSDERMAL SYSTEM 21 MG: 21 PATCH, EXTENDED RELEASE TRANSDERMAL at 05:21

## 2021-04-08 RX ADMIN — MORPHINE SULFATE 2 MG: 4 INJECTION INTRAVENOUS at 21:26

## 2021-04-08 RX ADMIN — ALPRAZOLAM 0.25 MG: 0.25 TABLET ORAL at 10:45

## 2021-04-08 RX ADMIN — CIPROFLOXACIN 400 MG: 2 INJECTION, SOLUTION INTRAVENOUS at 07:47

## 2021-04-08 RX ADMIN — NEOMYCIN SULFATE, POLYMYXIN B SULFATE, HYDROCORTISONE 3 DROP: 3.5; 10000; 1 SOLUTION/ DROPS AURICULAR (OTIC) at 16:25

## 2021-04-08 RX ADMIN — MORPHINE SULFATE 2 MG: 4 INJECTION INTRAVENOUS at 10:47

## 2021-04-08 RX ADMIN — VANCOMYCIN HYDROCHLORIDE 1000 MG: 500 INJECTION, POWDER, LYOPHILIZED, FOR SOLUTION INTRAVENOUS at 05:20

## 2021-04-08 RX ADMIN — MORPHINE SULFATE 2 MG: 4 INJECTION INTRAVENOUS at 02:31

## 2021-04-08 RX ADMIN — MORPHINE SULFATE 2 MG: 4 INJECTION INTRAVENOUS at 16:24

## 2021-04-08 ASSESSMENT — ENCOUNTER SYMPTOMS
ABDOMINAL PAIN: 0
BLOOD IN STOOL: 0
COUGH: 0
DIARRHEA: 1
FEVER: 0
VOMITING: 0
SORE THROAT: 0
NAUSEA: 0
DIZZINESS: 0
MYALGIAS: 0

## 2021-04-08 ASSESSMENT — PAIN DESCRIPTION - PAIN TYPE
TYPE: ACUTE PAIN

## 2021-04-08 ASSESSMENT — PAIN SCALES - WONG BAKER
WONGBAKER_NUMERICALRESPONSE: HURTS A LITTLE MORE
WONGBAKER_NUMERICALRESPONSE: HURTS A LITTLE MORE

## 2021-04-08 NOTE — H&P
Hospital Medicine History & Physical Note    Date of Service  4/7/2021    Primary Care Physician  Pcp Pt States None    Consultants  None    Code Status  Full    Chief Complaint  Chief Complaint   Patient presents with   • Ear Pain     r sided periauricular cellulitis dx yesterday here, unable to get rx filled       History of Presenting Illness  45 y.o. female who presented 4/7/2021 with right ear pain.  PMH RA and Crohn's disease not on medication, recurrent otitis externa for 13 years per patient.  She would intermittently need antibiotics.  Her first visit with ENT will be next Tuesday, she has not been able to follow-up with ENT until now since she is been unable to pay for the visit.  She was recently at [facility] ED x2 for worsening right ear pain.  She was given oral antibiotics but was unable to fill the prescriptions.  She ended up hospitalized after her third visit and discharged on oral doxycycline.  She says she had some improvement before had worsening ear pain again.  She has had brownish-red discharge from her right ear.  Her pain extends down her neck to her lymph nodes.  She denies chest pain, shortness of breath, sore throat, fevers or chills.  She denies any diarrhea.  She went to the ED yesterday and had a CAT scan and discharged on oral antibiotics.  She was unable to fill this and returned to the ED today.  She says her left ear is also starting to have pain.    Review of Systems  Review of Systems   Constitutional: Negative for chills and fever.   HENT: Positive for ear discharge, ear pain and sinus pain. Negative for congestion, nosebleeds and sore throat.    Respiratory: Positive for cough (Mild, chronic from tobacco). Negative for shortness of breath.    Cardiovascular: Negative for chest pain.   Gastrointestinal: Negative for abdominal pain, diarrhea and vomiting.   Genitourinary: Negative for dysuria and hematuria.   Musculoskeletal: Positive for neck pain.   Neurological: Negative  for dizziness and headaches.   All other systems reviewed and are negative.      Past Medical History   has a past medical history of Anxiety, Bradycardia, Cellulitis, Crohn's disease ([facility]), Ear infection, MRSA (methicillin resistant Staphylococcus aureus), RA (rheumatoid arthritis) ([facility]), and Smoker.    Surgical History   has a past surgical history that includes other abdominal surgery and other.     Family History  No significant pertinent family history    Social History   reports that she has been smoking cigarettes. She has a 16.00 pack-year smoking history. She has never used smokeless tobacco. She reports previous alcohol use. She reports that she does not use drugs.    Allergies  Allergies   Allergen Reactions   • Bee Venom Anaphylaxis   • Blackberry [Rubus Fruticosus] Anaphylaxis     Oral swelling   • Fish Anaphylaxis and Swelling   • Levaquin Vomiting and Swelling     Tongue/mouth swelling   • Pcn [Penicillins] Anaphylaxis     Tolerates Keflex 10/2020   • Toradol Hives     + Migraine     • Tramadol Hives     + Migraine     • Tree Nuts Food Allergy Anaphylaxis   • Bactrim [Sulfamethoxazole W-Trimethoprim] Itching     Face itching     • Stadol [Butorphanol] Unspecified     Pt reports it makes her feel like she can't wake up       Medications  Prior to Admission Medications   Prescriptions Last Dose Informant Patient Reported? Taking?   ALPRAZolam (XANAX) 0.25 MG Tab >2 days at PRN Patient Yes No   Sig: Take 0.25 mg by mouth 2 times a day as needed for Sleep.   Biotin w/ Vitamins C & E (HAIR SKIN & NAILS GUMMIES PO) 4/6/2021 at AM Patient Yes Yes   Sig: Take 3 Tablets by mouth every morning.   HYDROcodone-acetaminophen (NORCO) 5-325 MG Tab per tablet New medication at NOT STARTED Patient No No   Sig: Take 1 tablet by mouth every four hours as needed for up to 7 days.   Patient taking differently: Take 1 tablet by mouth every four hours as needed. New medication NOT STARTED.  Indications: Pain   Probiotic  Product (RA PROBIOTIC GUMMIES) Chew Tab 4/7/2021 at 0700 Patient Yes Yes   Sig: Chew 1 tablet every morning.   QUEtiapine (SEROQUEL) 100 MG Tab 4/6/2021 at 2100 Patient Yes Yes   Sig: Take 100 mg by mouth at bedtime.   QUEtiapine (SEROQUEL) 50 MG tablet NOT TAKING at NOT TAKING Patient No No   Sig: Take 1 Tab by mouth every bedtime.   Patient not taking: Reported on 4/7/2021   doxycycline (MONODOX) 100 MG capsule New medication at NOT STARTED Patient No No   Sig: Take 1 capsule by mouth 2 times a day for 7 days.   Patient taking differently: Take 100 mg by mouth 2 times a day. New medication NOT STARTED.   loperamide (IMODIUM A-D) 2 MG tablet NOT TAKING at NOT TAKING Patient No No   Sig: Take 2 tablets for diarrhea and one tablet after each loose stool to max 8 tablets daily   Patient not taking: Reported on 4/7/2021   loperamide (IMODIUM) 2 MG Cap NOT TAKING at NOT TAKING Patient No No   Sig: Take 1 Cap by mouth 4 times a day as needed for Diarrhea.   Patient not taking: Reported on 4/7/2021   neomycin sulf/polymyx B sulf/HC soln (CORTISPORIN HC SOL) 3.5-82572-8 Solution DUPLICATE at DUPLICATE Patient No No   Sig: Place 3 Drops in right ear 4 times a day. Administer drops to both ears.   Patient not taking: Reported on 4/7/2021   neomycin sulf/polymyx B sulf/HC soln (CORTISPORIN HC SOL) 3.5-13878-8 Solution 4/7/2021 at 0700 Patient No No   Sig: Administer 3 Drops into affected ear(s) 4 times a day. Administer drops to both ears.   ondansetron (ZOFRAN ODT) 4 MG TABLET DISPERSIBLE >1 week at PRN Patient No No   Sig: Take 1 Tab by mouth every four hours as needed (give PO if no IV route available).   Patient taking differently: Take 4 mg by mouth every four hours as needed for Nausea (give PO if no IV route available).      Facility-Administered Medications: None       Physical Exam  Temp:  [36.2 °C (97.2 °F)-36.8 °C (98.2 °F)] 36.8 °C (98.2 °F)  Pulse:  [] 94  Resp:  [16-18] 16  BP: (109-138)/(72-84)  138/79  SpO2:  [95 %-100 %] 97 %    Physical Exam  Vitals and nursing note reviewed.   Constitutional:       General: She is not in acute distress.     Appearance: She is not ill-appearing, toxic-appearing or diaphoretic.   HENT:      Head: Normocephalic.      Ears:      Comments: Left ear canal with erythema and swelling with mild effusion, no external canal drainage seen  Right ear canal with erythema and swelling and unable to visualize TM, no external canal drainage seen     Nose: Nose normal.      Mouth/Throat:      Mouth: Mucous membranes are moist.      Pharynx: No oropharyngeal exudate or posterior oropharyngeal erythema.   Eyes:      General:         Right eye: No discharge.         Left eye: No discharge.   Cardiovascular:      Rate and Rhythm: Normal rate and regular rhythm.   Pulmonary:      Effort: Pulmonary effort is normal. No respiratory distress.      Breath sounds: No wheezing or rales.   Abdominal:      Palpations: Abdomen is soft.      Tenderness: There is no abdominal tenderness. There is no guarding or rebound.   Musculoskeletal:      Cervical back: Neck supple.      Right lower leg: No edema.      Left lower leg: No edema.   Lymphadenopathy:      Cervical: Cervical adenopathy (Right side) present.   Skin:     General: Skin is warm and dry.   Neurological:      General: No focal deficit present.      Mental Status: She is alert and oriented to person, place, and time.   Psychiatric:         Mood and Affect: Mood normal.         Behavior: Behavior normal.         Laboratory:  Recent Labs     04/06/21  1555 04/07/21  1557   WBC 10.4 6.8   RBC 5.17 4.06*   HEMOGLOBIN 14.7 11.7*   HEMATOCRIT 44.8 36.9*   MCV 86.7 89.7   MCH 28.4 28.8   MCHC 32.8* 32.1*   RDW 45.9 47.8   PLATELETCT 316 246   MPV 9.4 9.6     Recent Labs     04/06/21  1555 04/07/21  1557   SODIUM 136 136   POTASSIUM 4.2 4.5   CHLORIDE 102 102   CO2 26 22   GLUCOSE 108* 91   BUN 9 8   CREATININE 0.73 0.73   CALCIUM 9.9 9.6     Recent  Labs     04/06/21  1555 04/07/21  1557   ALTSGPT 22 15   ASTSGOT 21 11*   ALKPHOSPHAT 155* 143*   TBILIRUBIN 0.3 0.5   GLUCOSE 108* 91         No results for input(s): NTPROBNP in the last 72 hours.      No results for input(s): TROPONINT in the last 72 hours.    Imaging:  No orders to display         Assessment/Plan:  I anticipate this patient is appropriate for observation status at this time.    * Otitis externa, preauricular cellulitis with parotiditis, otitis media with mastoiditis, failed outpatient antibiotics- (present on admission)  Assessment & Plan  Recurrent and has appointment with ENT next Tuesday   Was unable to fill antibiotic prescription  We will admit with IV vancomycin and ciprofloxacin, she has had a dose and has tolerated  Cortisporin ear drops bilaterally  Pain control  If she improves symptomatically, may be able to discharge with oral antibiotics.  She will need assistance to fill prescription.    Tobacco dependence- (present on admission)  Assessment & Plan  Nicotine ordered    Normocytic anemia- (present on admission)  Assessment & Plan  Hemoglobin decreased from recent lab  No signs of significant bleeding  Trend hemoglobin for now  Check iron panel, reticulocyte, TSH, vitamin B12    Crohn disease (HCC)- (present on admission)  Assessment & Plan  History of, not on medications  Currently no diarrhea    Rheumatoid arthritis (HCC)- (present on admission)  Assessment & Plan  Chronic   not on medications

## 2021-04-08 NOTE — HOSPITAL COURSE
45 y.o. female who presented 4/7/2021 with right ear pain.  PMH RA and Crohn's disease not on medication, recurrent otitis externa for 13 years per patient.  She would intermittently need antibiotics.  Her first visit with ENT will be next Tuesday, she has not been able to follow-up with ENT until now since she is been unable to pay for the visit.  She was recently at [facility] ED x2 for worsening right ear pain.  She was given oral antibiotics but was unable to fill the prescriptions.  She ended up hospitalized after her third visit and discharged on oral doxycycline.  She says she had some improvement before had worsening ear pain again.  She has had brownish-red discharge from her right ear.  Her pain extends down her neck to her lymph nodes.  She denies chest pain, shortness of breath, sore throat, fevers or chills.  She denies any diarrhea.  She went to the ED yesterday and had a CAT scan and discharged on oral antibiotics.  She was unable to fill this and returned to the ED

## 2021-04-08 NOTE — ED NOTES
Med rec completed per Pt at bedside.  Allergies reviewed with Pt.  No ORAL antibiotics in last 14 days. Pt was prescribed a 7 day course of doxycyline monohydrate 100 mg 1 capsule twice per day yesterday 4/6/2021, however Pt has not started this medication.

## 2021-04-08 NOTE — ASSESSMENT & PLAN NOTE
Recurrent and has appointment with ENT next Tuesday   Was unable to fill antibiotic prescription  Switch abx to Cefuroxime/flagyl  Cortisporin ear drops bilaterally  Pain control  If she improves symptomatically, may be able to discharge with oral antibiotics.  She will need assistance to fill prescription.

## 2021-04-08 NOTE — PROGRESS NOTES
2 RN Skin Check    2 RN skin check complete.   Devices in place: n/a.  Skin assessed under devices: no and N\A.  Confirmed pressure ulcers found on: n/a.  New potential pressure ulcers noted on n/a. Wound consult placed N/A.  The following interventions in place Pillows.    Erythema to right ear

## 2021-04-08 NOTE — PROGRESS NOTES
"Pharmacy Kinetics 45 y.o. female on vancomycin day # 1 2021    Indication for Treatment: Otitis externa    Pertinent history per medical record: Admitted on 2021 for ear pain related to otitis externa - patient seen previously for same complaint but was unable to obtain oral antibiotic therapy. Empiric antibiotic therapy initiated.    Other antibiotics: Ciprofloxacin 400 mg IV q8h    Allergies: Bee venom, Blackberry [rubus fruticosus], Fish, Levaquin, Pcn [penicillins], Toradol, Tramadol, Tree nuts food allergy, Bactrim [sulfamethoxazole w-trimethoprim], and Stadol [butorphanol]     List concerns for renal function: None    Pertinent cultures to date:     21 PBC x 2     NGTD    Recent Labs     21  1555 21  1557   WBC 10.4 6.8   NEUTSPOLYS 63.00 51.50     Recent Labs     21  1555 21  1557   BUN 9 8   CREATININE 0.73 0.73   ALBUMIN 4.3 4.2     /84   Pulse 94   Temp 36.7 °C (98 °F) (Temporal)   Resp 18   Ht 1.727 m (5' 8\")   Wt 72.8 kg (160 lb 7.9 oz)   SpO2 96%  Temp (24hrs), Av.4 °C (97.6 °F), Min:36.2 °C (97.2 °F), Max:36.7 °C (98 °F)      A/P   1. Vancomycin dose change: Give 1750 mg IV loading dose followed by 1000 mg IV q12h scheduled dosing  2. Next vancomycin level: Trough in ~2 days (not ordered)  3. Goal trough: 10-15 mcg/mL  4. Comments: Therapy with vancomycin initiated empirically for otitis externa. With limited concerns for accumulation will provide loading dose and start scheduled dosing thereafter as outlined above. Will plan to check trough level when patient closer to steady state in order to ensure appropriate clearance and drug levels. Recommend de-escalation if MRSA can be ruled out.  Pharmacy will continue to follow.     Corona Dumas PharmD, BCPS    "

## 2021-04-08 NOTE — ED NOTES
Pt to T201-00 with transport, pt in possession of all belongings, family accompanying, report has been called.

## 2021-04-08 NOTE — PROGRESS NOTES
Pt awake,a&ox4,c/o anxiety,pt states pt had long term abx before,had picc line,pt is having good piv now,on iv abx,patient also said she had c-diff before after taking cipro,poc explained,no c/o pain now.

## 2021-04-08 NOTE — PROGRESS NOTES
[facility] Medicine Daily Progress Note    Date of Service  4/8/2021    Chief Complaint  45 y.o. female admitted 4/7/2021 with facial cellulitis    Hospital Course  45 y.o. female who presented 4/7/2021 with right ear pain.  PMH RA and Crohn's disease not on medication, recurrent otitis externa for 13 years per patient.  She would intermittently need antibiotics.  Her first visit with ENT will be next Tuesday, she has not been able to follow-up with ENT until now since she is been unable to pay for the visit.  She was recently at [facility] ED x2 for worsening right ear pain.  She was given oral antibiotics but was unable to fill the prescriptions.  She ended up hospitalized after her third visit and discharged on oral doxycycline.  She says she had some improvement before had worsening ear pain again.  She has had brownish-red discharge from her right ear.  Her pain extends down her neck to her lymph nodes.  She denies chest pain, shortness of breath, sore throat, fevers or chills.  She denies any diarrhea.  She went to the ED yesterday and had a CAT scan and discharged on oral antibiotics.  She was unable to fill this and returned to the ED       Interval Problem Update  VSS and WNL with exception of mild hypotension  WBC normal  Patient reports ongoing pain and trismus  Reviewed CT scan of the maxillofacial structures from 4/6/2021  She has ongoing erythema and induration in the preauricular right mandibular joint region.  There is associated lymphadenopathy  She also reports 3 loose foul-smelling stools today have been watery as well as a history of C. difficile colitis in the past    Consultants/Specialty  None    Code Status  Full Code    Disposition  Full    Review of Systems  Review of Systems   Constitutional: Negative for fever and malaise/fatigue.   HENT: Positive for ear discharge and ear pain. Negative for sore throat.    Respiratory: Negative for cough.    Cardiovascular: Negative for chest pain.    Gastrointestinal: Positive for diarrhea. Negative for abdominal pain, blood in stool, melena, nausea and vomiting.   Genitourinary: Negative for dysuria.   Musculoskeletal: Negative for myalgias.   Neurological: Negative for dizziness.   All other systems reviewed and are negative.       Physical Exam  Temp:  [35.9 °C (96.7 °F)-36.8 °C (98.2 °F)] 36.4 °C (97.6 °F)  Pulse:  [] 77  Resp:  [14-20] 20  BP: ()/(54-84) 97/72  SpO2:  [94 %-100 %] 96 %    Physical Exam  Vitals and nursing note reviewed.   Constitutional:       General: She is not in acute distress.  HENT:      Head: Normocephalic and atraumatic.      Right Ear: Swelling and tenderness present. There is mastoid tenderness. Tympanic membrane is not injected, perforated or erythematous.      Nose: Nose normal.   Eyes:      Pupils: Pupils are equal, round, and reactive to light.   Cardiovascular:      Rate and Rhythm: Normal rate and regular rhythm.      Heart sounds: Normal heart sounds.   Pulmonary:      Effort: Pulmonary effort is normal.      Breath sounds: Normal breath sounds.   Abdominal:      General: There is no distension.      Palpations: Abdomen is soft.   Musculoskeletal:      Cervical back: Neck supple.   Lymphadenopathy:      Head:      Right side of head: Submandibular and preauricular adenopathy present.      Cervical: Cervical adenopathy present.   Skin:     Comments: Erythemic rash in the preauricular region at right   Neurological:      Mental Status: She is alert and oriented to person, place, and time.   Psychiatric:         Mood and Affect: Mood normal.         Fluids    Intake/Output Summary (Last 24 hours) at 4/8/2021 1112  Last data filed at 4/8/2021 0930  Gross per 24 hour   Intake 350 ml   Output no documentation   Net 350 ml       Laboratory  Recent Labs     04/06/21  1555 04/07/21  1557 04/08/21  0324   WBC 10.4 6.8 6.5   RBC 5.17 4.06* 3.75*   HEMOGLOBIN 14.7 11.7* 10.9*   HEMATOCRIT 44.8 36.9* 32.7*   MCV 86.7 89.7  87.2   MCH 28.4 28.8 29.1   MCHC 32.8* 32.1* 33.3*   RDW 45.9 47.8 46.5   PLATELETCT 316 246 239   MPV 9.4 9.6 9.5     Recent Labs     04/06/21  1555 04/07/21  1557 04/08/21  0324   SODIUM 136 136 141   POTASSIUM 4.2 4.5 3.5*   CHLORIDE 102 102 108   CO2 26 22 24   GLUCOSE 108* 91 111*   BUN 9 8 9   CREATININE 0.73 0.73 0.75   CALCIUM 9.9 9.6 8.7                   Imaging  No orders to display        Assessment/Plan  * Otitis externa, preauricular cellulitis with parotiditis- (present on admission)  Assessment & Plan  Recurrent and has appointment with ENT next Tuesday   Was unable to fill antibiotic prescription  Switch abx to Cefuroxime/flagyl  Cortisporin ear drops bilaterally  Pain control  If she improves symptomatically, may be able to discharge with oral antibiotics.  She will need assistance to fill prescription.    Tobacco dependence- (present on admission)  Assessment & Plan  Nicotine ordered    Diarrhea  Assessment & Plan  Hx of C-diff  C-diff r/o  Prophylactic dosing BID until stool studies return    Normocytic anemia- (present on admission)  Assessment & Plan  Hemoglobin decreased from recent lab  No signs of significant bleeding  Trend hemoglobin for now  Check iron panel, reticulocyte, TSH, vitamin B12    Crohn disease (HCC)- (present on admission)  Assessment & Plan  History of, not on medications  Currently no diarrhea    Rheumatoid arthritis (HCC)- (present on admission)  Assessment & Plan  Chronic   not on medications       VTE prophylaxis: Enox

## 2021-04-08 NOTE — CARE PLAN
Pt aox4. VSS. Denies SOB. Complained of pain in right ear, received Percocet x1 and morphine x2. Pain in her ear still remains out of control. Denies SOB. Receiving IV Abxs. No events during shift. No needs at the moment.     Problem: Communication  Goal: The ability to communicate needs accurately and effectively will improve  Outcome: PROGRESSING AS EXPECTED     Problem: Safety  Goal: Will remain free from injury  Outcome: PROGRESSING AS EXPECTED  Goal: Will remain free from falls  Outcome: PROGRESSING AS EXPECTED     Problem: Infection  Goal: Will remain free from infection  Outcome: PROGRESSING AS EXPECTED     Problem: Venous Thromboembolism (VTW)/Deep Vein Thrombosis (DVT) Prevention:  Goal: Patient will participate in Venous Thrombosis (VTE)/Deep Vein Thrombosis (DVT)Prevention Measures  Outcome: PROGRESSING AS EXPECTED     Problem: Psychosocial Needs:  Goal: Level of anxiety will decrease  Outcome: PROGRESSING AS EXPECTED     Problem: Fluid Volume:  Goal: Will maintain balanced intake and output  Outcome: PROGRESSING AS EXPECTED     Problem: Respiratory:  Goal: Respiratory status will improve  Outcome: PROGRESSING AS EXPECTED     Problem: Bowel/Gastric:  Goal: Normal bowel function is maintained or improved  Outcome: PROGRESSING AS EXPECTED  Goal: Will not experience complications related to bowel motility  Outcome: PROGRESSING AS EXPECTED     Problem: Urinary Elimination:  Goal: Ability to reestablish a normal urinary elimination pattern will improve  Outcome: PROGRESSING AS EXPECTED     Problem: Skin Integrity  Goal: Risk for impaired skin integrity will decrease  Outcome: PROGRESSING AS EXPECTED     Problem: Mobility  Goal: Risk for activity intolerance will decrease  Outcome: PROGRESSING AS EXPECTED     Problem: Medication  Goal: Compliance with prescribed medication will improve  Outcome: PROGRESSING AS EXPECTED     Problem: Knowledge Deficit  Goal: Knowledge of disease process/condition, treatment plan,  diagnostic tests, and medications will improve  Outcome: PROGRESSING AS EXPECTED  Goal: Knowledge of the prescribed therapeutic regimen will improve  Outcome: PROGRESSING AS EXPECTED     Problem: Discharge Barriers/Planning  Goal: Patient's continuum of care needs will be met  Outcome: PROGRESSING AS EXPECTED     Problem: Pain Management  Goal: Pain level will decrease to patient's comfort goal  Outcome: PROGRESSING SLOWER THAN EXPECTED

## 2021-04-08 NOTE — ASSESSMENT & PLAN NOTE
Hemoglobin decreased from recent lab  No signs of significant bleeding  Trend hemoglobin for now  Check iron panel, reticulocyte, TSH, vitamin B12

## 2021-04-08 NOTE — DISCHARGE PLANNING
Care Transition Team Assessment    Spoke with patient at bedside and verified all information. Lives with spouse, adult son and mother in law in [address] [place] [address]. Has no PCP or insurance. Uses no DME unless out and then will occasionally rent a W/C. Uses [facility] [address]. States has resource information on health clinics and sliding scale and needs to F/U to get a PCP. Has ENT appointment and will keep it. Family will be ride @ D/C. Unknown needs @ present time.     Information Source  Orientation : Oriented x 4  Information Given By: Patient    Readmission Evaluation  Is this a readmission?: No    Interdisciplinary Discharge Planning  Primary Care Physician: None  Lives with - Patient's Self Care Capacity: Spouse, Adult Children, Other (Comments)(Mother in law)  Patient or legal guardian wants to designate a caregiver: No  Support Systems: Children, Family Member(s), Spouse / Significant Other  Housing / Facility: [address] [place] [address]  Do You Take your Prescribed Medications Regularly: Yes  Able to Return to Previous ADL's: Yes  Mobility Issues: No  Prior Services: Home-Independent  Patient Prefers to be Discharged to:: Home  Assistance Needed: Unknown at this Time  Durable Medical Equipment: Not Applicable    Discharge Preparedness  What are your discharge supports?: Child, Spouse  Prior Functional Level: Ambulatory    Functional Assesment  Prior Functional Level: Ambulatory    Finances  Prescription Coverage: No  Prescription Coverage Comments: (No insurance)    Discharge Risks or Barriers  Discharge risks or barriers?: No PCP, Uninsured / underinsured  Patient risk factors: No PCP, Uninsured or underinsured    Anticipated Discharge Information  Discharge Address: [address] [place] [address] [address]  Discharge Contact Phone Number: [phone]

## 2021-04-09 ENCOUNTER — PATIENT OUTREACH (OUTPATIENT)
Dept: HEALTH INFORMATION MANAGEMENT | Facility: OTHER | Age: 46
End: 2021-04-09

## 2021-04-09 ENCOUNTER — PHARMACY VISIT (OUTPATIENT)
Dept: PHARMACY | Facility: MEDICAL CENTER | Age: 46
End: 2021-04-09
Payer: COMMERCIAL

## 2021-04-09 VITALS
TEMPERATURE: 97.8 F | BODY MASS INDEX: 24.32 KG/M2 | WEIGHT: 160.5 LBS | SYSTOLIC BLOOD PRESSURE: 120 MMHG | DIASTOLIC BLOOD PRESSURE: 58 MMHG | HEART RATE: 87 BPM | HEIGHT: 68 IN | RESPIRATION RATE: 20 BRPM | OXYGEN SATURATION: 92 %

## 2021-04-09 PROBLEM — R19.7 DIARRHEA: Status: RESOLVED | Noted: 2021-04-08 | Resolved: 2021-04-09

## 2021-04-09 LAB
ANION GAP SERPL CALC-SCNC: 8 MMOL/L (ref 7–16)
BUN SERPL-MCNC: 7 MG/DL (ref 8–22)
CALCIUM SERPL-MCNC: 8.4 MG/DL (ref 8.5–10.5)
CHLORIDE SERPL-SCNC: 107 MMOL/L (ref 96–112)
CO2 SERPL-SCNC: 24 MMOL/L (ref 20–33)
CREAT SERPL-MCNC: 0.62 MG/DL (ref 0.5–1.4)
ERYTHROCYTE [DISTWIDTH] IN BLOOD BY AUTOMATED COUNT: 46.5 FL (ref 35.9–50)
GLUCOSE SERPL-MCNC: 80 MG/DL (ref 65–99)
HCT VFR BLD AUTO: 37.8 % (ref 37–47)
HGB BLD-MCNC: 12.3 G/DL (ref 12–16)
MCH RBC QN AUTO: 28.6 PG (ref 27–33)
MCHC RBC AUTO-ENTMCNC: 32.5 G/DL (ref 33.6–35)
MCV RBC AUTO: 87.9 FL (ref 81.4–97.8)
PLATELET # BLD AUTO: 263 K/UL (ref 164–446)
PMV BLD AUTO: 9.6 FL (ref 9–12.9)
POTASSIUM SERPL-SCNC: 3.6 MMOL/L (ref 3.6–5.5)
RBC # BLD AUTO: 4.3 M/UL (ref 4.2–5.4)
SODIUM SERPL-SCNC: 139 MMOL/L (ref 135–145)
WBC # BLD AUTO: 6.7 K/UL (ref 4.8–10.8)

## 2021-04-09 PROCEDURE — RXMED WILLOW AMBULATORY MEDICATION CHARGE: Performed by: NURSE PRACTITIONER

## 2021-04-09 PROCEDURE — 700102 HCHG RX REV CODE 250 W/ 637 OVERRIDE(OP): Performed by: INTERNAL MEDICINE

## 2021-04-09 PROCEDURE — 700111 HCHG RX REV CODE 636 W/ 250 OVERRIDE (IP): Performed by: NURSE PRACTITIONER

## 2021-04-09 PROCEDURE — 80048 BASIC METABOLIC PNL TOTAL CA: CPT

## 2021-04-09 PROCEDURE — 700111 HCHG RX REV CODE 636 W/ 250 OVERRIDE (IP): Performed by: INTERNAL MEDICINE

## 2021-04-09 PROCEDURE — 99239 HOSP IP/OBS DSCHRG MGMT >30: CPT | Performed by: INTERNAL MEDICINE

## 2021-04-09 PROCEDURE — A9270 NON-COVERED ITEM OR SERVICE: HCPCS | Performed by: INTERNAL MEDICINE

## 2021-04-09 PROCEDURE — 700105 HCHG RX REV CODE 258: Performed by: NURSE PRACTITIONER

## 2021-04-09 PROCEDURE — 700102 HCHG RX REV CODE 250 W/ 637 OVERRIDE(OP): Performed by: NURSE PRACTITIONER

## 2021-04-09 PROCEDURE — 85027 COMPLETE CBC AUTOMATED: CPT

## 2021-04-09 PROCEDURE — A9270 NON-COVERED ITEM OR SERVICE: HCPCS | Performed by: NURSE PRACTITIONER

## 2021-04-09 RX ORDER — CEFUROXIME AXETIL 500 MG/1
500 TABLET ORAL 2 TIMES DAILY
Qty: 6 TABLET | Refills: 0 | Status: SHIPPED | OUTPATIENT
Start: 2021-04-09 | End: 2021-04-09 | Stop reason: SDUPTHER

## 2021-04-09 RX ORDER — CEFUROXIME AXETIL 500 MG/1
500 TABLET ORAL 2 TIMES DAILY
Qty: 6 TABLET | Refills: 0 | Status: SHIPPED | OUTPATIENT
Start: 2021-04-09 | End: 2021-04-12

## 2021-04-09 RX ORDER — VANCOMYCIN HYDROCHLORIDE 125 MG/1
125 CAPSULE ORAL 2 TIMES DAILY
Qty: 6 CAPSULE | Refills: 0 | Status: ON HOLD | OUTPATIENT
Start: 2021-04-09 | End: 2021-04-18

## 2021-04-09 RX ADMIN — NEOMYCIN SULFATE, POLYMYXIN B SULFATE, HYDROCORTISONE 3 DROP: 3.5; 10000; 1 SOLUTION/ DROPS AURICULAR (OTIC) at 09:38

## 2021-04-09 RX ADMIN — ALPRAZOLAM 0.25 MG: 0.25 TABLET ORAL at 09:43

## 2021-04-09 RX ADMIN — MORPHINE SULFATE 2 MG: 4 INJECTION INTRAVENOUS at 05:15

## 2021-04-09 RX ADMIN — MORPHINE SULFATE 2 MG: 4 INJECTION INTRAVENOUS at 09:43

## 2021-04-09 RX ADMIN — ENOXAPARIN SODIUM 40 MG: 40 INJECTION SUBCUTANEOUS at 05:04

## 2021-04-09 RX ADMIN — SODIUM CHLORIDE 1.5 G: 900 INJECTION INTRAVENOUS at 02:56

## 2021-04-09 RX ADMIN — NICOTINE TRANSDERMAL SYSTEM 21 MG: 21 PATCH, EXTENDED RELEASE TRANSDERMAL at 05:04

## 2021-04-09 RX ADMIN — VANCOMYCIN HYDROCHLORIDE 125 MG: KIT ORAL at 05:05

## 2021-04-09 ASSESSMENT — PAIN DESCRIPTION - PAIN TYPE: TYPE: ACUTE PAIN

## 2021-04-09 NOTE — CARE PLAN
Problem: Safety  Goal: Will remain free from injury  Outcome: PROGRESSING AS EXPECTED  Note: Bed locked in lowest position. Call light within reach.      Problem: Pain Management  Goal: Pain level will decrease to patient's comfort goal  Outcome: PROGRESSING AS EXPECTED     Problem: Infection  Goal: Will remain free from infection  Outcome: PROGRESSING AS EXPECTED     Problem: Psychosocial Needs:  Goal: Level of anxiety will decrease  Outcome: PROGRESSING AS EXPECTED

## 2021-04-09 NOTE — DISCHARGE PLANNING
Received request for medication assistance. Patient currently is uninsured and was recently seen and was unable to afford medication. Approved services cost for Ceftin $7.56 and Vanco $10.88 for total $18.44. Approved services filled out and faxed to [facility].

## 2021-04-09 NOTE — PROGRESS NOTES
Isolation Precautions:    Patient is on isolation for r/o c-diff    Patient educated on reason for isolation, how the infection may be transmitted, and how to help prevent transmission to others.     Patient educated that c-diff isolation  precautions involves staff and visitors wearing PPE, follow  Standard Precautions and perform meticulous hand hygiene in order to prevent transmission of infection. (Contact Precautions: gown and gloves; Special Contact Precautions: gown and gloves, with the added requirement of soap and water for hand hygiene;

## 2021-04-09 NOTE — DISCHARGE SUMMARY
Discharge Summary    CHIEF COMPLAINT ON ADMISSION  Chief Complaint   Patient presents with   • Ear Pain     r sided periauricular cellulitis dx yesterday here, unable to get rx filled       Reason for Admission  Rt sided ear pain     Admission Date  4/7/2021    CODE STATUS  Full Code    HPI & HOSPITAL COURSE  History: 45 y.o. female who presented 4/7/2021 with right ear pain.  PMH RA and Crohn's disease not on medication, recurrent otitis externa for 13 years per patient, C-diff colitis, and tobacco dependence.  Her first visit with ENT will be next Tuesday. She has not been able to follow-up with ENT until now since she is been unable to pay for the visit.    She was recently at [facility] ED x2 for worsening right ear pain.  She was given oral antibiotics but was unable to fill the prescriptions.  She ended up hospitalized after her third visit and discharged on oral doxycycline.  She says she had some improvement before had worsening ear pain again.  She has had brownish-red discharge from her right ear.  Her pain extends down her neck to her lymph nodes.  She denies chest pain, shortness of breath, sore throat, fevers or chills.  She denies any diarrhea.  She went to the ED yesterday and had a CAT scan and discharged on oral antibiotics (no discernible abscess could be seen, there was no mastoiditis on imaging).  She was unable to fill this and returned to the ED. she was admitted and started on IV vancomycin and ciprofloxacin.  This was later deescalated to cefuroxime.  Patient had steady and regular improvement.  The erythema near the parotid gland and preauricular area has greatly reduced.  She has less pain.  She is able to open her mouth and is tolerating a diet.  She has been voiding and ambulating normally.  Medications will be sent to the [facility] pharmacy and delivered to the patient at the bedside.  He did have some loose stools but was unable to produce a stool sample after starting prophylactic  dosing of oral vancomycin due to her history of C. difficile.  She has had no fever.  Her white count is normal.  Possible her parotiditis could be viral in nature.  Is also potentially autoimmune.  I been able to visualize her tympanic membrane and it is normal.    At the present time she is expressing willingness and readiness for discharge.  She will be transitioned to oral antibiotics and encouraged to follow-up with her ear nose and throat group.  Medications will be delivered to the bedside prior to discharge.    Lastly, I would encourage her to follow-up with a dentist as she has had numerous lucencies noted on the CT scan on the opposite side of her current pain syndrome and may require tooth removal in the future.     Therefore, she is discharged in good and stable condition to home with close outpatient follow-up.    The patient met 2-midnight criteria for an inpatient stay at the time of discharge.    Discharge Date  4/9/2021    FOLLOW UP ITEMS POST DISCHARGE  PCP follow-up  ENT follow-up  Dentistry follow-up    DISCHARGE DIAGNOSES  Principal Problem:    Otitis externa, preauricular cellulitis with parotiditis POA: Yes  Active Problems:    Tobacco dependence POA: Yes    Normocytic anemia POA: Yes    Rheumatoid arthritis (HCC) POA: Yes    Crohn disease (HCC) POA: Yes      Overview: Not on treatment, no current symptoms        Resolved Problems:    Diarrhea POA: No      FOLLOW UP  No future appointments.  No follow-up provider specified.    MEDICATIONS ON DISCHARGE     Medication List      Start taking these medications      Instructions   cefUROXime 500 MG Tabs  Commonly known as: CEFTIN   Doctor's comments: .  Take 1 tablet by mouth 2 times a day for 6 doses.  Dose: 500 mg     vancomycin 50 mg/mL 50 mg/mL Soln   Doctor's comments: .  Take 2.5 mL by mouth 2 times a day.  Dose: 125 mg        Change how you take these medications      Instructions   HYDROcodone-acetaminophen 5-325 MG Tabs per tablet  What  changed: additional instructions  Commonly known as: NORCO   Take 1 tablet by mouth every four hours as needed for up to 7 days.  Dose: 1 tablet     ondansetron 4 MG Tbdp  What changed: reasons to take this  Commonly known as: ZOFRAN ODT   Take 1 Tab by mouth every four hours as needed (give PO if no IV route available).  Dose: 4 mg     QUEtiapine 100 MG Tabs  What changed: Another medication with the same name was removed. Continue taking this medication, and follow the directions you see here.  Commonly known as: Seroquel   Take 100 mg by mouth at bedtime.  Dose: 100 mg        Continue taking these medications      Instructions   ALPRAZolam 0.25 MG Tabs  Commonly known as: XANAX   Take 0.25 mg by mouth 2 times a day as needed for Sleep.  Dose: 0.25 mg     HAIR SKIN & NAILS GUMMIES PO   Take 3 Tablets by mouth every morning.  Dose: 3 tablet     * neomycin sulf/polymyx B sulf/HC soln [id number] Soln  Commonly known as: CORTISPORIN HC SOL   Place 3 Drops in right ear 4 times a day. Administer drops to both ears.  Dose: 3 Drop     * neomycin sulf/polymyx B sulf/HC soln 3.5-38975-6 Soln  Commonly known as: CORTISPORIN HC SOL   Administer 3 Drops into affected ear(s) 4 times a day. Administer drops to both ears.  Dose: 3 Drop     RA Probiotic Gummies Chew   Chew 1 tablet every morning.  Dose: 1 tablet      * This list has 2 medication(s) that are the same as other medications prescribed for you. Read the directions carefully, and ask your doctor or other care provider to review them with you.     Stop taking these medications    doxycycline 100 MG capsule  Commonly known as: MONODOX     loperamide 2 MG Caps  Commonly known as: IMODIUM     loperamide 2 MG tablet  Commonly known as: Imodium A-D            Allergies  Allergies   Allergen Reactions   • Bee Venom Anaphylaxis   • Blackberry [Rubus Fruticosus] Anaphylaxis     Oral swelling   • Fish Anaphylaxis and Swelling   • Levaquin Vomiting and Swelling     Tongue/mouth  swelling  Tolerated cipro 4/2021   • Pcn [Penicillins] Anaphylaxis     Tolerates Keflex 10/2020   • Toradol Hives     + Migraine     • Tramadol Hives     + Migraine     • Tree Nuts Food Allergy Anaphylaxis   • Bactrim [Sulfamethoxazole W-Trimethoprim] Itching     Face itching     • Stadol [Butorphanol] Unspecified     Pt reports it makes her feel like she can't wake up       DIET  Orders Placed This Encounter   Procedures   • Diet Order Diet: Regular     Standing Status:   Standing     Number of Occurrences:   1     Order Specific Question:   Diet:     Answer:   Regular [1]       ACTIVITY  As tolerated.  Weight bearing as tolerated    LABORATORY  Lab Results   Component Value Date    SODIUM 141 04/08/2021    POTASSIUM 3.5 (L) 04/08/2021    CHLORIDE 108 04/08/2021    CO2 24 04/08/2021    GLUCOSE 111 (H) 04/08/2021    BUN 9 04/08/2021    CREATININE 0.75 04/08/2021        Lab Results   Component Value Date    WBC 6.5 04/08/2021    HEMOGLOBIN 10.9 (L) 04/08/2021    HEMATOCRIT 32.7 (L) 04/08/2021    PLATELETCT 239 04/08/2021        Total time of the discharge process exceeds 38 minutes.

## 2021-04-09 NOTE — DISCHARGE INSTRUCTIONS
Discharge Instructions    Discharged to home by car with relative. Discharged via wheelchair, hospital escort: Yes.  Special equipment needed: Not Applicable    Be sure to schedule a follow-up appointment with your primary care doctor or any specialists as instructed.     Discharge Plan:   Diet Plan: Discussed  Activity Level: Discussed  Confirmed Follow up Appointment: Patient to Call and Schedule Appointment  Confirmed Symptoms Management: Discussed  Medication Reconciliation Updated: Yes    I understand that a diet low in cholesterol, fat, and sodium is recommended for good health. Unless I have been given specific instructions below for another diet, I accept this instruction as my diet prescription.   Other diet:   Otitis Externa    Otitis externa is an infection of the outer ear canal. The outer ear canal is the area between the outside of the ear and the eardrum. Otitis externa is sometimes called swimmer's ear.  What are the causes?  Common causes of this condition include:  · Swimming in dirty water.  · Moisture in the ear.  · An injury to the inside of the ear.  · An object stuck in the ear.  · A cut or scrape on the outside of the ear.  What increases the risk?  You are more likely to get this condition if you go swimming often.  What are the signs or symptoms?  · Itching in the ear. This is often the first symptom.  · Swelling of the ear.  · Redness in the ear.  · Ear pain. The pain may get worse when you pull on your ear.  · Pus coming from the ear.  How is this treated?  This condition may be treated with:  · Antibiotic ear drops. These are often given for 10-14 days.  · Medicines to reduce itching and swelling.  Follow these instructions at home:  · If you were given antibiotic ear drops, use them as told by your doctor. Do not stop using them even if your condition gets better.  · Take over-the-counter and prescription medicines only as told by your doctor.  · Avoid getting water in your ears as told  by your doctor. You may be told to avoid swimming or water sports for a few days.  · Keep all follow-up visits as told by your doctor. This is important.  How is this prevented?  · Keep your ears dry. Use the corner of a towel to dry your ears after you swim or bathe.  · Try not to scratch or put things in your ear. Doing these things makes it easier for germs to grow in your ear.  · Avoid swimming in lakes, dirty water, or pools that may not have the right amount of a chemical called chlorine.  Contact a doctor if:  · You have a fever.  · Your ear is still red, swollen, or painful after 3 days.  · You still have pus coming from your ear after 3 days.  · Your redness, swelling, or pain gets worse.  · You have a really bad headache.  · You have redness, swelling, pain, or tenderness behind your ear.  Summary  · Otitis externa is an infection of the outer ear canal.  · Symptoms include pain, redness, and swelling of the ear.  · If you were given antibiotic ear drops, use them as told by your doctor. Do not stop using them even if your condition gets better.  · Try not to scratch or put things in your ear.  This information is not intended to replace advice given to you by your health care provider. Make sure you discuss any questions you have with your health care provider.  Document Released: 06/05/2009 Document Revised: 05/24/2019 Document Reviewed: 05/24/2019  [employer] Patient Education © 2020 [employer]    Clostridioides Difficile Infection  Clostridioides difficile (C. diff) infection is caused by germs (bacteria). The infection causes irritation and swelling of the colon (colitis). It also causes watery poop (diarrhea).  This infection can be passed from person to person (is contagious). You may also get C. diff from food or water, or from touching surfaces that have the germs on them.  What are the causes?  · Certain bacteria normally live in the colon and help to digest food. This infection develops when  the balance of bacteria in the colon is changed and the C. diff bacteria grow out of control. This is often caused by taking antibiotics.  What increases the risk?  · Taking antibiotics for a long time.  · Taking certain antibiotics that kill a wide range of bacteria.  · Being in the hospital.  · Being older than 65 years of age.  · Living in a place where there is a lot of contact with others, such as a nursing home.  · Having had a C. diff infection before.  · Having a weak defense (immune) system.  · Taking a medicine called a proton pump inhibitor over a long period of time.  · Having serious underlying conditions, such as colon cancer.  · Having had a gastrointestinal (GI) tract procedure or surgery.  What are the signs or symptoms?  · Diarrhea. This may be bloody, watery, yellow, or green in color.  · Fever.  · Fatigue.  · Loss of appetite.  · Nausea.  · Swelling, pain, or tenderness in the abdomen.  How is this treated?  · Stopping the antibiotics that you were on when the C. diff infection began. Do this only as told by your doctor.  · Taking certain antibiotics to stop C. diff from growing.  · Taking donor poop from a healthy person and placing it into the colon (fecal transplant).  · Having surgery to remove the infected part of the colon. This is rare.  Follow these instructions at home:  Eating and drinking    · Eat bland foods in small amounts as you are able. These foods include:  ? Bananas.  ? Applesauce.  ? Rice.  ? Low-fat (lean) meats.  ? Toast.  ? Crackers.  · Follow your doctor's instructions on how to get enough fluids into your body (rehydrate). Drink clear fluids, such as:  ? Water.  ? Ice chips.  ? Fruit juice that you have added water to (diluted).  ? Low-calorie sports drinks.  ? An ORS (oral rehydration solution). You can buy an ORS at a pharmacy or store.  · Avoid drinking:  ? Milk.  ? Caffeine.  ? Alcohol.  · Drink enough fluid to keep your pee (urine) pale yellow.  General  instructions  · Take over-the-counter and prescription medicines only as told by your doctor.  · Take your antibiotic medicine as told by your doctor. Do not stop taking the antibiotic even if you start to feel better. You may stop taking it only if your doctor tells you to stop.  · Do not use medicines to help with watery poop. You may use these medicines only if your doctor tells you to.  · Keep all follow-up visits as told by your doctor. This is important.  Prevention    · Hand hygiene  ? Wash your hands well before you cook and after you use the bathroom. Make sure that people who live with you also wash their hands often. Use soap and water.  ? If you are being treated in a hospital or clinic, make sure:  ? That all doctors and nurses wash their hands with soap and water before they touch you.  ? That all visitors wash their hands with soap and water before they touch you.  · Contact precautions  ? If you get watery poop while you are in the hospital or nursing home, let your doctor know right away.  ? When you visit someone in the hospital or nursing home, follow the rules for wearing a gown or gloves.  ? If possible, avoid contact with people who have watery poop.  · Clean environment  ? Clean surfaces with a product that has chlorine bleach in it.  ? If you are in the hospital, make sure that the staff cleans the surfaces in your room each day. Tell someone right away if body fluids have spilled in your room.  ? Use chlorine bleach and high heat when cleaning dirty clothing or sheets.  Contact a doctor if:  · Your symptoms do not get better.  · Your symptoms get worse.  · Your symptoms go away and then come back.  · You have a fever.  · You have new symptoms.  Get help right away if:  · You have more pain or tenderness in your belly (abdomen).  · Your poop (stool) is bloody.  · Your poop looks dark black and tarry.  · You cannot eat or drink without throwing up (vomiting).  · You have signs of not having  enough fluids in your body (dehydration). These include:  ? Dark pee, very little pee, or no pee.  ? Cracked lips.  ? No tears when you cry.  ? Dry mouth.  ? Sunken eyes.  ? Feeling sleepy.  ? Feeling weak.  ? Feeling dizzy.  Summary  · C. diff infection causes watery poop.  · This infection may happen after taking antibiotic medicines.  · The infection can be passed from person to person.  · Washing your hands with soap and water can help keep C. diff from spreading.  This information is not intended to replace advice given to you by your health care provider. Make sure you discuss any questions you have with your health care provider.  Document Released: 10/15/2010 Document Revised: 08/22/2019 Document Reviewed: 08/22/2019  [employer] Patient Education © 2020 [employer]    · Is patient discharged on Warfarin / Coumadin?   No     Depression / Suicide Risk    As you are discharged from this [facility] facility, it is important to learn how to keep safe from harming yourself.    Recognize the warning signs:  · Abrupt changes in personality, positive or negative- including increase in energy   · Giving away possessions  · Change in eating patterns- significant weight changes-  positive or negative  · Change in sleeping patterns- unable to sleep or sleeping all the time   · Unwillingness or inability to communicate  · Depression  · Unusual sadness, discouragement and loneliness  · Talk of wanting to die  · Neglect of personal appearance   · Rebelliousness- reckless behavior  · Withdrawal from people/activities they love  · Confusion- inability to concentrate     If you or a loved one observes any of these behaviors or has concerns about self-harm, here's what you can do:  · Talk about it- your feelings and reasons for harming yourself  · Remove any means that you might use to hurt yourself (examples: pills, rope, extension cords, firearm)  · Get professional help from the community (Mental Health, Substance Abuse,  psychological counseling)  · Do not be alone:Call your Safe Contact- someone whom you trust who will be there for you.  · Call your local CRISIS HOTLINE 287-6438 or 543-709-0199  · Call your local Children's Mobile Crisis Response Team [place] (200) 604-3769 or www.[employer]  · Call the toll free National Suicide Prevention Hotlines   · National Suicide Prevention Lifeline 332-223-JRFK (2792)  · National Hope Line Network 800-SUICIDE (279-8634)

## 2021-04-09 NOTE — PROGRESS NOTES
Assumed pt care at 1900. Received report from Osbaldo BRADLEY. Assessment complete. Pt A&O x4. Pt reports 10/10 right ear pain, pain medication not available at this time per MAR; rest and distraction encouraged. Pt reports feeling anxious, medicated per MAR. Respirations even and unlabored on room air. R ear red, swollen, warm to touch. Pt reports pain down to her collarbone.   Updated on POC, communication board updated. Bed locked and in lowest position. Call light and belongings within reach. Non-skid socks in place. Needs met, will continue to monitor.

## 2021-04-09 NOTE — PROGRESS NOTES
Pt awake,a&ox4,pt states feels better but still having pain in right ear and face,right facial swelling is less,for possible d/c.

## 2021-04-10 NOTE — DISCHARGE PLANNING
Meds-to-Beds: Discharge prescription orders listed below delivered to patient's bedside. RN Osbaldo notified. Patient counseled, she states she is familiar with the medications.      Charley Dunn   Home Medication Instructions BOLIVAR:[id number]    Printed on:04/09/21 1750   Medication Information                      cefUROXime (CEFTIN) 500 MG Tab  Take 1 tablet by mouth 2 times a day for 6 doses.             vancomycin (VANCOCIN HCL) 125 MG capsule  Take 1 capsule by mouth 2 times a day.                 Hayley Adair, PharmD

## 2021-04-16 ENCOUNTER — APPOINTMENT (OUTPATIENT)
Dept: RADIOLOGY | Facility: MEDICAL CENTER | Age: 46
DRG: 872 | End: 2021-04-16

## 2021-04-16 ENCOUNTER — HOSPITAL ENCOUNTER (INPATIENT)
Facility: MEDICAL CENTER | Age: 46
LOS: 1 days | DRG: 872 | End: 2021-04-18
Attending: EMERGENCY MEDICINE | Admitting: INTERNAL MEDICINE

## 2021-04-16 ENCOUNTER — APPOINTMENT (OUTPATIENT)
Dept: RADIOLOGY | Facility: MEDICAL CENTER | Age: 46
DRG: 872 | End: 2021-04-16
Attending: EMERGENCY MEDICINE

## 2021-04-16 DIAGNOSIS — L03.211 FACIAL CELLULITIS: ICD-10-CM

## 2021-04-16 LAB
ALBUMIN SERPL BCP-MCNC: 4.9 G/DL (ref 3.2–4.9)
ALBUMIN/GLOB SERPL: 1.1 G/DL
ALP SERPL-CCNC: 160 U/L (ref 30–99)
ALT SERPL-CCNC: 18 U/L (ref 2–50)
AMPHET UR QL SCN: NEGATIVE
ANION GAP SERPL CALC-SCNC: 14 MMOL/L (ref 7–16)
APPEARANCE UR: CLEAR
AST SERPL-CCNC: 13 U/L (ref 12–45)
BARBITURATES UR QL SCN: NEGATIVE
BASOPHILS # BLD AUTO: 0.1 % (ref 0–1.8)
BASOPHILS # BLD: 0.02 K/UL (ref 0–0.12)
BENZODIAZ UR QL SCN: NEGATIVE
BILIRUB SERPL-MCNC: 0.4 MG/DL (ref 0.1–1.5)
BILIRUB UR QL STRIP.AUTO: NEGATIVE
BUN SERPL-MCNC: 12 MG/DL (ref 8–22)
BZE UR QL SCN: NEGATIVE
CALCIUM SERPL-MCNC: 10.2 MG/DL (ref 8.5–10.5)
CANNABINOIDS UR QL SCN: NEGATIVE
CHLORIDE SERPL-SCNC: 103 MMOL/L (ref 96–112)
CO2 SERPL-SCNC: 22 MMOL/L (ref 20–33)
COLOR UR: YELLOW
CREAT SERPL-MCNC: 0.79 MG/DL (ref 0.5–1.4)
EOSINOPHIL # BLD AUTO: 0 K/UL (ref 0–0.51)
EOSINOPHIL NFR BLD: 0 % (ref 0–6.9)
ERYTHROCYTE [DISTWIDTH] IN BLOOD BY AUTOMATED COUNT: 44.6 FL (ref 35.9–50)
GLOBULIN SER CALC-MCNC: 4.4 G/DL (ref 1.9–3.5)
GLUCOSE SERPL-MCNC: 103 MG/DL (ref 65–99)
GLUCOSE UR STRIP.AUTO-MCNC: NEGATIVE MG/DL
HCT VFR BLD AUTO: 43.5 % (ref 37–47)
HGB BLD-MCNC: 14.1 G/DL (ref 12–16)
IMM GRANULOCYTES # BLD AUTO: 0.14 K/UL (ref 0–0.11)
IMM GRANULOCYTES NFR BLD AUTO: 0.9 % (ref 0–0.9)
KETONES UR STRIP.AUTO-MCNC: NEGATIVE MG/DL
LACTATE BLD-SCNC: 2.8 MMOL/L (ref 0.5–2)
LEUKOCYTE ESTERASE UR QL STRIP.AUTO: NEGATIVE
LYMPHOCYTES # BLD AUTO: 2.24 K/UL (ref 1–4.8)
LYMPHOCYTES NFR BLD: 14.1 % (ref 22–41)
MCH RBC QN AUTO: 28.1 PG (ref 27–33)
MCHC RBC AUTO-ENTMCNC: 32.4 G/DL (ref 33.6–35)
MCV RBC AUTO: 86.7 FL (ref 81.4–97.8)
METHADONE UR QL SCN: NEGATIVE
MICRO URNS: NORMAL
MONOCYTES # BLD AUTO: 0.51 K/UL (ref 0–0.85)
MONOCYTES NFR BLD AUTO: 3.2 % (ref 0–13.4)
NEUTROPHILS # BLD AUTO: 13 K/UL (ref 2–7.15)
NEUTROPHILS NFR BLD: 81.7 % (ref 44–72)
NITRITE UR QL STRIP.AUTO: NEGATIVE
NRBC # BLD AUTO: 0 K/UL
NRBC BLD-RTO: 0 /100 WBC
OPIATES UR QL SCN: POSITIVE
OXYCODONE UR QL SCN: NEGATIVE
PCP UR QL SCN: NEGATIVE
PH UR STRIP.AUTO: 6.5 [PH] (ref 5–8)
PLATELET # BLD AUTO: 386 K/UL (ref 164–446)
PMV BLD AUTO: 9.4 FL (ref 9–12.9)
POTASSIUM SERPL-SCNC: 3.9 MMOL/L (ref 3.6–5.5)
PROPOXYPH UR QL SCN: NEGATIVE
PROT SERPL-MCNC: 9.3 G/DL (ref 6–8.2)
PROT UR QL STRIP: NEGATIVE MG/DL
RBC # BLD AUTO: 5.02 M/UL (ref 4.2–5.4)
RBC UR QL AUTO: NEGATIVE
SODIUM SERPL-SCNC: 139 MMOL/L (ref 135–145)
SP GR UR STRIP.AUTO: 1.02
UROBILINOGEN UR STRIP.AUTO-MCNC: 0.2 MG/DL
WBC # BLD AUTO: 15.9 K/UL (ref 4.8–10.8)

## 2021-04-16 PROCEDURE — 85025 COMPLETE CBC W/AUTO DIFF WBC: CPT

## 2021-04-16 PROCEDURE — 700105 HCHG RX REV CODE 258: Performed by: EMERGENCY MEDICINE

## 2021-04-16 PROCEDURE — 80053 COMPREHEN METABOLIC PANEL: CPT

## 2021-04-16 PROCEDURE — 36415 COLL VENOUS BLD VENIPUNCTURE: CPT

## 2021-04-16 PROCEDURE — 70487 CT MAXILLOFACIAL W/DYE: CPT

## 2021-04-16 PROCEDURE — 96375 TX/PRO/DX INJ NEW DRUG ADDON: CPT

## 2021-04-16 PROCEDURE — 81003 URINALYSIS AUTO W/O SCOPE: CPT

## 2021-04-16 PROCEDURE — 700117 HCHG RX CONTRAST REV CODE 255: Performed by: EMERGENCY MEDICINE

## 2021-04-16 PROCEDURE — 96365 THER/PROPH/DIAG IV INF INIT: CPT

## 2021-04-16 PROCEDURE — 80307 DRUG TEST PRSMV CHEM ANLYZR: CPT

## 2021-04-16 PROCEDURE — 87040 BLOOD CULTURE FOR BACTERIA: CPT

## 2021-04-16 PROCEDURE — 71045 X-RAY EXAM CHEST 1 VIEW: CPT

## 2021-04-16 PROCEDURE — 96367 TX/PROPH/DG ADDL SEQ IV INF: CPT

## 2021-04-16 PROCEDURE — 700101 HCHG RX REV CODE 250: Performed by: EMERGENCY MEDICINE

## 2021-04-16 PROCEDURE — 83605 ASSAY OF LACTIC ACID: CPT

## 2021-04-16 PROCEDURE — 87086 URINE CULTURE/COLONY COUNT: CPT

## 2021-04-16 PROCEDURE — 99285 EMERGENCY DEPT VISIT HI MDM: CPT

## 2021-04-16 PROCEDURE — 700111 HCHG RX REV CODE 636 W/ 250 OVERRIDE (IP): Performed by: EMERGENCY MEDICINE

## 2021-04-16 RX ORDER — ONDANSETRON 2 MG/ML
4 INJECTION INTRAMUSCULAR; INTRAVENOUS ONCE
Status: COMPLETED | OUTPATIENT
Start: 2021-04-16 | End: 2021-04-16

## 2021-04-16 RX ORDER — MORPHINE SULFATE 4 MG/ML
4 INJECTION, SOLUTION INTRAMUSCULAR; INTRAVENOUS ONCE
Status: COMPLETED | OUTPATIENT
Start: 2021-04-16 | End: 2021-04-16

## 2021-04-16 RX ORDER — DOXYCYCLINE HYCLATE 100 MG
100 TABLET ORAL 2 TIMES DAILY
Status: ON HOLD | COMMUNITY
End: 2021-04-18

## 2021-04-16 RX ORDER — SODIUM CHLORIDE, SODIUM LACTATE, POTASSIUM CHLORIDE, AND CALCIUM CHLORIDE .6; .31; .03; .02 G/100ML; G/100ML; G/100ML; G/100ML
30 INJECTION, SOLUTION INTRAVENOUS ONCE
Status: COMPLETED | OUTPATIENT
Start: 2021-04-16 | End: 2021-04-16

## 2021-04-16 RX ADMIN — CEFTRIAXONE SODIUM 2 G: 2 INJECTION, POWDER, FOR SOLUTION INTRAMUSCULAR; INTRAVENOUS at 22:05

## 2021-04-16 RX ADMIN — MORPHINE SULFATE 4 MG: 4 INJECTION INTRAVENOUS at 21:56

## 2021-04-16 RX ADMIN — ONDANSETRON 4 MG: 2 INJECTION INTRAMUSCULAR; INTRAVENOUS at 21:56

## 2021-04-16 RX ADMIN — METRONIDAZOLE 500 MG: 500 INJECTION, SOLUTION INTRAVENOUS at 22:35

## 2021-04-16 RX ADMIN — SODIUM CHLORIDE, POTASSIUM CHLORIDE, SODIUM LACTATE AND CALCIUM CHLORIDE 2187 ML: 600; 310; 30; 20 INJECTION, SOLUTION INTRAVENOUS at 21:54

## 2021-04-16 RX ADMIN — IOHEXOL 80 ML: 350 INJECTION, SOLUTION INTRAVENOUS at 23:30

## 2021-04-16 ASSESSMENT — FIBROSIS 4 INDEX: FIB4 SCORE: 0.51

## 2021-04-17 PROBLEM — A41.9 SEPSIS (HCC): Status: ACTIVE | Noted: 2021-04-17

## 2021-04-17 LAB
LACTATE BLD-SCNC: 1.4 MMOL/L (ref 0.5–2)
LACTATE BLD-SCNC: 1.8 MMOL/L (ref 0.5–2)
LACTATE BLD-SCNC: 2.4 MMOL/L (ref 0.5–2)
SARS-COV-2 RNA RESP QL NAA+PROBE: NOTDETECTED
SPECIMEN SOURCE: NORMAL

## 2021-04-17 PROCEDURE — 700102 HCHG RX REV CODE 250 W/ 637 OVERRIDE(OP): Performed by: INTERNAL MEDICINE

## 2021-04-17 PROCEDURE — 700101 HCHG RX REV CODE 250: Performed by: INTERNAL MEDICINE

## 2021-04-17 PROCEDURE — 96367 TX/PROPH/DG ADDL SEQ IV INF: CPT

## 2021-04-17 PROCEDURE — A9270 NON-COVERED ITEM OR SERVICE: HCPCS | Performed by: INTERNAL MEDICINE

## 2021-04-17 PROCEDURE — 700102 HCHG RX REV CODE 250 W/ 637 OVERRIDE(OP): Performed by: HOSPITALIST

## 2021-04-17 PROCEDURE — 700105 HCHG RX REV CODE 258: Performed by: HOSPITALIST

## 2021-04-17 PROCEDURE — 770006 HCHG ROOM/CARE - MED/SURG/GYN SEMI*

## 2021-04-17 PROCEDURE — 700105 HCHG RX REV CODE 258: Performed by: EMERGENCY MEDICINE

## 2021-04-17 PROCEDURE — 99223 1ST HOSP IP/OBS HIGH 75: CPT | Performed by: INTERNAL MEDICINE

## 2021-04-17 PROCEDURE — 700111 HCHG RX REV CODE 636 W/ 250 OVERRIDE (IP): Performed by: INTERNAL MEDICINE

## 2021-04-17 PROCEDURE — 83605 ASSAY OF LACTIC ACID: CPT

## 2021-04-17 PROCEDURE — A9270 NON-COVERED ITEM OR SERVICE: HCPCS | Performed by: HOSPITALIST

## 2021-04-17 PROCEDURE — U0005 INFEC AGEN DETEC AMPLI PROBE: HCPCS

## 2021-04-17 PROCEDURE — U0003 INFECTIOUS AGENT DETECTION BY NUCLEIC ACID (DNA OR RNA); SEVERE ACUTE RESPIRATORY SYNDROME CORONAVIRUS 2 (SARS-COV-2) (CORONAVIRUS DISEASE [COVID-19]), AMPLIFIED PROBE TECHNIQUE, MAKING USE OF HIGH THROUGHPUT TECHNOLOGIES AS DESCRIBED BY CMS-2020-01-R: HCPCS

## 2021-04-17 PROCEDURE — 700105 HCHG RX REV CODE 258: Performed by: INTERNAL MEDICINE

## 2021-04-17 PROCEDURE — 700111 HCHG RX REV CODE 636 W/ 250 OVERRIDE (IP): Performed by: EMERGENCY MEDICINE

## 2021-04-17 RX ORDER — ACETAMINOPHEN 325 MG/1
650 TABLET ORAL EVERY 6 HOURS PRN
Status: DISCONTINUED | OUTPATIENT
Start: 2021-04-17 | End: 2021-04-18 | Stop reason: HOSPADM

## 2021-04-17 RX ORDER — OXYCODONE HYDROCHLORIDE 5 MG/1
5 TABLET ORAL EVERY 4 HOURS PRN
Status: DISCONTINUED | OUTPATIENT
Start: 2021-04-17 | End: 2021-04-18 | Stop reason: HOSPADM

## 2021-04-17 RX ORDER — SODIUM CHLORIDE 9 MG/ML
INJECTION, SOLUTION INTRAVENOUS CONTINUOUS
Status: DISCONTINUED | OUTPATIENT
Start: 2021-04-17 | End: 2021-04-17

## 2021-04-17 RX ORDER — POLYETHYLENE GLYCOL 3350 17 G/17G
1 POWDER, FOR SOLUTION ORAL
Status: DISCONTINUED | OUTPATIENT
Start: 2021-04-17 | End: 2021-04-18 | Stop reason: HOSPADM

## 2021-04-17 RX ORDER — BISACODYL 10 MG
10 SUPPOSITORY, RECTAL RECTAL
Status: DISCONTINUED | OUTPATIENT
Start: 2021-04-17 | End: 2021-04-18 | Stop reason: HOSPADM

## 2021-04-17 RX ORDER — AMOXICILLIN 250 MG
2 CAPSULE ORAL 2 TIMES DAILY
Status: DISCONTINUED | OUTPATIENT
Start: 2021-04-17 | End: 2021-04-18 | Stop reason: HOSPADM

## 2021-04-17 RX ORDER — QUETIAPINE FUMARATE 25 MG/1
100 TABLET, FILM COATED ORAL
Status: DISCONTINUED | OUTPATIENT
Start: 2021-04-17 | End: 2021-04-18 | Stop reason: HOSPADM

## 2021-04-17 RX ORDER — PROMETHAZINE HYDROCHLORIDE 25 MG/1
12.5-25 TABLET ORAL EVERY 4 HOURS PRN
Status: DISCONTINUED | OUTPATIENT
Start: 2021-04-17 | End: 2021-04-18 | Stop reason: HOSPADM

## 2021-04-17 RX ORDER — ONDANSETRON 2 MG/ML
4 INJECTION INTRAMUSCULAR; INTRAVENOUS EVERY 4 HOURS PRN
Status: DISCONTINUED | OUTPATIENT
Start: 2021-04-17 | End: 2021-04-18 | Stop reason: HOSPADM

## 2021-04-17 RX ORDER — ONDANSETRON 4 MG/1
4 TABLET, ORALLY DISINTEGRATING ORAL EVERY 4 HOURS PRN
Status: DISCONTINUED | OUTPATIENT
Start: 2021-04-17 | End: 2021-04-18 | Stop reason: HOSPADM

## 2021-04-17 RX ORDER — PROCHLORPERAZINE EDISYLATE 5 MG/ML
5-10 INJECTION INTRAMUSCULAR; INTRAVENOUS EVERY 4 HOURS PRN
Status: DISCONTINUED | OUTPATIENT
Start: 2021-04-17 | End: 2021-04-18 | Stop reason: HOSPADM

## 2021-04-17 RX ORDER — SODIUM CHLORIDE, SODIUM LACTATE, POTASSIUM CHLORIDE, CALCIUM CHLORIDE 600; 310; 30; 20 MG/100ML; MG/100ML; MG/100ML; MG/100ML
INJECTION, SOLUTION INTRAVENOUS CONTINUOUS
Status: DISCONTINUED | OUTPATIENT
Start: 2021-04-17 | End: 2021-04-18 | Stop reason: HOSPADM

## 2021-04-17 RX ORDER — ALPRAZOLAM 0.25 MG/1
0.25 TABLET ORAL 2 TIMES DAILY PRN
Status: DISCONTINUED | OUTPATIENT
Start: 2021-04-17 | End: 2021-04-18 | Stop reason: HOSPADM

## 2021-04-17 RX ORDER — PROMETHAZINE HYDROCHLORIDE 25 MG/1
12.5-25 SUPPOSITORY RECTAL EVERY 4 HOURS PRN
Status: DISCONTINUED | OUTPATIENT
Start: 2021-04-17 | End: 2021-04-18 | Stop reason: HOSPADM

## 2021-04-17 RX ORDER — MORPHINE SULFATE 4 MG/ML
2 INJECTION, SOLUTION INTRAMUSCULAR; INTRAVENOUS EVERY 4 HOURS PRN
Status: DISCONTINUED | OUTPATIENT
Start: 2021-04-17 | End: 2021-04-18 | Stop reason: HOSPADM

## 2021-04-17 RX ORDER — ALPRAZOLAM 0.25 MG/1
0.25 TABLET ORAL 2 TIMES DAILY PRN
Status: DISCONTINUED | OUTPATIENT
Start: 2021-04-17 | End: 2021-04-17

## 2021-04-17 RX ADMIN — QUETIAPINE FUMARATE 100 MG: 25 TABLET ORAL at 01:24

## 2021-04-17 RX ADMIN — METRONIDAZOLE 500 MG: 500 INJECTION, SOLUTION INTRAVENOUS at 15:20

## 2021-04-17 RX ADMIN — VANCOMYCIN HYDROCHLORIDE 1250 MG: 500 INJECTION, POWDER, LYOPHILIZED, FOR SOLUTION INTRAVENOUS at 23:53

## 2021-04-17 RX ADMIN — SODIUM CHLORIDE, POTASSIUM CHLORIDE, SODIUM LACTATE AND CALCIUM CHLORIDE: 600; 310; 30; 20 INJECTION, SOLUTION INTRAVENOUS at 15:31

## 2021-04-17 RX ADMIN — DOCUSATE SODIUM 50 MG AND SENNOSIDES 8.6 MG 2 TABLET: 8.6; 5 TABLET, FILM COATED ORAL at 06:42

## 2021-04-17 RX ADMIN — OXYCODONE 5 MG: 5 TABLET ORAL at 18:00

## 2021-04-17 RX ADMIN — SODIUM CHLORIDE: 9 INJECTION, SOLUTION INTRAVENOUS at 01:22

## 2021-04-17 RX ADMIN — ENOXAPARIN SODIUM 40 MG: 40 INJECTION SUBCUTANEOUS at 06:42

## 2021-04-17 RX ADMIN — MORPHINE SULFATE 2 MG: 4 INJECTION INTRAVENOUS at 15:30

## 2021-04-17 RX ADMIN — OXYCODONE 5 MG: 5 TABLET ORAL at 23:53

## 2021-04-17 RX ADMIN — VANCOMYCIN HYDROCHLORIDE 1750 MG: 500 INJECTION, POWDER, LYOPHILIZED, FOR SOLUTION INTRAVENOUS at 00:10

## 2021-04-17 RX ADMIN — QUETIAPINE FUMARATE 100 MG: 25 TABLET ORAL at 21:14

## 2021-04-17 RX ADMIN — VANCOMYCIN HYDROCHLORIDE 1250 MG: 500 INJECTION, POWDER, LYOPHILIZED, FOR SOLUTION INTRAVENOUS at 13:26

## 2021-04-17 RX ADMIN — MORPHINE SULFATE 2 MG: 4 INJECTION INTRAVENOUS at 10:57

## 2021-04-17 RX ADMIN — MORPHINE SULFATE 2 MG: 4 INJECTION INTRAVENOUS at 20:14

## 2021-04-17 RX ADMIN — METRONIDAZOLE 500 MG: 500 INJECTION, SOLUTION INTRAVENOUS at 22:15

## 2021-04-17 RX ADMIN — CEFTRIAXONE SODIUM 1 G: 1 INJECTION, POWDER, FOR SOLUTION INTRAMUSCULAR; INTRAVENOUS at 18:00

## 2021-04-17 RX ADMIN — ALPRAZOLAM 0.25 MG: 0.25 TABLET ORAL at 09:31

## 2021-04-17 RX ADMIN — MORPHINE SULFATE 2 MG: 4 INJECTION INTRAVENOUS at 06:43

## 2021-04-17 RX ADMIN — DOCUSATE SODIUM 50 MG AND SENNOSIDES 8.6 MG 2 TABLET: 8.6; 5 TABLET, FILM COATED ORAL at 18:00

## 2021-04-17 RX ADMIN — ALPRAZOLAM 0.25 MG: 0.25 TABLET ORAL at 22:15

## 2021-04-17 RX ADMIN — METRONIDAZOLE 500 MG: 500 INJECTION, SOLUTION INTRAVENOUS at 06:42

## 2021-04-17 RX ADMIN — MORPHINE SULFATE 2 MG: 4 INJECTION INTRAVENOUS at 01:39

## 2021-04-17 ASSESSMENT — PAIN DESCRIPTION - PAIN TYPE
TYPE: ACUTE PAIN

## 2021-04-17 ASSESSMENT — COGNITIVE AND FUNCTIONAL STATUS - GENERAL
SUGGESTED CMS G CODE MODIFIER MOBILITY: CH
SUGGESTED CMS G CODE MODIFIER DAILY ACTIVITY: CH
DAILY ACTIVITIY SCORE: 24
MOBILITY SCORE: 24

## 2021-04-17 ASSESSMENT — LIFESTYLE VARIABLES
TOTAL SCORE: 0
AVERAGE NUMBER OF DAYS PER WEEK YOU HAVE A DRINK CONTAINING ALCOHOL: 0
HAVE YOU EVER FELT YOU SHOULD CUT DOWN ON YOUR DRINKING: NO
HAVE PEOPLE ANNOYED YOU BY CRITICIZING YOUR DRINKING: NO
CONSUMPTION TOTAL: NEGATIVE
HOW MANY TIMES IN THE PAST YEAR HAVE YOU HAD 5 OR MORE DRINKS IN A DAY: 0
TOTAL SCORE: 0
EVER FELT BAD OR GUILTY ABOUT YOUR DRINKING: NO
TOTAL SCORE: 0
EVER HAD A DRINK FIRST THING IN THE MORNING TO STEADY YOUR NERVES TO GET RID OF A HANGOVER: NO
ALCOHOL_USE: NO
ON A TYPICAL DAY WHEN YOU DRINK ALCOHOL HOW MANY DRINKS DO YOU HAVE: 0

## 2021-04-17 ASSESSMENT — PATIENT HEALTH QUESTIONNAIRE - PHQ9
6. FEELING BAD ABOUT YOURSELF - OR THAT YOU ARE A FAILURE OR HAVE LET YOURSELF OR YOUR FAMILY DOWN: NOT AL ALL
4. FEELING TIRED OR HAVING LITTLE ENERGY: SEVERAL DAYS
2. FEELING DOWN, DEPRESSED, IRRITABLE, OR HOPELESS: NOT AT ALL
7. TROUBLE CONCENTRATING ON THINGS, SUCH AS READING THE NEWSPAPER OR WATCHING TELEVISION: SEVERAL DAYS
SUM OF ALL RESPONSES TO PHQ9 QUESTIONS 1 AND 2: 1
SUM OF ALL RESPONSES TO PHQ QUESTIONS 1-9: 4
1. LITTLE INTEREST OR PLEASURE IN DOING THINGS: SEVERAL DAYS
5. POOR APPETITE OR OVEREATING: NOT AT ALL
8. MOVING OR SPEAKING SO SLOWLY THAT OTHER PEOPLE COULD HAVE NOTICED. OR THE OPPOSITE, BEING SO FIGETY OR RESTLESS THAT YOU HAVE BEEN MOVING AROUND A LOT MORE THAN USUAL: NOT AT ALL
9. THOUGHTS THAT YOU WOULD BE BETTER OFF DEAD, OR OF HURTING YOURSELF: NOT AT ALL
3. TROUBLE FALLING OR STAYING ASLEEP OR SLEEPING TOO MUCH: SEVERAL DAYS

## 2021-04-17 ASSESSMENT — ENCOUNTER SYMPTOMS
NEUROLOGICAL NEGATIVE: 1
CARDIOVASCULAR NEGATIVE: 1
MUSCULOSKELETAL NEGATIVE: 1
GASTROINTESTINAL NEGATIVE: 1
PSYCHIATRIC NEGATIVE: 1
EYES NEGATIVE: 1
RESPIRATORY NEGATIVE: 1
FEVER: 0
CHILLS: 0

## 2021-04-17 ASSESSMENT — FIBROSIS 4 INDEX: FIB4 SCORE: 0.37

## 2021-04-17 NOTE — PROGRESS NOTES
"Patient c/o pain 8/10  Pulled morphine but realized when going to give  for q4 hours too early.  Discussed parameters for new oral meds I asked provider to start, she refused saying she cant take orals.  I asked what was the issue she with them she said she cant swallow them. I and she doesn't tolerate them.  I attempted to educate her lightly on IV pain medication administration use without making her upset.  I documented in mar she was offered and declined oral medication. Patient initially tried to tell me she couldn't swallow her pills.and I mentioned she seemed to be handling her breakfast of pancakes and sausage pretty well.    Multiple attempts to educate patient on oral and IV Pain medication use today.  This afternoon patient told me she was going to be discharged tomorrow and I discussed with her we need to work on getting her off IV pain medication and on oral medication so she isn't suddenly off pain medication at discharge.  At that time she discussed with me that she \"has medication at home\" I asked what she takes and she stated \"oxycodone and something else\". We discussed that she has oxycodone ordered, she stated \"it doesn't do anything for me\", I tried to express that I understood of she takes it at home her tolerance would be higher, or if she had nausea with it we have nausea medication, however I can't ask her doctor to change or adjust her meds until we show it doesn't work.  I also discussed at that time I am not telling her she can't have morphine, but that IV medication isn't great for pain control, it is used for breakthru pain and is very short acting, and we aren't managing her pain well by only using singly this medication.  I previously discussed this with the provider in rounds, discussed the hospital sticking to strict compliance of numerical pain scale for medication and the medications not being linked preventing nursing from administering medication in a manner that allows " to prevent just IV medication administration. However no orders were changed. After this discussion the patient seemed agreeable to taking oral medication for pain and the pain medication plan as discussed attempting oral and IV for break thru, rather then her current use which is only IV pain medication and refusal of oral medication.  However after leaving the room 20 mins after giving her a cup of coffee she asked for I was alerted by other staff she had discussed she was displeased with her care and received another cup of coffee.  The staff also alerted me that her spouse was now in the room, he previously wasn't there all day. I made the RN aware of the ongoing issue.      1753  Discussion with patient about night time meds, asked patient if she still wanted to do the pain regimen we had discussed before trying the oral medication, positive discussion about medication and pain control.  Patient amiable to taking oral medication and seeing if it works.

## 2021-04-17 NOTE — ASSESSMENT & PLAN NOTE
This is Sepsis Present on admission  SIRS criteria identified on my evaluation include: Tachycardia, with heart rate greater than 90 BPM and Leukocytosis, with WBC greater than 12,000  Source is otitis externa  Suspected onset of infection (date and time) unknown, >2 weeks  Sepsis protocol initiated  Fluid resuscitation ordered per protocol  IV antibiotics as appropriate for source of sepsis  While organ dysfunction may be noted elsewhere in this problem list or in the chart, degree of organ dysfunction does not meet CMS criteria for severe sepsis  -Trend lactic acid  -On NS at 100cc/hr

## 2021-04-17 NOTE — CARE PLAN
Problem: Infection  Goal: Will remain free from infection  Outcome: PROGRESSING AS EXPECTED  Note: IV abx regimen ordered     Problem: Fluid Volume:  Goal: Will maintain balanced intake and output  Outcome: PROGRESSING AS EXPECTED  Note: Fluids ordered     Problem: Pain Management  Goal: Pain level will decrease to patient's comfort goal  Outcome: PROGRESSING AS EXPECTED

## 2021-04-17 NOTE — H&P
Hospital Medicine History & Physical Note    Date of Service  4/17/2021    Primary Care Physician  Pcp Pt States None    Consultants  ENT    Code Status  Full Code    Chief Complaint  Chief Complaint   Patient presents with   • Facial Swelling     recurrent facial cellulitis/parotitis with multiple admissions/ED visits here and [facility]. R lymph swelling, pain and redness, worsening and feels like it is now spreading.       History of Presenting Illness  45 y.o. female who presented 4/16/2021 with recurrent facial cellulitis. Patient has been seen in multiple hospitals multiple times for the same problem. She was discharged from [facility] on 4/9 with antibiotics and eardrops. She completed the antibiotics from [facility] and felt better, but yesterday symptoms returned so she went to [facility] where she was given outpatient oral antibiotics and steroid injection. She believes that the steroid injection gave her diabetes and did not fill her antibiotics and came back to [facility]. Patient currently complains of right ear pain but appears to be comfortable. Will admit to medical floor for IV antibiotics.     Review of Systems  Review of Systems   Constitutional: Positive for malaise/fatigue. Negative for chills and fever.   HENT: Positive for ear discharge, ear pain and hearing loss.         Hearing loss is chronic, 13 years   Eyes: Negative.    Respiratory: Negative.    Cardiovascular: Negative.    Gastrointestinal: Negative.    Genitourinary: Negative.    Musculoskeletal: Negative.    Skin: Negative for itching and rash.   Neurological: Negative.    Psychiatric/Behavioral: Negative.        Past Medical History   has a past medical history of Anxiety, Bradycardia, Cellulitis, Crohn's disease ([facility]), Ear infection, MRSA (methicillin resistant Staphylococcus aureus), RA (rheumatoid arthritis) ([facility]), and Smoker.    Surgical History   has a past surgical history that includes other abdominal surgery and other.     Family  History  Denies pertinent family history     Social History   reports that she has been smoking cigarettes. She has a 16.00 pack-year smoking history. She has never used smokeless tobacco. She reports previous alcohol use. She reports that she does not use drugs.    Allergies  Allergies   Allergen Reactions   • Bee Venom Anaphylaxis   • Blackberry [Rubus Fruticosus] Anaphylaxis     Oral swelling   • Fish Anaphylaxis and Swelling   • Levaquin Vomiting and Swelling     Tongue/mouth swelling  Tolerated cipro 4/2021   • Pcn [Penicillins] Anaphylaxis     Tolerates Keflex 10/2020   • Toradol Hives     + Migraine     • Tramadol Hives     + Migraine     • Tree Nuts Food Allergy Anaphylaxis   • Bactrim [Sulfamethoxazole W-Trimethoprim] Itching     Face itching     • Stadol [Butorphanol] Unspecified     Pt reports it makes her feel like she can't wake up       Medications  Prior to Admission Medications   Prescriptions Last Dose Informant Patient Reported? Taking?   ALPRAZolam (XANAX) 0.25 MG Tab 4/16/2021 at AM Patient Yes No   Sig: Take 0.25 mg by mouth 2 times a day as needed for Sleep.   Biotin w/ Vitamins C & E (HAIR SKIN & NAILS GUMMIES PO) 4/16/2021 at Unknown time Patient Yes No   Sig: Take 3 Tablets by mouth every morning.   Probiotic Product (RA PROBIOTIC GUMMIES) Chew Tab 4/16/2021 at AM Patient Yes No   Sig: Chew 1 tablet every morning.   QUEtiapine (SEROQUEL) 100 MG Tab 4/15/2021 at PM Patient Yes No   Sig: Take 100 mg by mouth at bedtime.   doxycycline (VIBRAMYCIN) 100 MG Tab >3 DAYS at [facility] Patient Yes Yes   Sig: Take 100 mg by mouth 2 times a day.   neomycin sulf/polymyx B sulf/HC soln (CORTISPORIN HC SOL) 3.5-77360-4 Solution Not Taking at Unknown time Patient No No   Sig: Place 3 Drops in right ear 4 times a day. Administer drops to both ears.   Patient not taking: Reported on 4/16/2021   neomycin sulf/polymyx B sulf/HC soln (CORTISPORIN HC SOL) 3.5-53269-3 Solution Not Taking at Unknown time Patient No No    Sig: Administer 3 Drops into affected ear(s) 4 times a day. Administer drops to both ears.   Patient not taking: Reported on 4/16/2021   ondansetron (ZOFRAN ODT) 4 MG TABLET DISPERSIBLE Not Taking at Unknown time Patient No No   Sig: Take 1 Tab by mouth every four hours as needed (give PO if no IV route available).   Patient not taking: Reported on 4/16/2021   vancomycin (VANCOCIN HCL) 125 MG capsule Not Taking at Unknown time Patient No No   Sig: Take 1 capsule by mouth 2 times a day.   Patient not taking: Reported on 4/16/2021      Facility-Administered Medications: None       Physical Exam  Temp:  [37.2 °C (98.9 °F)] 37.2 °C (98.9 °F)  Pulse:  [] 60  Resp:  [18] 18  BP: (116-130)/(72-84) 127/79  SpO2:  [96 %-97 %] 96 %    Physical Exam  Vitals and nursing note reviewed.   Constitutional:       General: She is not in acute distress.     Appearance: Normal appearance. She is obese. She is not ill-appearing.   HENT:      Head: Normocephalic and atraumatic.      Comments: Right side facial swelling and erythema from lower eyelid to the jawline, neck is also swollen, tender to touch, cannot palpate for lymph nodes due to pain     Ears:      Comments: External ear of right tender to touch, crusted discharge seen, pus seen on the canal, tympanic membrane is not visualized     Nose: Nose normal.      Mouth/Throat:      Comments: Poor dentition  Eyes:      General: No scleral icterus.     Extraocular Movements: Extraocular movements intact.      Pupils: Pupils are equal, round, and reactive to light.   Cardiovascular:      Rate and Rhythm: Normal rate and regular rhythm.      Pulses: Normal pulses.      Heart sounds: Normal heart sounds. No murmur.   Pulmonary:      Effort: Pulmonary effort is normal. No respiratory distress.      Breath sounds: Normal breath sounds. No wheezing.   Abdominal:      General: Abdomen is flat. Bowel sounds are normal. There is no distension.      Palpations: Abdomen is soft.       Tenderness: There is no abdominal tenderness. There is no guarding.   Musculoskeletal:         General: No swelling or tenderness. Normal range of motion.      Cervical back: No rigidity.   Skin:     General: Skin is warm and dry.      Coloration: Skin is not jaundiced.   Neurological:      General: No focal deficit present.      Mental Status: She is alert and oriented to person, place, and time. Mental status is at baseline.      Cranial Nerves: No cranial nerve deficit.      Motor: No weakness.   Psychiatric:         Mood and Affect: Mood normal.         Judgment: Judgment normal.      Comments: Patient believes that she has insects in her ears         Laboratory:  Recent Labs     04/16/21 1952   WBC 15.9*   RBC 5.02   HEMOGLOBIN 14.1   HEMATOCRIT 43.5   MCV 86.7   MCH 28.1   MCHC 32.4*   RDW 44.6   PLATELETCT 386   MPV 9.4     Recent Labs     04/16/21 1952   SODIUM 139   POTASSIUM 3.9   CHLORIDE 103   CO2 22   GLUCOSE 103*   BUN 12   CREATININE 0.79   CALCIUM 10.2     Recent Labs     04/16/21 1952   ALTSGPT 18   ASTSGOT 13   ALKPHOSPHAT 160*   TBILIRUBIN 0.4   GLUCOSE 103*         No results for input(s): NTPROBNP in the last 72 hours.      No results for input(s): TROPONINT in the last 72 hours.    Imaging:  CT-MAXILLOFACIAL WITH PLUS RECONS   Final Result         1.  Skin thickening and fat stranding of the right external auditory canal, appearance most compatible with otitis externa. No enhancing fluid collection identified to indicate abscess.   2.  Hazy fat stranding adjacent to the right parotid gland, could represent changes related to suspected otitis externa, component of right parotiditis not excluded.      DX-CHEST-PORTABLE (1 VIEW)   Final Result         1.  Left basilar atelectasis or early infiltrate.            Assessment/Plan:  I anticipate this patient will require at least two midnights for appropriate medical management, necessitating inpatient admission.    Otitis externa, preauricular  cellulitis with parotiditis- (present on admission)  Assessment & Plan  -Right ear tender to touch with extensive pus seen in the canal, membrane cannot be visualized  -Due to MRSA and pseudomonal growth, pharmacy recommends vancomycin, flagyl, and rocephin for coverage  -Will obtain repeat blood culture  -ENT consult  -Pain control PRN    Sepsis (HCC)- (present on admission)  Assessment & Plan  This is Sepsis Present on admission  SIRS criteria identified on my evaluation include: Tachycardia, with heart rate greater than 90 BPM and Leukocytosis, with WBC greater than 12,000  Source is otitis externa  Suspected onset of infection (date and time) unknown, >2 weeks  Sepsis protocol initiated  Fluid resuscitation ordered per protocol  IV antibiotics as appropriate for source of sepsis  While organ dysfunction may be noted elsewhere in this problem list or in the chart, degree of organ dysfunction does not meet CMS criteria for severe sepsis  -Trend lactic acid  -On NS at 100cc/hr

## 2021-04-17 NOTE — ED NOTES
Med Rec complete per Pt at bedside w/family present.  Allergies reviewed.  Pt was on a 7 day course of Doxycycline 100mg bid, last dose was taken approx. 3 days ago.

## 2021-04-17 NOTE — ED PROVIDER NOTES
ED Provider Note    Scribed for Ashli Chen M.D. by Kika Kearney. 4/16/2021  8:50 PM    Primary care provider: None noted  Means of arrival: Walk-In  History obtained from: Patient  History limited by: None    CHIEF COMPLAINT  Chief Complaint   Patient presents with   • Facial Swelling     recurrent facial cellulitis/parotitis with multiple admissions/ED visits here and [facility]. R lymph swelling, pain and redness, worsening and feels like it is now spreading.       HPI  Charley Dunn is a 45 y.o. female who presents for evaluation of recurrent right sided facial swelling that is primarily towards the ear. The patient has dealt with this recurring pain for a long time now and has been admitted multiple times in the past at multiple different facilities.. She endorses associated lymph swelling and redness to the area of the swelling. She denies an associated fever. She finished an antibiotic regimen 3 days ago.  She said she was feeling better on the antibiotic regimen that she was on while here in the hospital last week.  Her symptoms started recurring yesterday so she went to [facility] and was given another prescription for antibiotics but the patient did not fill them.  She said she woke this morning and the pain and swelling was much worse. She has been using ear drops to alleviate her pain. Her ENT is Dr. Malik in [place] and she has an appointment scheduled on [address].      REVIEW OF SYSTEMS  See HPI for further details. All other systems are negative.    PAST MEDICAL HISTORY  Past Medical History:   Diagnosis Date   • Anxiety    • Bradycardia    • Cellulitis    • Crohn's disease (HCC)    • Ear infection    • MRSA (methicillin resistant Staphylococcus aureus)     to R face   • RA (rheumatoid arthritis) ([facility])    • Smoker        FAMILY HISTORY  Not pertinent     SOCIAL HISTORY  Social History     Socioeconomic History   • Marital status:      Spouse name:    • Number of  children:    • Years of education:    • Highest education level:    Occupational History       Tobacco Use   • Smoking status: Current Every Day Smoker     Packs/day: 0.50     Years: 32.00     Pack years: 16.00     Types: Cigarettes   • Smokeless tobacco: Never Used   Substance and Sexual Activity   • Alcohol use: Not Currently   • Drug use: Never   • Sexual activity: Not pertinent   Other Topics Concern   •    Social History Narrative   •      Social Determinants of Health     Financial Resource Strain: Low Risk    • Difficulty of Paying Living Expenses: Not very hard   Food Insecurity: No Food Insecurity   • Worried About Running Out of Food in the Last Year: Never true   • Ran Out of Food in the Last Year: Never true   Transportation Needs: No Transportation Needs   • Lack of Transportation (Medical): No   • Lack of Transportation (Non-Medical): No   Physical Activity:    • Days of Exercise per Week:    • Minutes of Exercise per Session:    Stress:    • Feeling of Stress :    Social Connections:    • Frequency of Communication with Friends and Family:    • Frequency of Social Gatherings with Friends and Family:    • Attends Hindu Services:    • Active Member of Clubs or Organizations:    • Attends Club or Organization Meetings:    • Marital Status:    Intimate Partner Violence:    • Fear of Current or Ex-Partner:    • Emotionally Abused:    • Physically Abused:    • Sexually Abused:        SURGICAL HISTORY  Past Surgical History:   Procedure Laterality Date   • OTHER     • OTHER ABDOMINAL SURGERY         CURRENT MEDICATIONS  Home Medications     Reviewed by Rubi Hutchinson PhT (Pharmacy Tech) on 04/16/21 at 2208  Med List Status: Complete   Medication Last Dose Status   ALPRAZolam (XANAX) 0.25 MG Tab 4/16/2021 Active   Biotin w/ Vitamins C & E (HAIR SKIN & NAILS GUMMIES PO) 4/16/2021 Active   doxycycline (VIBRAMYCIN) 100 MG Tab >3 DAYS Active   neomycin sulf/polymyx B sulf/HC soln (CORTISPORIN HC SOL)  "3.5-11443-5 Solution Not Taking Active   neomycin sulf/polymyx B sulf/HC soln (CORTISPORIN HC SOL) 3.5-01591-0 Solution Not Taking Active   ondansetron (ZOFRAN ODT) 4 MG TABLET DISPERSIBLE Not Taking Active   Probiotic Product (RA PROBIOTIC GUMMIES) Chew Tab 4/16/2021 Active   QUEtiapine (SEROQUEL) 100 MG Tab 4/15/2021 Active   vancomycin (VANCOCIN HCL) 125 MG capsule Not Taking Active                ALLERGIES  Allergies   Allergen Reactions   • Bee Venom Anaphylaxis   • Blackberry [Rubus Fruticosus] Anaphylaxis     Oral swelling   • Fish Anaphylaxis and Swelling   • Levaquin Vomiting and Swelling     Tongue/mouth swelling  Tolerated cipro 4/2021   • Pcn [Penicillins] Anaphylaxis     Tolerates Keflex 10/2020   • Toradol Hives     + Migraine     • Tramadol Hives     + Migraine     • Tree Nuts Food Allergy Anaphylaxis   • Bactrim [Sulfamethoxazole W-Trimethoprim] Itching     Face itching     • Stadol [Butorphanol] Unspecified     Pt reports it makes her feel like she can't wake up       PHYSICAL EXAM  VITAL SIGNS: /79   Pulse 60   Temp 37.2 °C (98.9 °F) (Temporal)   Resp 18   Ht 1.727 m (5' 8\")   Wt 72.9 kg (160 lb 11.5 oz)   LMP  (LMP Unknown)   SpO2 96%   BMI 24.44 kg/m²      Constitutional: Well developed, well nourished; Mild distress; Non-toxic appearance.   HENT:  TM's slightly opacified on right. Normocephalic, atraumatic; Bilateral external ears normal; very poor dentition.  There is erythema and swelling to the right side of the face and the right parotid gland region.  There is swelling to the right pinna with some pain with movement of the pinna.  The TM is visualized and is slightly opacified.  No drainage from the canal.  There is tender right-sided anterior cervical lymphadenopathy.  No trismus.  No drooling.  Eyes: PERRL, EOMI, Conjunctiva normal. No discharge.   Neck:  Supple, nontender midline; No stridor; No nuchal rigidity.   Lymphatic: No cervical lymphadenopathy noted. "   Cardiovascular: Regular rate and rhythm without murmurs, rubs, or gallop.   Thorax & Lungs: No respiratory distress, breath sounds clear to auscultation bilaterally without wheezing, rales or rhonchi. Nontender chest wall. No crepitus or subcutaneous air  Skin: Good color; warm and dry without rash or petechia.  Back: Nontender, No CVA tenderness.   Extremities: Distal radial, dorsalis pedis, posterior tibial pulses are equal bilaterally; No edema; Nontender calves or saphenous, No cyanosis, No clubbing.   Musculoskeletal: Good range of motion in all major joints. No tenderness to palpation or major deformities noted.   Neurologic: Alert & oriented x 4, clear speech    LABS/RADIOLOGY/PROCEDURES  Results for orders placed or performed during the hospital encounter of 04/16/21   Lactic acid (lactate)   Result Value Ref Range    Lactic Acid 2.8 (H) 0.5 - 2.0 mmol/L   CBC WITH DIFFERENTIAL   Result Value Ref Range    WBC 15.9 (H) 4.8 - 10.8 K/uL    RBC 5.02 4.20 - 5.40 M/uL    Hemoglobin 14.1 12.0 - 16.0 g/dL    Hematocrit 43.5 37.0 - 47.0 %    MCV 86.7 81.4 - 97.8 fL    MCH 28.1 27.0 - 33.0 pg    MCHC 32.4 (L) 33.6 - 35.0 g/dL    RDW 44.6 35.9 - 50.0 fL    Platelet Count 386 164 - 446 K/uL    MPV 9.4 9.0 - 12.9 fL    Neutrophils-Polys 81.70 (H) 44.00 - 72.00 %    Lymphocytes 14.10 (L) 22.00 - 41.00 %    Monocytes 3.20 0.00 - 13.40 %    Eosinophils 0.00 0.00 - 6.90 %    Basophils 0.10 0.00 - 1.80 %    Immature Granulocytes 0.90 0.00 - 0.90 %    Nucleated RBC 0.00 /100 WBC    Neutrophils (Absolute) 13.00 (H) 2.00 - 7.15 K/uL    Lymphs (Absolute) 2.24 1.00 - 4.80 K/uL    Monos (Absolute) 0.51 0.00 - 0.85 K/uL    Eos (Absolute) 0.00 0.00 - 0.51 K/uL    Baso (Absolute) 0.02 0.00 - 0.12 K/uL    Immature Granulocytes (abs) 0.14 (H) 0.00 - 0.11 K/uL    NRBC (Absolute) 0.00 K/uL   COMP METABOLIC PANEL   Result Value Ref Range    Sodium 139 135 - 145 mmol/L    Potassium 3.9 3.6 - 5.5 mmol/L    Chloride 103 96 - 112 mmol/L     Co2 22 20 - 33 mmol/L    Anion Gap 14.0 7.0 - 16.0    Glucose 103 (H) 65 - 99 mg/dL    Bun 12 8 - 22 mg/dL    Creatinine 0.79 0.50 - 1.40 mg/dL    Calcium 10.2 8.5 - 10.5 mg/dL    AST(SGOT) 13 12 - 45 U/L    ALT(SGPT) 18 2 - 50 U/L    Alkaline Phosphatase 160 (H) 30 - 99 U/L    Total Bilirubin 0.4 0.1 - 1.5 mg/dL    Albumin 4.9 3.2 - 4.9 g/dL    Total Protein 9.3 (H) 6.0 - 8.2 g/dL    Globulin 4.4 (H) 1.9 - 3.5 g/dL    A-G Ratio 1.1 g/dL   URINALYSIS    Specimen: Urine, Clean Catch   Result Value Ref Range    Color Yellow     Character Clear     Specific Gravity 1.025 <1.035    Ph 6.5 5.0 - 8.0    Glucose Negative Negative mg/dL    Ketones Negative Negative mg/dL    Protein Negative Negative mg/dL    Bilirubin Negative Negative    Urobilinogen, Urine 0.2 Negative    Nitrite Negative Negative    Leukocyte Esterase Negative Negative    Occult Blood Negative Negative    Micro Urine Req see below    ESTIMATED GFR   Result Value Ref Range    GFR If African American >60 >60 mL/min/1.73 m 2    GFR If Non African American >60 >60 mL/min/1.73 m 2   URINE DRUG SCREEN   Result Value Ref Range    Amphetamines Urine Negative Negative    Barbiturates Negative Negative    Benzodiazepines Negative Negative    Cocaine Metabolite Negative Negative    Methadone Negative Negative    Opiates Positive (A) Negative    Oxycodone Negative Negative    Phencyclidine -Pcp Negative Negative    Propoxyphene Negative Negative    Cannabinoid Metab Negative Negative       CT-MAXILLOFACIAL WITH PLUS RECONS   Final Result         1.  Skin thickening and fat stranding of the right external auditory canal, appearance most compatible with otitis externa. No enhancing fluid collection identified to indicate abscess.   2.  Hazy fat stranding adjacent to the right parotid gland, could represent changes related to suspected otitis externa, component of right parotiditis not excluded.      DX-CHEST-PORTABLE (1 VIEW)   Final Result         1.  Left basilar  atelectasis or early infiltrate.      All labs and imaging reviewed by me as shown above.     COURSE & MEDICAL DECISION MAKING  Pertinent Labs & Imaging studies reviewed. (See chart for details)    Reviewed patient's old medical records which showed that the patient has been seen many times in [place] for the same chief complaint. She did not refill her antibiotics following her last hospitalization.  Review of records from [place] reveal the patient has had culture proven Psuedomonas and MRSA with this condition.    8:50 PM - Patient seen and examined at bedside. I verified that the patient was wearing a mask and I was wearing appropriate PPE every time I entered the room. The patient's mask was on the patient at all times during my encounter except for a brief view of the oropharynx. Discussed plan of care, including CT of face and possible hospitalization. Patient agrees to the plan of care. Ordered for labs and imaging to evaluate her symptoms.     2130: I discussed antibiotic options with Fabio, pharmacist.  He feels the best regimen would be Rocephin, vancomycin and Flagyl.    2345:  Paged hospitalist    0002: I spoke with Dr. Barakat, hospitalist on-call, and she will kindly evaluate the patient for hospitalization.    Patient presents to the ER complaining of recurrent right-sided facial swelling, pain and redness.  The patient has history of a right-sided cellulitis, parotitis, and periauricular cellulitis.  She has history of underlying autoimmune disease.  She is scheduled to see an ENT doctor in [place] on April 27.  Patient has been in and out of multiple ERs throughout the region with same complaint.  She is admitted multiple times.  She was just admitted here on April 7 and discharged on April night.  She did get significant improvement of her symptoms with the antibiotic regimen which was prescribed to her upon admission.  She was discharged on antibiotics and said she finished all of her  antibiotics.  However, 3 days after completing her antibiotic regimen, the symptoms started recurring.  She went to [facility] ER yesterday and was given a prescription for antibiotics but she was unable to get them filled.  She comes in today saying that the pain and swelling and redness is worse.  Her white count was 6.7 upon discharge on April night.  White count was 11,000 yesterday at [facility].  White count is 16,000 today.  She has a lactic acid level of 2.8.  She has not had fevers or chills.  She has quite a lot of swelling and tenderness over the right side of her face.  Is tracking up around the lower portion of her eye.  Symptoms are consistent with her recurrent facial cellulitis and parotid gland infections in the past.  I spoke with pharmacy.  Fabio, pharmacist, felt the best regimen would be Rocephin, vancomycin and Flagyl.  Review of records from [facility] reveal that patient has had culture proven Pseudomonas and MRSA with this condition.  This time I think patient needs to be hospitalized for another round of IV antibiotics.  She has been treated with IV antibiotics and IV fluids per sepsis protocol with IV antibiotics given her elevated white blood cell count, lactic acid level, and tachycardia upon arrival.  Will speak with the hospitalist regarding evaluation for hospitalization.        FINAL IMPRESSION  1. Facial cellulitis Acute          This dictation has been created using voice recognition software. The accuracy of the dictation is limited by the abilities of the software. I expect there may be some errors of grammar and possibly content. I made every attempt to manually correct the errors within my dictation. However, errors related to voice recognition software may still exist and should be interpreted within the appropriate context.     I, Kika Kearney (Deborah), am scribing for, and in the presence of, Ashli Chen M.D..    Electronically signed by: Kika Kearney  (Scribe), 4/16/2021    Ashli JENNINGS M.D. personally performed the services described in this documentation, as scribed by Kika Kearney in my presence, and it is both accurate and complete.    The note accurately reflects work and decisions made by me.  Ashli Chen M.D.  4/16/2021  9:44 PM

## 2021-04-17 NOTE — PROGRESS NOTES
"Patient educated that she is on a thickened diet per her provider.  Patient upset, unaware I was on the other side of the curtain stated \"This is bullshit, I'm about over this\"  Then stated \"Im gonna drink this then\" and I could hear a carbonated beverage being opened.  Did not directly address patient or note my presence to her.   "

## 2021-04-17 NOTE — ASSESSMENT & PLAN NOTE
-Right ear tender to touch with extensive pus seen in the canal, membrane cannot be visualized  -Due to MRSA and pseudomonal growth, pharmacy recommends vancomycin, flagyl, and rocephin for coverage  -Will obtain repeat blood culture  -ENT consult  -Pain control PRN

## 2021-04-17 NOTE — PROGRESS NOTES
Patient was admitted overnight by my colleague for facial cellulitis, otitis externa.  CT scan found skin thickening and fashioning in the right external auditory canal and paroditis.  Continue IV antibiotics and IV fluids.

## 2021-04-17 NOTE — PROGRESS NOTES
"Pharmacy Kinetics 45 y.o. female on vancomycin day # 1 2021    Currently on Vancomycin 1250 mg iv q12hr (0000, 1200)  Provider specified end date: TBD    Indication for Treatment: facial cellulitis/parotitis    Pertinent history per medical record: Admitted on 2021 for facial swelling. Pt with recurrent facial swelling and pain with multiple admissions to different facilities recently. Also endorses lymph node swelling and redness on area of swelling. Pt finished 7-day course of doxycycline 100 mg BID 3 days ago. Pt also received vancomycin and cefuroxime during recent hospital stays.    Other antibiotics: ceftriaxone 1 g q24h, Flagyl 500 mg IV q8h    Allergies: Bee venom, Blackberry [rubus fruticosus], Fish, Levaquin, Pcn [penicillins], Toradol, Tramadol, Tree nuts food allergy, Bactrim [sulfamethoxazole w-trimethoprim], and Stadol [butorphanol]     List concerns for renal function: none    Pertinent cultures to date:   3/17: blood x4: pending    Recent Labs     21   WBC 15.9*   NEUTSPOLYS 81.70*     Recent Labs     21   BUN 12   CREATININE 0.79   ALBUMIN 4.9       Intake/Output Summary (Last 24 hours) at 2021 0021  Last data filed at 2021 2335  Gross per 24 hour   Intake 200 ml   Output --   Net 200 ml      /79   Pulse 60   Temp 37.2 °C (98.9 °F) (Temporal)   Resp 18   Ht 1.727 m (5' 8\")   Wt 72.9 kg (160 lb 11.5 oz)   SpO2 96%  Temp (24hrs), Av.2 °C (98.9 °F), Min:37.2 °C (98.9 °F), Max:37.2 °C (98.9 °F)      A/P   1. Vancomycin dose change: new start  2. Next vancomycin level: next 48-72 hours (not ordered)  3. Goal trough: 10-15 mcg/mL  4. Comments: vancomycin, ceftriaxone, and Flagyl initiated empirically for recurrent facial cellulitis/parotitis. No concern for accumulation, will proceed with protocol dose of 16 mg/kg q12h. Pharmacy will continue to follow.    Ambrose Padilla, PharmD  "

## 2021-04-17 NOTE — ED TRIAGE NOTES
"Charley Dunn  45 y.o. female  Chief Complaint   Patient presents with   • Facial Swelling     recurrent facial cellulitis/parotitis with multiple admissions/ED visits here and [facility]. R lymph swelling, pain and redness, worsening and feels like it is now spreading.       Patient ambulatory to triage in  for above complaint. Pt has appt with oncology for eval of possible dx of non-Hodgkins's lymphoma in Aug and ENT appt at end of April.     Redness and swelling to R jaw and R throat noted. Reports assoc dizziness due to swelling around ear. Pt speaking in full sentences, airway patent and intact.    Patient is alert and oriented, speaking in full sentences, following commands, and responding appropriately to questions. Educated on triage process and instructed patient to alert staff to any changes in condition or worsening symptoms.     /84   Pulse (!) 110   Temp 37.2 °C (98.9 °F) (Temporal)   Resp 18   Ht 1.727 m (5' 8\")   Wt 72.9 kg (160 lb 11.5 oz)   LMP  (LMP Unknown)   SpO2 96%   BMI 24.44 kg/m²       "

## 2021-04-17 NOTE — PROGRESS NOTES
2 RN Skin Check:    4x old abd lap sites healed GEO (previous abd surgeries)  R facial cellulitis swelling/redness  R ear and neck swelling/redness  Rash under bilat breasts (yeast per pt)  RLE anterior calf bruise and scab    20g RFA PIV CDI  20g R hand PIV CDI    Pt independently ambulatory  All other areas and bony prominences CDI

## 2021-04-18 ENCOUNTER — PATIENT OUTREACH (OUTPATIENT)
Dept: HEALTH INFORMATION MANAGEMENT | Facility: OTHER | Age: 46
End: 2021-04-18

## 2021-04-18 VITALS
OXYGEN SATURATION: 96 % | DIASTOLIC BLOOD PRESSURE: 79 MMHG | SYSTOLIC BLOOD PRESSURE: 132 MMHG | WEIGHT: 165.79 LBS | BODY MASS INDEX: 25.13 KG/M2 | HEART RATE: 58 BPM | HEIGHT: 68 IN | RESPIRATION RATE: 18 BRPM | TEMPERATURE: 97.9 F

## 2021-04-18 LAB
ALBUMIN SERPL BCP-MCNC: 2.9 G/DL (ref 3.2–4.9)
ALBUMIN/GLOB SERPL: 1.1 G/DL
ALP SERPL-CCNC: 93 U/L (ref 30–99)
ALT SERPL-CCNC: 12 U/L (ref 2–50)
ANION GAP SERPL CALC-SCNC: 4 MMOL/L (ref 7–16)
AST SERPL-CCNC: 10 U/L (ref 12–45)
BASOPHILS # BLD AUTO: 0.4 % (ref 0–1.8)
BASOPHILS # BLD: 0.03 K/UL (ref 0–0.12)
BILIRUB SERPL-MCNC: 0.2 MG/DL (ref 0.1–1.5)
BUN SERPL-MCNC: 7 MG/DL (ref 8–22)
CALCIUM SERPL-MCNC: 8.1 MG/DL (ref 8.5–10.5)
CHLORIDE SERPL-SCNC: 110 MMOL/L (ref 96–112)
CO2 SERPL-SCNC: 23 MMOL/L (ref 20–33)
CREAT SERPL-MCNC: 0.62 MG/DL (ref 0.5–1.4)
EOSINOPHIL # BLD AUTO: 0.16 K/UL (ref 0–0.51)
EOSINOPHIL NFR BLD: 2.1 % (ref 0–6.9)
ERYTHROCYTE [DISTWIDTH] IN BLOOD BY AUTOMATED COUNT: 47.5 FL (ref 35.9–50)
GLOBULIN SER CALC-MCNC: 2.6 G/DL (ref 1.9–3.5)
GLUCOSE SERPL-MCNC: 84 MG/DL (ref 65–99)
HCT VFR BLD AUTO: 32.1 % (ref 37–47)
HGB BLD-MCNC: 10.1 G/DL (ref 12–16)
IMM GRANULOCYTES # BLD AUTO: 0.04 K/UL (ref 0–0.11)
IMM GRANULOCYTES NFR BLD AUTO: 0.5 % (ref 0–0.9)
LYMPHOCYTES # BLD AUTO: 3.07 K/UL (ref 1–4.8)
LYMPHOCYTES NFR BLD: 41 % (ref 22–41)
MAGNESIUM SERPL-MCNC: 2 MG/DL (ref 1.5–2.5)
MCH RBC QN AUTO: 28.8 PG (ref 27–33)
MCHC RBC AUTO-ENTMCNC: 32.1 G/DL (ref 33.6–35)
MCV RBC AUTO: 89.8 FL (ref 81.4–97.8)
MONOCYTES # BLD AUTO: 0.43 K/UL (ref 0–0.85)
MONOCYTES NFR BLD AUTO: 5.7 % (ref 0–13.4)
NEUTROPHILS # BLD AUTO: 3.75 K/UL (ref 2–7.15)
NEUTROPHILS NFR BLD: 50.3 % (ref 44–72)
NRBC # BLD AUTO: 0 K/UL
NRBC BLD-RTO: 0 /100 WBC
PHOSPHATE SERPL-MCNC: 3.3 MG/DL (ref 2.5–4.5)
PLATELET # BLD AUTO: 221 K/UL (ref 164–446)
PMV BLD AUTO: 9.7 FL (ref 9–12.9)
POTASSIUM SERPL-SCNC: 3.6 MMOL/L (ref 3.6–5.5)
PROT SERPL-MCNC: 5.5 G/DL (ref 6–8.2)
RBC # BLD AUTO: 3.54 M/UL (ref 4.2–5.4)
SODIUM SERPL-SCNC: 137 MMOL/L (ref 135–145)
WBC # BLD AUTO: 7.5 K/UL (ref 4.8–10.8)

## 2021-04-18 PROCEDURE — 700101 HCHG RX REV CODE 250: Performed by: INTERNAL MEDICINE

## 2021-04-18 PROCEDURE — 700102 HCHG RX REV CODE 250 W/ 637 OVERRIDE(OP): Performed by: HOSPITALIST

## 2021-04-18 PROCEDURE — 84100 ASSAY OF PHOSPHORUS: CPT

## 2021-04-18 PROCEDURE — 80053 COMPREHEN METABOLIC PANEL: CPT

## 2021-04-18 PROCEDURE — 700105 HCHG RX REV CODE 258: Performed by: HOSPITALIST

## 2021-04-18 PROCEDURE — 700111 HCHG RX REV CODE 636 W/ 250 OVERRIDE (IP): Performed by: INTERNAL MEDICINE

## 2021-04-18 PROCEDURE — A9270 NON-COVERED ITEM OR SERVICE: HCPCS | Performed by: HOSPITALIST

## 2021-04-18 PROCEDURE — 83735 ASSAY OF MAGNESIUM: CPT

## 2021-04-18 PROCEDURE — 85025 COMPLETE CBC W/AUTO DIFF WBC: CPT

## 2021-04-18 PROCEDURE — 99239 HOSP IP/OBS DSCHRG MGMT >30: CPT | Performed by: HOSPITALIST

## 2021-04-18 RX ORDER — QUETIAPINE FUMARATE 100 MG/1
100 TABLET, FILM COATED ORAL
Qty: 10 TABLET | Refills: 0 | Status: SHIPPED | OUTPATIENT
Start: 2021-04-18 | End: 2021-04-28

## 2021-04-18 RX ORDER — METRONIDAZOLE 500 MG/1
500 TABLET ORAL 3 TIMES DAILY
Qty: 30 TABLET | Refills: 0 | Status: SHIPPED | OUTPATIENT
Start: 2021-04-18 | End: 2021-04-28

## 2021-04-18 RX ORDER — CEFDINIR 300 MG/1
300 CAPSULE ORAL 2 TIMES DAILY
Qty: 20 CAPSULE | Refills: 0 | Status: SHIPPED | OUTPATIENT
Start: 2021-04-18 | End: 2021-04-28

## 2021-04-18 RX ADMIN — ALPRAZOLAM 0.25 MG: 0.25 TABLET ORAL at 10:29

## 2021-04-18 RX ADMIN — METRONIDAZOLE 500 MG: 500 INJECTION, SOLUTION INTRAVENOUS at 04:32

## 2021-04-18 RX ADMIN — MORPHINE SULFATE 2 MG: 4 INJECTION INTRAVENOUS at 04:32

## 2021-04-18 RX ADMIN — OXYCODONE 5 MG: 5 TABLET ORAL at 09:52

## 2021-04-18 RX ADMIN — ENOXAPARIN SODIUM 40 MG: 40 INJECTION SUBCUTANEOUS at 04:32

## 2021-04-18 RX ADMIN — SODIUM CHLORIDE, POTASSIUM CHLORIDE, SODIUM LACTATE AND CALCIUM CHLORIDE: 600; 310; 30; 20 INJECTION, SOLUTION INTRAVENOUS at 09:57

## 2021-04-18 ASSESSMENT — PAIN DESCRIPTION - PAIN TYPE
TYPE: ACUTE PAIN
TYPE: ACUTE PAIN

## 2021-04-18 NOTE — PROGRESS NOTES
Patient assessment done, medicated per mar.  Discussed with MD prior to rounds and MD was patient in rounds.  New orders for discharge and discharge meds, no new medication for inpatient use.  Bed low and locked, all needs met.  Patient is independent in ADL's Vitals are stable and within patient baseline.  Right side of face doesn't have any signs of swelling and didn't appear swollen yesterday. Patient is appropriate for discharge lounge.  I will let patient finish breakfast and alert her she is for discharge.

## 2021-04-18 NOTE — DISCHARGE INSTRUCTIONS
Discharge Instructions    Discharged to home by car with relative. Discharged via wheelchair, hospital escort: Yes.  Special equipment needed: Not Applicable    Be sure to schedule a follow-up appointment with your primary care doctor or any specialists as instructed.     Discharge Plan:   Diet Plan: Discussed  Activity Level: Discussed  Confirmed Follow up Appointment: Patient to Call and Schedule Appointment  Confirmed Symptoms Management: Discussed  Medication Reconciliation Updated: Yes    I understand that a diet low in cholesterol, fat, and sodium is recommended for good health. Unless I have been given specific instructions below for another diet, I accept this instruction as my diet prescription.   Other diet: as tolerated    Special Instructions:   Follow up with MD  Take antibiotics as prescribed      Facial and Ear  Judy tonsillar   Teeth and Gum Care  Most problems with teeth and gums can be prevented if you do the following:  · Brush your teeth.  · Brush after each meal or at least 3 times a day with a soft bristle tooth brush.  · Brush in a circular motion.  · Brush close to your gums as well as your teeth.  · Brush your back teeth longer than your front teeth.  · Floss your teeth once a day. Before bedtime is a good time to floss.  · Go to a dentist 2 times a year.  · Eat a balanced diet.  · Eat good meals including fruits, vegetables, fish, chicken or other meat, and milk products.  · Drink 8 to 10 glasses of water a day.  · Do not eat candy or snack foods.  · Do not  drink sugar-sweetened soft drinks or juice.  · If a problem develops, go to your dentist right away.  · Common problems are cavities, bleeding gums, sores or lumps in your mouth, or pain in your teeth, gums or jaws.  · It is important to see a Dentist right away for these problems because you may lose your teeth or have a lot of pain if you do not go soon.  · Take care of your child's teeth.  · Brush the teeth with a soft bristle tooth  brush.  · Do not let your child eat or drink sweet foods and drinks.  · Do not let your child fall asleep with a bottle in his or her mouth.  · Take your child to a dentist when they are between 2 and 3 years old. This is the age you should start taking your child to a dentist. If your child has problems with their teeth, take them to a dentist sooner.  · If a tooth is knocked out (yours or your child's):  · Save the tooth if possible.  · Wrap it in tissue or put it in a glass of milk.  · Take it with you to your dentist right away.  Document Released: 09/26/2009 Document Revised: 03/11/2013 Document Reviewed: 09/26/2009  [employer]® Patient Information ©2014 [employer].  Cellulitis    Peritonsillar cellulitis is an infection of the tissue around a tonsil that results in a severe sore throat. This infection can be treated with antibiotic medicine. If it is not treated, a collection of pus can form in the throat (peritonsillar abscess), which may require drainage.  What are the causes?  This condition is usually caused by a combination of several types of bacteria. You may get infected by:  · Breathing in droplets from an infected person's cough or sneeze.  · Touching something that has been exposed to the bacteria (has been contaminated) and then touching your mouth, nose, or eyes.  What increases the risk?  This condition is more likely to develop in people who:  · Have frequent tonsil infections.  · Have gum disease or a dental infection.  · Smoke.  What are the signs or symptoms?  Early symptoms of this condition include:  · Fever and chills.  · Soreness on one side of the throat.  · Pain in one ear.  · Pain when swallowing.  · Tiredness.  As the infection gets worse, symptoms may include:  · Severe pain when swallowing.  · Drooling.  · Trouble opening the mouth wide.  · Bad breath.  · Changes in how the voice sounds, such as hoarseness.  How is this diagnosed?  This condition may be diagnosed based on:  · Your  symptoms.  · A physical exam.  · Testing a fluid sample from your throat (throat culture). This helps determine which types of bacteria are causing your infection.  · A blood test.  How is this treated?  This condition is usually treated with antibiotic medicines. You may need to take these medicines by mouth or through an IV at the hospital. Treatment may also include:  · Medicines for pain, fever, or swelling. Some of these medicines may be given through an IV.  · Draining a peritonsillar abscess, if you have one. Your health care provider may drain the abscess by using a needle or by making an incision in the abscess.  · Surgery to remove the tonsils (tonsillectomy). This may be done if you get peritonsillar cellulitis often.  Follow these instructions at home:  Eating and drinking         · If it is difficult or painful to swallow, try only drinking liquids or only eating soft foods until you feel better.  · Drink enough fluid to keep your urine pale yellow.  Medicines  · If you were prescribed an antibiotic to take at home, take it as told by your health care provider. Do not stop taking the antibiotic even if you start to feel better.  · Take other over-the-counter and prescription medicines only as told by your health care provider. Your health care provider may recommend taking over-the-counter medicines to relieve pain, a fever, or swelling.  Activity  · Rest and get plenty of sleep.  · Return to your normal activities, including school or work, as told by your health care provider.  General instructions  · Do not use any products that contain nicotine or tobacco, such as cigarettes and e-cigarettes. If you need help quitting, ask your health care provider.  · To help ease pain and swelling, gargle with a salt-water mixture 3-4 times a day or as needed. To make a salt-water mixture, completely dissolve ½-1 tsp of salt in 1 cup of warm water.  · Keep all follow-up visits as told by your health care provider.  This is important.  Contact a health care provider if:  · You have pain or swelling that gets worse.  · You develop difficulty swallowing.  · You have a fever that does not improve after you take medicine.  · Your voice changes.  · You see pus around or near your tonsils. This may look like yellowish-white fluid.  Get help right away if:  · You have trouble breathing.  · You have severe pain.  · You cough up bloody spit.  · You are unable to swallow.  · You cannot stop drooling.  Summary  · Peritonsillar cellulitis is an infection of the tissue around a tonsil that results in a severe sore throat. This is treated with antibiotics. If the condition is not treated, a collection of pus can form in the throat (peritonsillar abscess), which may need to be drained.  · This condition is usually treated with antibiotic medicines. You may need to take these medicines by mouth or through an IV at the hospital.  · Rest and get plenty of sleep. Return to your normal activities as told by your health care provider.  This information is not intended to replace advice given to you by your health care provider. Make sure you discuss any questions you have with your health care provider.  Document Released: 03/14/2011 Document Revised: 03/18/2019 Document Reviewed: 09/18/2018  [employer] Patient Education © 2020 [employer]      · Is patient discharged on Warfarin / Coumadin?   No     Depression / Suicide Risk    As you are discharged from this [facility] facility, it is important to learn how to keep safe from harming yourself.    Recognize the warning signs:  · Abrupt changes in personality, positive or negative- including increase in energy   · Giving away possessions  · Change in eating patterns- significant weight changes-  positive or negative  · Change in sleeping patterns- unable to sleep or sleeping all the time   · Unwillingness or inability to communicate  · Depression  · Unusual sadness, discouragement and  loneliness  · Talk of wanting to die  · Neglect of personal appearance   · Rebelliousness- reckless behavior  · Withdrawal from people/activities they love  · Confusion- inability to concentrate     If you or a loved one observes any of these behaviors or has concerns about self-harm, here's what you can do:  · Talk about it- your feelings and reasons for harming yourself  · Remove any means that you might use to hurt yourself (examples: pills, rope, extension cords, firearm)  · Get professional help from the community (Mental Health, Substance Abuse, psychological counseling)  · Do not be alone:Call your Safe Contact- someone whom you trust who will be there for you.  · Call your local CRISIS HOTLINE 978-2393 or 270-319-0018  · Call your local Children's Mobile Crisis Response Team [place] (931) 796-4082 or www.docTrackr  · Call the toll free National Suicide Prevention Hotlines   · National Suicide Prevention Lifeline 504-661-PTAE (4179)  · National Hope Line Network 800-SUICIDE (534-4069)

## 2021-04-18 NOTE — DISCHARGE SUMMARY
Discharge Summary    CHIEF COMPLAINT ON ADMISSION  Chief Complaint   Patient presents with   • Facial Swelling     recurrent facial cellulitis/parotitis with multiple admissions/ED visits here and [facility]. R lymph swelling, pain and redness, worsening and feels like it is now spreading.       Reason for Admission  Wound Check      Admission Date  4/16/2021    CODE STATUS  Prior    HPI & HOSPITAL COURSE  This is a 45 y.o. female here with with recurrent facial cellulitis. Patient has been seen in multiple hospitals multiple times for the same problem. She was discharged from [facility] on 4/9 with antibiotics and eardrops. She completed the antibiotics from [facility] and felt better, but yesterday symptoms returned so she went to [facility] where she was given outpatient oral antibiotics and steroid injection.  Is also associated with right ear pain and swelling of the right cheek.  CT scan found skin thickening and, fat stranding of the right external auditory canal compatible with otitis externa.  Hazy fat stranding adjacent to the right parotid gland.  Patient was started on ceftriaxone and Flagyl.  These antibiotics significantly improved her symptoms and she was given 10 days additionally of this.  She has a follow-up appointment with ENT in 1 week to evaluate for cause of parotiditis  No notes on file    Therefore, she is discharged in good and stable condition to home with close outpatient follow-up.    The patient met 2-midnight criteria for an inpatient stay at the time of discharge.    Discharge Date  4/18/2021    FOLLOW UP ITEMS POST DISCHARGE  Follow up with ENT    DISCHARGE DIAGNOSES  Active Problems:    Otitis externa, preauricular cellulitis with parotiditis POA: Yes    Sepsis (HCC) POA: Yes  Resolved Problems:    * No resolved hospital problems. *      FOLLOW UP  No future appointments.  [address] [address] [phone]    Please call to schedule  and establish with a primary provider.    Pcp Pt States None            MEDICATIONS ON DISCHARGE     Medication List      START taking these medications      Instructions   cefdinir 300 MG Caps  Commonly known as: OMNICEF   Take 1 capsule by mouth 2 times a day for 10 days.  Dose: 300 mg     metroNIDAZOLE 500 MG Tabs  Commonly known as: FLAGYL   Take 1 tablet by mouth 3 times a day for 10 days.  Dose: 500 mg        CONTINUE taking these medications      Instructions   ALPRAZolam 0.25 MG Tabs  Commonly known as: XANAX   Take 0.25 mg by mouth 2 times a day as needed for Sleep.  Dose: 0.25 mg     HAIR SKIN & NAILS GUMMIES PO   Take 3 Tablets by mouth every morning.  Dose: 3 tablet     QUEtiapine 100 MG Tabs  Commonly known as: Seroquel   Take 1 tablet by mouth at bedtime for 10 days.  Dose: 100 mg        STOP taking these medications    doxycycline 100 MG Tabs  Commonly known as: VIBRAMYCIN     neomycin sulf/polymyx B sulf/HC soln [id number] Soln  Commonly known as: CORTISPORIN HC SOL     ondansetron 4 MG Tbdp  Commonly known as: ZOFRAN ODT     RA Probiotic Gummies Chew     vancomycin 125 MG capsule  Commonly known as: Vancocin HCl            Allergies  Allergies   Allergen Reactions   • Bee Venom Anaphylaxis   • Blackberry [Rubus Fruticosus] Anaphylaxis     Oral swelling   • Fish Anaphylaxis and Swelling   • Levaquin Vomiting and Swelling     Tongue/mouth swelling  Tolerated cipro 4/2021   • Pcn [Penicillins] Anaphylaxis     Tolerates Keflex 10/2020   • Toradol Hives     + Migraine     • Tramadol Hives     + Migraine     • Tree Nuts Food Allergy Anaphylaxis   • Bactrim [Sulfamethoxazole W-Trimethoprim] Itching     Face itching     • Stadol [Butorphanol] Unspecified     Pt reports it makes her feel like she can't wake up       DIET  No orders of the defined types were placed in this encounter.      ACTIVITY  As tolerated.  Weight bearing as tolerated    CONSULTATIONS  ENT    PROCEDURES  None    LABORATORY  Lab Results    Component Value Date    SODIUM 137 04/18/2021    POTASSIUM 3.6 04/18/2021    CHLORIDE 110 04/18/2021    CO2 23 04/18/2021    GLUCOSE 84 04/18/2021    BUN 7 (L) 04/18/2021    CREATININE 0.62 04/18/2021        Lab Results   Component Value Date    WBC 7.5 04/18/2021    HEMOGLOBIN 10.1 (L) 04/18/2021    HEMATOCRIT 32.1 (L) 04/18/2021    PLATELETCT 221 04/18/2021        Total time of the discharge process exceeds 50 minutes.

## 2021-04-18 NOTE — PROGRESS NOTES
Pt A&O x 4.     Pt rates pain 8 out of 10. Medicated per MAR.      Neuro: HULL. Denies new onset of numbness/ tingling.     Cardiac: Denies new onset of chest pain.     Vascular: Pulses 2+ BUE, BLE. No edema noted.     Respiratory: Lungs sound clear to auscultation. On room air.  on, satting in 90's. Denies SOB.     GI: Abdomen soft, non-tender. Normoactive bowel sounds, + flatus, + BM. Denies nausea/ vomiting.     : Pt voiding adequately.      MSK: Pt up to bathroom up with stand by assist, tolerating well.     Integumentary: Mild R facial swelling and flaky/dry skin. Skin intact otherwise.     Labs noted.     Fall precautions in place: Bed locked in lowest position, Upper bed rails up, treaded socks in place, personal belongings within reach, call light within reach, appropriate mobility signs in place, - bed alarm. Pt calls appropriately.      Pt updated on POC.

## 2021-04-19 LAB
BACTERIA UR CULT: NORMAL
SIGNIFICANT IND 70042: NORMAL
SITE SITE: NORMAL
SOURCE SOURCE: NORMAL

## 2021-04-21 LAB
BACTERIA BLD CULT: NORMAL
SIGNIFICANT IND 70042: NORMAL
SITE SITE: NORMAL
SOURCE SOURCE: NORMAL

## 2021-04-22 ENCOUNTER — PATIENT OUTREACH (OUTPATIENT)
Dept: HEALTH INFORMATION MANAGEMENT | Facility: OTHER | Age: 46
End: 2021-04-22

## 2021-04-22 ENCOUNTER — APPOINTMENT (OUTPATIENT)
Dept: RADIOLOGY | Facility: MEDICAL CENTER | Age: 46
End: 2021-04-22
Attending: EMERGENCY MEDICINE

## 2021-04-22 ENCOUNTER — HOSPITAL ENCOUNTER (EMERGENCY)
Facility: MEDICAL CENTER | Age: 46
End: 2021-04-22
Attending: EMERGENCY MEDICINE

## 2021-04-22 VITALS
TEMPERATURE: 97.8 F | HEART RATE: 81 BPM | SYSTOLIC BLOOD PRESSURE: 138 MMHG | RESPIRATION RATE: 20 BRPM | DIASTOLIC BLOOD PRESSURE: 79 MMHG | HEIGHT: 68 IN | WEIGHT: 157.63 LBS | OXYGEN SATURATION: 97 % | BODY MASS INDEX: 23.89 KG/M2

## 2021-04-22 DIAGNOSIS — R19.7 DIARRHEA, UNSPECIFIED TYPE: ICD-10-CM

## 2021-04-22 DIAGNOSIS — R10.9 ABDOMINAL PAIN, UNSPECIFIED ABDOMINAL LOCATION: ICD-10-CM

## 2021-04-22 LAB
ALBUMIN SERPL BCP-MCNC: 4.2 G/DL (ref 3.2–4.9)
ALBUMIN/GLOB SERPL: 1.2 G/DL
ALP SERPL-CCNC: 125 U/L (ref 30–99)
ALT SERPL-CCNC: 31 U/L (ref 2–50)
ANION GAP SERPL CALC-SCNC: 11 MMOL/L (ref 7–16)
AST SERPL-CCNC: 22 U/L (ref 12–45)
BACTERIA BLD CULT: NORMAL
BACTERIA BLD CULT: NORMAL
BASOPHILS # BLD AUTO: 0.5 % (ref 0–1.8)
BASOPHILS # BLD: 0.06 K/UL (ref 0–0.12)
BILIRUB SERPL-MCNC: 0.6 MG/DL (ref 0.1–1.5)
BUN SERPL-MCNC: 9 MG/DL (ref 8–22)
CALCIUM SERPL-MCNC: 9.2 MG/DL (ref 8.5–10.5)
CHLORIDE SERPL-SCNC: 108 MMOL/L (ref 96–112)
CO2 SERPL-SCNC: 21 MMOL/L (ref 20–33)
CREAT SERPL-MCNC: 0.75 MG/DL (ref 0.5–1.4)
EOSINOPHIL # BLD AUTO: 0.42 K/UL (ref 0–0.51)
EOSINOPHIL NFR BLD: 3.7 % (ref 0–6.9)
ERYTHROCYTE [DISTWIDTH] IN BLOOD BY AUTOMATED COUNT: 47.3 FL (ref 35.9–50)
GLOBULIN SER CALC-MCNC: 3.6 G/DL (ref 1.9–3.5)
GLUCOSE SERPL-MCNC: 88 MG/DL (ref 65–99)
HCG SERPL QL: NEGATIVE
HCT VFR BLD AUTO: 41.4 % (ref 37–47)
HGB BLD-MCNC: 13.3 G/DL (ref 12–16)
IMM GRANULOCYTES # BLD AUTO: 0.03 K/UL (ref 0–0.11)
IMM GRANULOCYTES NFR BLD AUTO: 0.3 % (ref 0–0.9)
LIPASE SERPL-CCNC: 26 U/L (ref 11–82)
LYMPHOCYTES # BLD AUTO: 3.79 K/UL (ref 1–4.8)
LYMPHOCYTES NFR BLD: 33.5 % (ref 22–41)
MCH RBC QN AUTO: 28.3 PG (ref 27–33)
MCHC RBC AUTO-ENTMCNC: 32.1 G/DL (ref 33.6–35)
MCV RBC AUTO: 88.1 FL (ref 81.4–97.8)
MONOCYTES # BLD AUTO: 0.48 K/UL (ref 0–0.85)
MONOCYTES NFR BLD AUTO: 4.2 % (ref 0–13.4)
NEUTROPHILS # BLD AUTO: 6.52 K/UL (ref 2–7.15)
NEUTROPHILS NFR BLD: 57.8 % (ref 44–72)
NRBC # BLD AUTO: 0 K/UL
NRBC BLD-RTO: 0 /100 WBC
PLATELET # BLD AUTO: 306 K/UL (ref 164–446)
PMV BLD AUTO: 9.1 FL (ref 9–12.9)
POTASSIUM SERPL-SCNC: 3.8 MMOL/L (ref 3.6–5.5)
PROT SERPL-MCNC: 7.8 G/DL (ref 6–8.2)
RBC # BLD AUTO: 4.7 M/UL (ref 4.2–5.4)
SIGNIFICANT IND 70042: NORMAL
SIGNIFICANT IND 70042: NORMAL
SITE SITE: NORMAL
SITE SITE: NORMAL
SODIUM SERPL-SCNC: 140 MMOL/L (ref 135–145)
SOURCE SOURCE: NORMAL
SOURCE SOURCE: NORMAL
WBC # BLD AUTO: 11.3 K/UL (ref 4.8–10.8)

## 2021-04-22 PROCEDURE — A9270 NON-COVERED ITEM OR SERVICE: HCPCS | Performed by: EMERGENCY MEDICINE

## 2021-04-22 PROCEDURE — 84703 CHORIONIC GONADOTROPIN ASSAY: CPT

## 2021-04-22 PROCEDURE — 700117 HCHG RX CONTRAST REV CODE 255: Performed by: EMERGENCY MEDICINE

## 2021-04-22 PROCEDURE — 96375 TX/PRO/DX INJ NEW DRUG ADDON: CPT

## 2021-04-22 PROCEDURE — 700111 HCHG RX REV CODE 636 W/ 250 OVERRIDE (IP): Performed by: EMERGENCY MEDICINE

## 2021-04-22 PROCEDURE — 80053 COMPREHEN METABOLIC PANEL: CPT

## 2021-04-22 PROCEDURE — 83690 ASSAY OF LIPASE: CPT

## 2021-04-22 PROCEDURE — 74177 CT ABD & PELVIS W/CONTRAST: CPT

## 2021-04-22 PROCEDURE — 36415 COLL VENOUS BLD VENIPUNCTURE: CPT

## 2021-04-22 PROCEDURE — 700102 HCHG RX REV CODE 250 W/ 637 OVERRIDE(OP): Performed by: EMERGENCY MEDICINE

## 2021-04-22 PROCEDURE — 85025 COMPLETE CBC W/AUTO DIFF WBC: CPT

## 2021-04-22 PROCEDURE — 99284 EMERGENCY DEPT VISIT MOD MDM: CPT

## 2021-04-22 PROCEDURE — 96374 THER/PROPH/DIAG INJ IV PUSH: CPT

## 2021-04-22 RX ORDER — HYDROCODONE BITARTRATE AND ACETAMINOPHEN 5; 325 MG/1; MG/1
1 TABLET ORAL ONCE
Status: COMPLETED | OUTPATIENT
Start: 2021-04-22 | End: 2021-04-22

## 2021-04-22 RX ORDER — MORPHINE SULFATE 4 MG/ML
4 INJECTION, SOLUTION INTRAMUSCULAR; INTRAVENOUS ONCE
Status: COMPLETED | OUTPATIENT
Start: 2021-04-22 | End: 2021-04-22

## 2021-04-22 RX ORDER — ONDANSETRON 2 MG/ML
4 INJECTION INTRAMUSCULAR; INTRAVENOUS ONCE
Status: COMPLETED | OUTPATIENT
Start: 2021-04-22 | End: 2021-04-22

## 2021-04-22 RX ADMIN — IOHEXOL 100 ML: 350 INJECTION, SOLUTION INTRAVENOUS at 17:45

## 2021-04-22 RX ADMIN — HYDROCODONE BITARTRATE AND ACETAMINOPHEN 1 TABLET: 5; 325 TABLET ORAL at 18:56

## 2021-04-22 RX ADMIN — MORPHINE SULFATE 4 MG: 4 INJECTION INTRAVENOUS at 16:22

## 2021-04-22 RX ADMIN — ONDANSETRON 4 MG: 2 INJECTION INTRAMUSCULAR; INTRAVENOUS at 16:22

## 2021-04-22 ASSESSMENT — FIBROSIS 4 INDEX: FIB4 SCORE: 0.61

## 2021-04-22 NOTE — ED PROVIDER NOTES
"ED Provider Note    CHIEF COMPLAINT  Chief Complaint   Patient presents with   • Diarrhea     Pt reports diarrhea for the past 4-5 days. hx of C-diff.    • Ear Pain     right side ear pain/rash to jaw line. hx of same.    • Fatigue       HPI  Charley Dunn is a 45 y.o. female with history of RA and C. difficile who presents complaining of fulminant diarrhea since yesterday.  She believes she has had 25-30 episodes and describes it as malodorous, nonbloody.  She reports temperature of 100.9.  She also admits to mainly right-sided abdominal pain that is achy, \"feels inflamed\", and bloating.  She states her symptoms are similar to prior C. difficile episodes.  She received IV antibiotics 2 weeks ago for a right ear cellulitis.    Patient denies vomiting, sick contacts, chest pain, shortness of breath.      ALLERGIES  Allergies   Allergen Reactions   • Bee Venom Anaphylaxis   • Blackberry [Rubus Fruticosus] Anaphylaxis     Oral swelling   • Fish Anaphylaxis and Swelling   • Levaquin Vomiting and Swelling     Tongue/mouth swelling  Tolerated cipro 4/2021   • Pcn [Penicillins] Anaphylaxis     Tolerates Keflex 10/2020   • Toradol Hives     + Migraine     • Tramadol Hives     + Migraine     • Tree Nuts Food Allergy Anaphylaxis   • Bactrim [Sulfamethoxazole W-Trimethoprim] Itching     Face itching     • Stadol [Butorphanol] Unspecified     Pt reports it makes her feel like she can't wake up       CURRENT MEDICATIONS  Denies    PAST MEDICAL HISTORY   has a past medical history of Anxiety, Bradycardia, Cellulitis, Crohn's disease (HCC), Ear infection, MRSA (methicillin resistant Staphylococcus aureus), RA (rheumatoid arthritis) ([facility]), and Smoker.    SURGICAL HISTORY   has a past surgical history that includes other abdominal surgery and other.    SOCIAL HISTORY  Social History     Tobacco Use   • Smoking status: Current Every Day Smoker     Packs/day: 0.50     Years: 32.00     Pack years: 16.00     Types: Cigarettes   • " "Smokeless tobacco: Never Used   Substance and Sexual Activity   • Alcohol use: Not Currently   • Drug use: Never   • Sexual activity: Not on file     Patient is originally from [place]    REVIEW OF SYSTEMS  See HPI for further details.  All other systems are negative except as above in HPI.      PHYSICAL EXAM  VITAL SIGNS: /75   Pulse 83   Temp 36.6 °C (97.8 °F) (Temporal)   Resp 20   Ht 1.727 m (5' 8\")   Wt 71.5 kg (157 lb 10.1 oz)   LMP  (LMP Unknown)   SpO2 94%   BMI 23.97 kg/m²     General:  WDWN female, nontoxic appearing in NAD; A+Ox3; V/S as above; tachycardic, afebrile  Skin: warm and dry; slightly pale color; no rash  HEENT: NCAT; EOMs intact; PERRL; no scleral icterus; patchy, flaky, erythematous cluster of lesions in the right preauricular area; there is fissured, flaky skin in the postauricular region; right ear canal is slightly edematous with flaky skin visible externally  Neck: FROM; soft; lymphadenopathy in the anterior cervical chain on the right that is tender    Cardiovascular: Regular heart rate and rhythm.  No murmurs, rubs, or gallops; pulses 2+ bilaterally radially and DP areas  Lungs: Clear to auscultation with good air movement bilaterally.  No wheezes, rhonchi, or rales.   Abdomen: BS present; soft; minimal to moderate tenderness over the right mid abdominal region; no rebound, guarding, or rigidity.  No organomegaly or pulsatile mass  Extremities: HULL x 4; no e/o trauma; no pedal edema  Neurologic: CNs III-XII grossly intact; speech clear; distal sensation intact; strength 5/5 UE/LEs  Psychiatric: Appropriate affect, normal mood    LABS  Results for orders placed or performed during the hospital encounter of 04/22/21   CBC WITH DIFFERENTIAL   Result Value Ref Range    WBC 11.3 (H) 4.8 - 10.8 K/uL    RBC 4.70 4.20 - 5.40 M/uL    Hemoglobin 13.3 12.0 - 16.0 g/dL    Hematocrit 41.4 37.0 - 47.0 %    MCV 88.1 81.4 - 97.8 fL    MCH 28.3 27.0 - 33.0 pg    MCHC 32.1 (L) 33.6 - 35.0 " g/dL    RDW 47.3 35.9 - 50.0 fL    Platelet Count 306 164 - 446 K/uL    MPV 9.1 9.0 - 12.9 fL    Neutrophils-Polys 57.80 44.00 - 72.00 %    Lymphocytes 33.50 22.00 - 41.00 %    Monocytes 4.20 0.00 - 13.40 %    Eosinophils 3.70 0.00 - 6.90 %    Basophils 0.50 0.00 - 1.80 %    Immature Granulocytes 0.30 0.00 - 0.90 %    Nucleated RBC 0.00 /100 WBC    Neutrophils (Absolute) 6.52 2.00 - 7.15 K/uL    Lymphs (Absolute) 3.79 1.00 - 4.80 K/uL    Monos (Absolute) 0.48 0.00 - 0.85 K/uL    Eos (Absolute) 0.42 0.00 - 0.51 K/uL    Baso (Absolute) 0.06 0.00 - 0.12 K/uL    Immature Granulocytes (abs) 0.03 0.00 - 0.11 K/uL    NRBC (Absolute) 0.00 K/uL   COMP METABOLIC PANEL   Result Value Ref Range    Sodium 140 135 - 145 mmol/L    Potassium 3.8 3.6 - 5.5 mmol/L    Chloride 108 96 - 112 mmol/L    Co2 21 20 - 33 mmol/L    Anion Gap 11.0 7.0 - 16.0    Glucose 88 65 - 99 mg/dL    Bun 9 8 - 22 mg/dL    Creatinine 0.75 0.50 - 1.40 mg/dL    Calcium 9.2 8.5 - 10.5 mg/dL    AST(SGOT) 22 12 - 45 U/L    ALT(SGPT) 31 2 - 50 U/L    Alkaline Phosphatase 125 (H) 30 - 99 U/L    Total Bilirubin 0.6 0.1 - 1.5 mg/dL    Albumin 4.2 3.2 - 4.9 g/dL    Total Protein 7.8 6.0 - 8.2 g/dL    Globulin 3.6 (H) 1.9 - 3.5 g/dL    A-G Ratio 1.2 g/dL   LIPASE   Result Value Ref Range    Lipase 26 11 - 82 U/L   HCG QUAL SERUM   Result Value Ref Range    Beta-Hcg Qualitative Serum Negative Negative   ESTIMATED GFR   Result Value Ref Range    GFR If African American >60 >60 mL/min/1.73 m 2    GFR If Non African American >60 >60 mL/min/1.73 m 2         IMAGING  CT-ABDOMEN-PELVIS WITH   Final Result      1.  No focal mesenteric inflammatory process.   2.  Prior appendectomy.   3.  Biliary dilation, likely postoperative.          MEDICAL RECORD  I have reviewed patient's medical record and pertinent results are listed below.      COURSE & MEDICAL DECISION MAKING  I have reviewed any medical record information, laboratory studies and radiographic results as  noted.    Charley Dunn is a 45 y.o. female with RA and recent diagnosis of C. difficile who presents complaining of abdominal pain and recurrent diarrhea.    Appropriate PPE was worn at all times while interacting with the patient, including goggles, N95 mask, and surgical mask.    NS bolus was ordered for possible dehydration related to patient's sxs of diarrhea    Pt's HR normalized after fluid bolus    5:33 PM  CT abdomen/pelvis demonstrates no evidence of toxic megacolon or colitis.  Labs demonstrate a mild leukocytosis 11.3 without bandemia or elevation of immature granulocytes.  Alk phos is slightly elevated to 125.  Lipase is normal.     Pt was re-evaluated after successful p.o. challenge.  We discussed imaging and lab results.  Patient requested more pain medication.  I ordered a Norco.  We discussed abdominal pain return precautions.  I contacted the ER  to establish a PCP so the patient may have outpatient test for C. difficile that she did not have any stool to provide us with a sample here.    FINAL IMPRESSION  1. Abdominal pain, unspecified abdominal location     2. Diarrhea, unspecified type       Electronically signed by: Anh Austin M.D., 4/22/2021 4:05 PM

## 2021-04-22 NOTE — ED TRIAGE NOTES
Chief Complaint   Patient presents with   • Diarrhea     Pt reports diarrhea for the past 4-5 days. hx of C-diff.    • Ear Pain     right side ear pain/rash to jaw line. hx of same.    • Fatigue     Explained to pt triage process, made pt aware to tell this RN/staff of any changes/concerns, pt verbalized understanding of process and instructions given. Pt to ER lobby.  Pt/staff masked and in appropriate PPE during encounter.

## 2021-04-23 NOTE — ED NOTES
Took pt some water for PO challenge @ this time. Pt states meds did not really do a lot to help with her pain

## 2021-04-23 NOTE — PROGRESS NOTES
Patient applied for insurance and has a letter saying she has been approved but then has been notified she is still pending. Applied about a month ago at [facility] with a PFA. CHW will call medicaid office for patient to receive a correct answer. Patient requests to wait to be scheduled until she finds out about her insurance.

## 2021-04-23 NOTE — PROGRESS NOTES
CHW called Medicaid (on hold for 30minutes) to confirm patient's medicaid status.     Patient was approved for the month of January only. After that the patient has been denied for Medicaid due to income. CHW will look into other insurance options for patient and call patient with this information.     Other insurance options can be Access to healthcare or enrollment to monthly insurance.     CHW called patient about her Medicaid status but there was no answer. CHW left a VM for patient. CHW will follow up with patient to share other resources for her.

## 2021-04-23 NOTE — ED NOTES
Pt ambulate to [id number]. Pt is alert et oriented x4 GCS= 15. Pt c/o diffuse abd pain and nausea states she has had c-diff like stools last couple of days and was a recent admission with prolonged abx therapy. Pt states has had a hx of C- Diff and this feels the same. Will cont to monitor pt

## 2021-04-23 NOTE — DISCHARGE INSTRUCTIONS
THE EXACT CAUSE OF YOUR PAIN IS UNCLEAR--THIS MIGHT BE EARLY IN THE DISEASE PROCESS AND WE ARE UNABLE TO IDENTIFY IT AT THIS TIME (SUCH AS EARLY APPENDICITIS, FOR EXAMPLE).      RETURN TO ER IN 8-12 HRS UNLESS YOU ARE FEELING COMPLETELY BETTER.    RETURN SOONER FOR PAIN, VOMITING NOT CONTROLLED BY MEDICATIONS, FEVER, ANY OTHER CONCERN.    CLEAR LIQUIDS FOR NEXT 12 HOURS.  ADVANCE DIET AS TOLERATED.    Please provide stool sample to the outpatient lab when you are able to

## 2021-04-23 NOTE — DISCHARGE PLANNING
Received call from [facility] to schedule patient with PCP. Patient would like to wait until her insurance is approved to schedule appointment. CHW will FV with patient once this worker has confirmed her insurance and will try to schedule her.

## 2021-04-27 NOTE — PROGRESS NOTES
CHW called patient a second time to inform them of their insurance status and insurance resources. Patient did not answer CHW's call. CHW will call patient a third time to share this information.

## 2021-04-30 NOTE — PROGRESS NOTES
CHW called patient for a third time to inform patient of the information. Patient did not answer CHW's phone call. CHW left a VM for patient. CHW will discharge patient from list due to inability to contact.

## 2022-06-16 NOTE — PROGRESS NOTES
2 RN SKIN CHECK    2 RN skin check completed with second RN.   Devices in place - IV catheter.  Skin assessed under devices - Intact.  Confirmed pressure ulcers found on - None.  New potential pressure ulcers noted on - None.  Wound consult placed - N/A.    Redness and swelling on patient's right side of her face present, otherwise, intact.    The following interventions in place - reminded patient to turn at least every 2 hours.     Called the pt advised to schedule she stated she will look at her schedule and CB

## 2023-05-23 NOTE — DISCHARGE INSTRUCTIONS
If you are not improving in 2 days go to [facility] ER for re-evaluation. Return for increasing pain, fever, abdominal pain moving to the right lower quadrant or increasing facial swelling.   
No